# Patient Record
Sex: MALE | Race: WHITE | NOT HISPANIC OR LATINO | Employment: FULL TIME | URBAN - METROPOLITAN AREA
[De-identification: names, ages, dates, MRNs, and addresses within clinical notes are randomized per-mention and may not be internally consistent; named-entity substitution may affect disease eponyms.]

---

## 2017-01-04 ENCOUNTER — ANESTHESIA (OUTPATIENT)
Dept: SURGERY | Facility: HOSPITAL | Age: 37
DRG: 330 | End: 2017-01-04
Payer: COMMERCIAL

## 2017-01-04 PROCEDURE — 25000003 PHARM REV CODE 250: Performed by: STUDENT IN AN ORGANIZED HEALTH CARE EDUCATION/TRAINING PROGRAM

## 2017-01-04 PROCEDURE — 63600175 PHARM REV CODE 636 W HCPCS: Performed by: STUDENT IN AN ORGANIZED HEALTH CARE EDUCATION/TRAINING PROGRAM

## 2017-01-04 PROCEDURE — D9220A PRA ANESTHESIA: Mod: ,,, | Performed by: ANESTHESIOLOGY

## 2017-01-04 PROCEDURE — 25000003 PHARM REV CODE 250: Performed by: NURSE PRACTITIONER

## 2017-01-04 PROCEDURE — 63600175 PHARM REV CODE 636 W HCPCS: Performed by: NURSE PRACTITIONER

## 2017-01-04 RX ORDER — ONDANSETRON 2 MG/ML
INJECTION INTRAMUSCULAR; INTRAVENOUS
Status: DISCONTINUED | OUTPATIENT
Start: 2017-01-04 | End: 2017-01-04

## 2017-01-04 RX ORDER — GLYCOPYRROLATE 0.2 MG/ML
INJECTION INTRAMUSCULAR; INTRAVENOUS
Status: DISCONTINUED | OUTPATIENT
Start: 2017-01-04 | End: 2017-01-04

## 2017-01-04 RX ORDER — FENTANYL CITRATE 50 UG/ML
INJECTION, SOLUTION INTRAMUSCULAR; INTRAVENOUS
Status: DISCONTINUED | OUTPATIENT
Start: 2017-01-04 | End: 2017-01-04

## 2017-01-04 RX ORDER — LIDOCAINE HCL/PF 100 MG/5ML
SYRINGE (ML) INTRAVENOUS
Status: DISCONTINUED | OUTPATIENT
Start: 2017-01-04 | End: 2017-01-04

## 2017-01-04 RX ORDER — MIDAZOLAM HYDROCHLORIDE 1 MG/ML
INJECTION, SOLUTION INTRAMUSCULAR; INTRAVENOUS
Status: DISCONTINUED | OUTPATIENT
Start: 2017-01-04 | End: 2017-01-04

## 2017-01-04 RX ORDER — NEOSTIGMINE METHYLSULFATE 1 MG/ML
INJECTION, SOLUTION INTRAVENOUS
Status: DISCONTINUED | OUTPATIENT
Start: 2017-01-04 | End: 2017-01-04

## 2017-01-04 RX ORDER — KETAMINE HYDROCHLORIDE 100 MG/ML
INJECTION, SOLUTION INTRAMUSCULAR; INTRAVENOUS
Status: DISCONTINUED | OUTPATIENT
Start: 2017-01-04 | End: 2017-01-04

## 2017-01-04 RX ORDER — PROPOFOL 10 MG/ML
VIAL (ML) INTRAVENOUS
Status: DISCONTINUED | OUTPATIENT
Start: 2017-01-04 | End: 2017-01-04

## 2017-01-04 RX ORDER — PHENYLEPHRINE HYDROCHLORIDE 10 MG/ML
INJECTION INTRAVENOUS
Status: DISCONTINUED | OUTPATIENT
Start: 2017-01-04 | End: 2017-01-04

## 2017-01-04 RX ORDER — ACETAMINOPHEN 10 MG/ML
INJECTION, SOLUTION INTRAVENOUS
Status: DISCONTINUED | OUTPATIENT
Start: 2017-01-04 | End: 2017-01-04

## 2017-01-04 RX ORDER — ROCURONIUM BROMIDE 10 MG/ML
INJECTION, SOLUTION INTRAVENOUS
Status: DISCONTINUED | OUTPATIENT
Start: 2017-01-04 | End: 2017-01-04

## 2017-01-04 RX ADMIN — DEXMEDETOMIDINE HYDROCHLORIDE 0.5 MCG/KG/HR: 100 INJECTION, SOLUTION, CONCENTRATE INTRAVENOUS at 10:01

## 2017-01-04 RX ADMIN — PHENYLEPHRINE HYDROCHLORIDE 100 MCG: 10 INJECTION INTRAVENOUS at 12:01

## 2017-01-04 RX ADMIN — KETAMINE HYDROCHLORIDE 40 MG: 100 INJECTION, SOLUTION, CONCENTRATE INTRAMUSCULAR; INTRAVENOUS at 10:01

## 2017-01-04 RX ADMIN — MIDAZOLAM HYDROCHLORIDE 2 MG: 1 INJECTION, SOLUTION INTRAMUSCULAR; INTRAVENOUS at 09:01

## 2017-01-04 RX ADMIN — PHENYLEPHRINE HYDROCHLORIDE 100 MCG: 10 INJECTION INTRAVENOUS at 10:01

## 2017-01-04 RX ADMIN — PHENYLEPHRINE HYDROCHLORIDE 50 MCG: 10 INJECTION INTRAVENOUS at 12:01

## 2017-01-04 RX ADMIN — PHENYLEPHRINE HYDROCHLORIDE 50 MCG: 10 INJECTION INTRAVENOUS at 11:01

## 2017-01-04 RX ADMIN — NEOSTIGMINE METHYLSULFATE 4 MG: 1 INJECTION INTRAVENOUS at 12:01

## 2017-01-04 RX ADMIN — ONDANSETRON 4 MG: 2 INJECTION INTRAMUSCULAR; INTRAVENOUS at 12:01

## 2017-01-04 RX ADMIN — METRONIDAZOLE 500 MG: 500 SOLUTION INTRAVENOUS at 10:01

## 2017-01-04 RX ADMIN — IBUPROFEN 800 MG: 800 INJECTION INTRAVENOUS at 10:01

## 2017-01-04 RX ADMIN — ROCURONIUM BROMIDE 15 MG: 10 INJECTION, SOLUTION INTRAVENOUS at 10:01

## 2017-01-04 RX ADMIN — CEFTRIAXONE 2000 MG: 2 INJECTION, SOLUTION INTRAVENOUS at 10:01

## 2017-01-04 RX ADMIN — SODIUM CHLORIDE, SODIUM ACETATE ANHYDROUS, SODIUM GLUCONATE, POTASSIUM CHLORIDE, AND MAGNESIUM CHLORIDE: 526; 222; 502; 37; 30 INJECTION, SOLUTION INTRAVENOUS at 11:01

## 2017-01-04 RX ADMIN — SODIUM CHLORIDE: 0.9 INJECTION, SOLUTION INTRAVENOUS at 09:01

## 2017-01-04 RX ADMIN — FENTANYL CITRATE 50 MCG: 50 INJECTION, SOLUTION INTRAMUSCULAR; INTRAVENOUS at 12:01

## 2017-01-04 RX ADMIN — PROPOFOL 180 MG: 10 INJECTION, EMULSION INTRAVENOUS at 10:01

## 2017-01-04 RX ADMIN — PHENYLEPHRINE HYDROCHLORIDE 100 MCG: 10 INJECTION INTRAVENOUS at 11:01

## 2017-01-04 RX ADMIN — SODIUM CHLORIDE, SODIUM ACETATE ANHYDROUS, SODIUM GLUCONATE, POTASSIUM CHLORIDE, AND MAGNESIUM CHLORIDE: 526; 222; 502; 37; 30 INJECTION, SOLUTION INTRAVENOUS at 09:01

## 2017-01-04 RX ADMIN — ROCURONIUM BROMIDE 10 MG: 10 INJECTION, SOLUTION INTRAVENOUS at 11:01

## 2017-01-04 RX ADMIN — ACETAMINOPHEN 1000 MG: 10 INJECTION, SOLUTION INTRAVENOUS at 10:01

## 2017-01-04 RX ADMIN — FENTANYL CITRATE 50 MCG: 50 INJECTION, SOLUTION INTRAMUSCULAR; INTRAVENOUS at 10:01

## 2017-01-04 RX ADMIN — KETAMINE HYDROCHLORIDE 10 MG: 100 INJECTION, SOLUTION, CONCENTRATE INTRAMUSCULAR; INTRAVENOUS at 11:01

## 2017-01-04 RX ADMIN — FENTANYL CITRATE 50 MCG: 50 INJECTION, SOLUTION INTRAMUSCULAR; INTRAVENOUS at 11:01

## 2017-01-04 RX ADMIN — GLYCOPYRROLATE 0.6 MG: 0.2 INJECTION, SOLUTION INTRAMUSCULAR; INTRAVENOUS at 12:01

## 2017-01-04 RX ADMIN — HYDROCORTISONE SODIUM SUCCINATE 100 MG: 100 INJECTION, POWDER, FOR SOLUTION INTRAMUSCULAR; INTRAVENOUS at 10:01

## 2017-01-04 RX ADMIN — ROCURONIUM BROMIDE 35 MG: 10 INJECTION, SOLUTION INTRAVENOUS at 10:01

## 2017-01-04 RX ADMIN — LIDOCAINE HYDROCHLORIDE 100 MG: 20 INJECTION, SOLUTION INTRAVENOUS at 10:01

## 2017-01-10 ENCOUNTER — TELEPHONE (OUTPATIENT)
Dept: SURGERY | Facility: CLINIC | Age: 37
End: 2017-01-10

## 2017-01-10 NOTE — TELEPHONE ENCOUNTER
----- Message from Mack Solis sent at 1/10/2017  4:21 PM CST -----  Contact: Pt wife:[phone]  Pt wife called and states he would like to speak with  's nurse in regards to the pt. Pt wife states the pt is experiencing a lot of pain after the surgery and some nausea. Pt wife also states she has some question in regards to the pt traveling.

## 2017-01-11 ENCOUNTER — PATIENT OUTREACH (OUTPATIENT)
Dept: ADMINISTRATIVE | Facility: CLINIC | Age: 37
End: 2017-01-11
Payer: COMMERCIAL

## 2017-01-11 NOTE — PATIENT INSTRUCTIONS
Discharge Instructions: Caring for Your Incision  You are going home with stitches (sutures), surgical staples, special strips of surgical tape called Steri-Strips, or surgical skin glue. One of these items was used to close your incision, help stop bleeding, and speed healing. Follow the tips on this sheet to help your incision heal.  Home care  · Always wash your hands before touching your incision.  · Keep your incision clean and dry.  · Avoid doing things that could cause dirt or sweat to get on your incision.  · Dont pick at scabs. They help protect the wound.  · Keep your incision out of water.  · Take a sponge bath to avoid getting your incision wet, unless your healthcare provider tells you otherwise.  · Ask your provider when can you take a shower or bathe.  · Ask your provider about the best way to keep your incision dry when bathing or showering.  · Pat sutures dry if they get wet. Dont rub.  · Leave the dressing (bandage) in place until you are told to remove it or change it. Change it only as directed, using clean hands.  · After the first 12 hours, change your dressing every 24 hours, or as directed by your healthcare provider.  · Change your dressing if it gets wet or soiled.  Care for specific closures  Follow these guidelines unless your child's healthcare provider tells you otherwise:  · Sutures or staples. Once you no longer need to keep these dry, clean the incision or wound daily. First remove the bandage using clean hands. Then wash the area gently with soap and warm water. Use a wet cotton swab to loosen and remove any blood or crust that forms. After cleaning, put a thin layer of antibiotic ointment on. Then put on a new bandage.  · Skin glue. Dont put liquid, ointment, or cream on your incision or wound while the glue is in place. Avoid activities that cause heavy sweating. Protect the incision or wound from sunlight. Do not scratch, rub, or pick at the glue. Do not put tape directly  over the glue. The glue should peel off within 5 to 10 days.  · Surgical tape. Keep your incision or wound dry. If it gets wet, blot the area dry with a clean towel. Surgical tape usually falls off within 7 to 10 days. If it has not fallen off after 10 days, contact your healthcare provider before taking it off yourself. If you are told to remove the tape, put mineral oil or petroleum jelly on a cotton ball. Gently rub the tape until it is removed.  Follow-up care  Follow up with your healthcare provider to ask how long sutures or staples should be left in place. Be sure to return for suture or staple removal as directed. If dissolving stitches were used in your mouth, these will not need to be removed. They should fall out or dissolve on their own.  If tape closures were used, remove them yourself when your provider recommends if they have not fallen off on their own. If skin glue was used, the glue will wear off by itself.      When to seek medical care  Call your healthcare provider right away if you have any of the following:  · More pain, redness, swelling, bleeding, or foul-smelling discharge around the incision area  · Fever of 100.4°F (38°C) or higher, or as directed by your healthcare provider  · Shaking chills  · Vomiting or nausea that doesnt go away  · Numbness, coldness, or tingling around the incision area, or changes in skin color  · Opening of the sutures or wound  · Stitches or staples come apart or fall out or surgical tape falls off before 7 days, or as directed by your provider   © 7644-6397 [employer]. [address] [place] [address], [place] [address]. All rights reserved. This information is not intended as a substitute for professional medical care. Always follow your healthcare professional's instructions.

## 2017-01-23 ENCOUNTER — OFFICE VISIT (OUTPATIENT)
Dept: SURGERY | Facility: CLINIC | Age: 37
End: 2017-01-23
Payer: COMMERCIAL

## 2017-01-23 VITALS
SYSTOLIC BLOOD PRESSURE: 105 MMHG | HEART RATE: 111 BPM | HEIGHT: 72 IN | WEIGHT: 154.31 LBS | BODY MASS INDEX: 20.9 KG/M2 | DIASTOLIC BLOOD PRESSURE: 78 MMHG

## 2017-01-23 DIAGNOSIS — K50.012 CROHN'S DISEASE OF SMALL INTESTINE WITH INTESTINAL OBSTRUCTION: Primary | ICD-10-CM

## 2017-01-23 PROCEDURE — 99024 POSTOP FOLLOW-UP VISIT: CPT | Mod: S$GLB,,, | Performed by: COLON & RECTAL SURGERY

## 2017-01-23 PROCEDURE — 99999 PR PBB SHADOW E&M-EST. PATIENT-LVL III: CPT | Mod: PBBFAC,,, | Performed by: COLON & RECTAL SURGERY

## 2017-01-23 NOTE — PROGRESS NOTES
History & Physical    SUBJECTIVE:     History of Present Illness:  Mr. Toro is a 36 y.o. male with a history of Crohn's disease who is here for a postop follow up. He is status post laparoscopic ileocecectomy on 1/4/17. Today he is doing very well. His postoperative course has been uncomplicated. His pain has continued to improve, but he is currently still requiring about 4 pain pills per day. He says that his bowel movements have remained soft and regular on a low residue diet. He denied fever, chills, nausea, vomiting, constipation, shortness of breath, chest pain, and incisional redness, swelling, or drainage.      Chief Complaint   Patient presents with    Crohn's Disease       Review of patient's allergies indicates:   Allergen Reactions    Remicade [infliximab]        Current Outpatient Prescriptions   Medication Sig Dispense Refill    oxycodone-acetaminophen (PERCOCET) 5-325 mg per tablet Take 1-2 tablets by mouth every 4 (four) hours as needed. 91 tablet 0    adalimumab (HUMIRA PEN) 40 mg/0.8 mL PnKt Inject 0.8 mLs (40 mg total) into the skin every 14 (fourteen) days. Every 2 weeks 6 each 4    fluocinonide 0.05% (LIDEX) 0.05 % cream Apply sparingly bid to affected areas 30 g 0    silver sulfADIAZINE 1% (SILVADENE) 1 % cream Apply topically 2 (two) times daily. Apply sparingly bid to affected areas 25 g 1     No current facility-administered medications for this visit.        Past Medical History   Diagnosis Date    Allergy      seasonal    Chronic diarrhea     Crohn disease     DDD (degenerative disc disease), cervical     Joint pain     Keloid cicatrix     Ulcer     Ulcerative colitis      Past Surgical History   Procedure Laterality Date    Abcess drainage      Colonoscopy N/A 12/5/2016     Procedure: COLONOSCOPY;  Surgeon: Steven Kerr MD;  Location: [address]);  Service: Endoscopy;  Laterality: N/A;     Family History   Problem Relation Age of Onset    Arthritis Father      Cancer Maternal Grandmother     Melanoma Neg Hx     Psoriasis Neg Hx     Lupus Neg Hx     Eczema Neg Hx     Acne Neg Hx     Colon cancer Neg Hx     Rectal cancer Neg Hx     Stomach cancer Neg Hx     Crohn's disease Neg Hx     Esophageal cancer Neg Hx     Irritable bowel syndrome Neg Hx     Ulcerative colitis Neg Hx     Celiac disease Neg Hx      Social History   Substance Use Topics    Smoking status: Never Smoker    Smokeless tobacco: Never Used    Alcohol use No        Review of Systems:  Negative, except as outlined in the HPI.     OBJECTIVE:     Vital Signs (Most Recent)  Pulse: (!) 111 (01/23/17 1317)  BP: 105/78 (01/23/17 1317)  6' (1.829 m)  6' (1.829 m)     Physical Exam:  General: well developed, well nourished, no distress  Head: normocephalic, atraumatic  Eyes:  conjunctivae/corneas clear.  Neck: supple, symmetrical, trachea midline  Lungs:  normal respiratory effort  Heart: regular rate and rhythm  Abdomen: soft, non-tender non-distented; incisions are well-healed with no sign of acute infection.   Neurologic: Alert and oriented. Thought content appropriate      ASSESSMENT/PLAN:     Mr. Toro is a 37 yo M with Crohn's disease; now s/p laparoscopic ileocecectomy on 1/4, doing well.     - Resume Humira   - Needs to follow up with GI  - May go to a regular diet  - Resume normal daily activities  - May return to light duty work at 6 weeks, unrestricted at 8 weeks  - Return to clinic PRN     I have interviewed and examined the patient, reviewed the notes and assessments, and/or personally supervised the procedure(s) performed by Dr. Sauer, and I concur with her/his documentation of Escobar Toro.  See below addendum for my evaluation and additional findings.    37 yo M with ileocecal Crohn's & fibrostenotic terminal ileal stricture, s/p laparoscopic ileocecectomy 1/4/17.    Doing well post-op, still with some incisional pain.  Eating with no N/V, BM's soft, no F/C.    Exam as  noted above.    Pathology:  Ileocecal colon, segmental resection:  Ileum and cecum show a grossly identified area of stricture mucosa showing scattered lymphoid aggregates.  No evidence of dysplasia or malignancy identified.  Ileocecal valve shows submucosal mature adipose tissue forming a polypoid lesion consistent with fatty replacement at the ileocecal valve. No evidence of dysplasia or malignancy is identified.  Proximal ileal margin is viable and free of dysplasia or malignancy.  Distal colonic margin is viable and free of dysplasia or malignancy.  Total length of small bowel resected - 10 cm    IMPRESSION:  Crohn's disease, s/p lap ileocecectomy    RECOMMENDATIONS:  Diet as tolerated - increase fiber intake  OK to RTW light duty 6 wks post-op, full duty 8 weeks  F/U with GI to resume anti-TNF therapy  RTO prn

## 2017-01-26 ENCOUNTER — TELEPHONE (OUTPATIENT)
Dept: GASTROENTEROLOGY | Facility: CLINIC | Age: 37
End: 2017-01-26

## 2017-01-26 NOTE — TELEPHONE ENCOUNTER
----- Message from Steven Kerr MD sent at 1/25/2017  3:41 PM CST -----  Follow up in GI clinic in a month.

## 2017-02-23 ENCOUNTER — TELEPHONE (OUTPATIENT)
Dept: GASTROENTEROLOGY | Facility: CLINIC | Age: 37
End: 2017-02-23

## 2017-02-23 ENCOUNTER — OFFICE VISIT (OUTPATIENT)
Dept: GASTROENTEROLOGY | Facility: CLINIC | Age: 37
End: 2017-02-23
Payer: COMMERCIAL

## 2017-02-23 VITALS
BODY MASS INDEX: 22.19 KG/M2 | HEIGHT: 72 IN | SYSTOLIC BLOOD PRESSURE: 124 MMHG | DIASTOLIC BLOOD PRESSURE: 86 MMHG | WEIGHT: 163.81 LBS | HEART RATE: 113 BPM

## 2017-02-23 DIAGNOSIS — K50.10 CROHN'S COLITIS, WITHOUT COMPLICATIONS: ICD-10-CM

## 2017-02-23 DIAGNOSIS — K50.00 CROHN'S DISEASE OF SMALL INTESTINE WITHOUT COMPLICATION: Primary | ICD-10-CM

## 2017-02-23 PROCEDURE — 99214 OFFICE O/P EST MOD 30 MIN: CPT | Mod: S$GLB,,, | Performed by: INTERNAL MEDICINE

## 2017-02-23 PROCEDURE — 1160F RVW MEDS BY RX/DR IN RCRD: CPT | Mod: S$GLB,,, | Performed by: INTERNAL MEDICINE

## 2017-02-23 PROCEDURE — 99999 PR PBB SHADOW E&M-EST. PATIENT-LVL III: CPT | Mod: PBBFAC,,, | Performed by: INTERNAL MEDICINE

## 2017-02-23 RX ORDER — POLYETHYLENE GLYCOL 3350, SODIUM SULFATE ANHYDROUS, SODIUM BICARBONATE, SODIUM CHLORIDE, POTASSIUM CHLORIDE 236; 22.74; 6.74; 5.86; 2.97 G/4L; G/4L; G/4L; G/4L; G/4L
POWDER, FOR SOLUTION ORAL
Refills: 0 | COMMUNITY
Start: 2016-12-03 | End: 2017-06-19

## 2017-02-23 NOTE — PROGRESS NOTES
REASON FOR VISIT:  Crohn disease of small bowel.    HISTORY OF PRESENT ILLNESS:  Mr. Toro was last seen in October in clinic   after recent hospitalization with fibrostenotic TI with possible mild   inflammation, which responded to prednisone.  He was previously on Humira;   however, given the continued stricture and his clinical symptoms, he was   recommended to undergo ileocolectomy and on 01/04/2017 underwent ileocecal   resection of 10 cm of strictured small bowel.  He gradually recovered and is   currently feeling significantly better, although he feels a little pain in his   joints of his hands, shoulder, back and hips.  Bowels are moving without much   issue, no blood.  His wounds are healing well.  Today, we talked about resuming   the Humira given the high chance of postoperative recurrence.  We will initiate   prior authorization with his insurance company if needed.  Given that he has   been off his Humira for at least two months now, we will initiate with the   loading dose and continue the maintenance.    PAST MEDICAL, SURGICAL, SOCIAL AND FAMILY HISTORY:  Reviewed.    MEDICATIONS AND ALLERGIES:  Reviewed.    REVIEW OF SYSTEMS:  CONSTITUTIONAL:  No fever, no chills.  Body weight is stable.  EYES:  No visual changes.  No red eyes.  ENT:  No odynophagia or hoarseness.  CARDIOVASCULAR:  No angina or palpitations.  RESPIRATORY:  No shortness of breath or wheezing.  GASTROINTESTINAL:  No blood in the stool.  MUSCULOSKELETAL:  Complains of diffuse arthralgias.    PHYSICAL EXAMINATION:  VITAL SIGNS:  See EPIC.  GENERAL:  Awake, alert and oriented x3, in no acute distress.  ABDOMEN:  Flat, soft, mild tenderness around the incisions, but otherwise bowel   sounds are active.  CARDIOVASCULAR:  S1, S2 normal.  RESPIRATORY:  Bilateral air entry equal.    IMPRESSION:  Crohn disease with history of fibrostenotic TI, status post   ileocecal resection, currently in clinical remission.    RECOMMENDATIONS:  1.  We  will reinitiate Humira with loading dose:  2.  Follow up in GI Clinic in six months or sooner with any issues.      ACT/HN  dd: 02/23/2017 13:29:58 (CST)  td: 02/24/2017 02:40:52 (CST)  Doc ID   #[id number]  Job ID #[id number]    CC:

## 2017-02-23 NOTE — MR AVS SNAPSHOT
[facility] - Gastroenter[facility]  [address]  [facility] [address]  Phone: [phone]  Fax: [phone]                  Escobar Toro   2017 1:00 PM   Office Visit    Description:  Male : 1980   Provider:  Steven Kerr MD   Department:  [facility]           Reason for Visit     Follow-up                To Do List           Goals (5 Years of Data)     None      [facility]     [facility] Nurse Care Line -  Assistance  Registered nurses in the [facility] provide clinical advisement, health education, appointment booking, and other advisory services.  Call for this free service at [phone].             Medications           Message regarding Medications     Verify the changes and/or additions to your medication regime listed below are the same as discussed with your clinician today.  If any of these changes or additions are incorrect, please notify your healthcare provider.             Verify that the below list of medications is an accurate representation of the medications you are currently taking.  If none reported, the list may be blank. If incorrect, please contact your healthcare provider. Carry this list with you in case of emergency.           Current Medications     adalimumab (HUMIRA PEN) 40 mg/0.8 mL PnKt Inject 0.8 mLs (40 mg total) into the skin every 14 (fourteen) days. Every 2 weeks    fluocinonide 0.05% (LIDEX) 0.05 % cream Apply sparingly bid to affected areas    oxycodone-acetaminophen (PERCOCET) 5-325 mg per tablet Take 1-2 tablets by mouth every 4 (four) hours as needed.    polyethylene glycol (GOLYTELY,NULYTELY) 236-22.74-6.74 -5.86 gram suspension TAKE 4,000 MLS (4 L TOTAL) BY MOUTH ONCE.    silver sulfADIAZINE 1% (SILVADENE) 1 % cream Apply topically 2 (two) times daily. Apply sparingly bid to affected areas           Clinical Reference Information           Your Vitals Were     BP Pulse Height Weight BMI     124/86 113 6' (1.829 m) 74.3 kg (163 lb 12.8 oz) 22.22 kg/m2      Blood Pressure          Most Recent Value    BP  124/86      Allergies as of 2/23/2017     Remicade [Infliximab]      Immunizations Administered on Date of Encounter - 2/23/2017     None      Language Assistance Services     ATTENTION: Language assistance services are available, free of charge. Please call [phone].      ATENCIÓN: Si habla español, tiene a wahl disposición servicios gratuitos de asistencia lingüística. Llame al [phone].     CHÚ Ý: N?u b?n nói Ti?ng Vi?t, có các d?ch v? h? tr? ngôn ng? mi?n phí dành cho b?n. G?i s? [phone].         [facility] complies with applicable Federal civil rights laws and does not discriminate on the basis of race, color, national origin, age, disability, or sex.

## 2017-02-23 NOTE — TELEPHONE ENCOUNTER
Ma completed the PA for patient medication and it was faxed to [facility] on 2/23 fax number [phone]

## 2017-04-03 ENCOUNTER — TELEPHONE (OUTPATIENT)
Dept: INTERNAL MEDICINE | Facility: CLINIC | Age: 37
End: 2017-04-03

## 2017-04-03 NOTE — TELEPHONE ENCOUNTER
----- Message from Anne-Marie Way sent at 4/3/2017 11:33 AM CDT -----  Contact: [employer]  Message     Appointment Request From: Escobar Toro        With Provider: Liana Selby MD [-Primary Care Physician-]        Would Accept With:Request appointment time not available        Preferred Date Range: Any date 4/1/2017 or later        Preferred Times: Any        Reason for visit: Request an Appt        Comments:    I went to an urgent care walk in clinic off of [address] for a cough that has lasted for about two weeks now. The doctor said I have asthma, I have never had asthma in my life. He prescribed me asthma inhalers. Unfortunately, I question his judgement and would like a more sound opinion from a reliable doctor and staff.

## 2017-04-03 NOTE — TELEPHONE ENCOUNTER
Wheezing can occur with any kind of bronchitis, and may completely resolve when it's over.   May schedule urgent care if he has lingering fever, chest tightness or shortness of breath, otherwise - call back if still coughing after a month.  If he requests treatment of some kind, I'll need to know what he already received.

## 2017-04-04 NOTE — TELEPHONE ENCOUNTER
Spoke with patient he states he ll try the inhaler that the urgent care doctor prescribe and will follow up with us if need be..  Thanks  Laureen

## 2017-05-24 ENCOUNTER — OFFICE VISIT (OUTPATIENT)
Dept: INTERNAL MEDICINE | Facility: CLINIC | Age: 37
End: 2017-05-24
Payer: COMMERCIAL

## 2017-05-24 ENCOUNTER — HOSPITAL ENCOUNTER (EMERGENCY)
Facility: HOSPITAL | Age: 37
Discharge: HOME | End: 2017-05-24
Attending: EMERGENCY MEDICINE
Payer: COMMERCIAL

## 2017-05-24 ENCOUNTER — HOSPITAL ENCOUNTER (OUTPATIENT)
Dept: RADIOLOGY | Facility: HOSPITAL | Age: 37
Discharge: HOME | End: 2017-05-24
Attending: INTERNAL MEDICINE
Payer: COMMERCIAL

## 2017-05-24 VITALS
TEMPERATURE: 100 F | OXYGEN SATURATION: 98 % | HEIGHT: 72 IN | BODY MASS INDEX: 21.13 KG/M2 | DIASTOLIC BLOOD PRESSURE: 87 MMHG | HEART RATE: 122 BPM | SYSTOLIC BLOOD PRESSURE: 133 MMHG | WEIGHT: 156 LBS | RESPIRATION RATE: 18 BRPM

## 2017-05-24 VITALS
BODY MASS INDEX: 21.41 KG/M2 | TEMPERATURE: 99 F | DIASTOLIC BLOOD PRESSURE: 90 MMHG | WEIGHT: 158.06 LBS | HEIGHT: 72 IN | OXYGEN SATURATION: 98 % | SYSTOLIC BLOOD PRESSURE: 128 MMHG | HEART RATE: 134 BPM

## 2017-05-24 DIAGNOSIS — J18.9 PNEUMONIA OF RIGHT UPPER LOBE DUE TO INFECTIOUS ORGANISM: ICD-10-CM

## 2017-05-24 DIAGNOSIS — J02.0 STREP PHARYNGITIS: ICD-10-CM

## 2017-05-24 DIAGNOSIS — J02.0 STREP THROAT: Primary | ICD-10-CM

## 2017-05-24 DIAGNOSIS — J18.9 PNEUMONIA OF RIGHT UPPER LOBE DUE TO INFECTIOUS ORGANISM: Primary | ICD-10-CM

## 2017-05-24 DIAGNOSIS — R68.89 FLU-LIKE SYMPTOMS: ICD-10-CM

## 2017-05-24 LAB
DEPRECATED S PYO AG THROAT QL EIA: POSITIVE
FLUAV AG SPEC QL IA: NEGATIVE
FLUBV AG SPEC QL IA: NEGATIVE
SPECIMEN SOURCE: NORMAL

## 2017-05-24 PROCEDURE — 99999 PR PBB SHADOW E&M-EST. PATIENT-LVL V: CPT | Mod: PBBFAC,,, | Performed by: PHYSICIAN ASSISTANT

## 2017-05-24 PROCEDURE — 87400 INFLUENZA A/B EACH AG IA: CPT | Mod: 59

## 2017-05-24 PROCEDURE — 63600175 PHARM REV CODE 636 W HCPCS: Performed by: STUDENT IN AN ORGANIZED HEALTH CARE EDUCATION/TRAINING PROGRAM

## 2017-05-24 PROCEDURE — 96372 THER/PROPH/DIAG INJ SC/IM: CPT

## 2017-05-24 PROCEDURE — 99283 EMERGENCY DEPT VISIT LOW MDM: CPT | Mod: ,,, | Performed by: EMERGENCY MEDICINE

## 2017-05-24 PROCEDURE — 87880 STREP A ASSAY W/OPTIC: CPT

## 2017-05-24 PROCEDURE — 71020 XR CHEST PA AND LATERAL: CPT | Mod: 26,,, | Performed by: RADIOLOGY

## 2017-05-24 PROCEDURE — 71020 XR CHEST PA AND LATERAL: CPT | Mod: TC

## 2017-05-24 PROCEDURE — 99214 OFFICE O/P EST MOD 30 MIN: CPT | Mod: S$GLB,,, | Performed by: PHYSICIAN ASSISTANT

## 2017-05-24 PROCEDURE — 99283 EMERGENCY DEPT VISIT LOW MDM: CPT | Mod: 25

## 2017-05-24 RX ORDER — AZITHROMYCIN 500 MG/1
500 TABLET, FILM COATED ORAL DAILY
Qty: 5 TABLET | Refills: 0 | Status: SHIPPED | OUTPATIENT
Start: 2017-05-24 | End: 2017-06-19

## 2017-05-24 RX ORDER — DEXAMETHASONE SODIUM PHOSPHATE 4 MG/ML
10 INJECTION, SOLUTION INTRA-ARTICULAR; INTRALESIONAL; INTRAMUSCULAR; INTRAVENOUS; SOFT TISSUE
Status: COMPLETED | OUTPATIENT
Start: 2017-05-24 | End: 2017-05-24

## 2017-05-24 RX ADMIN — DEXAMETHASONE SODIUM PHOSPHATE 10 MG: 4 INJECTION, SOLUTION INTRAMUSCULAR; INTRAVENOUS at 11:05

## 2017-05-24 NOTE — ED NOTES
Pt identifiers Escobar [facility] were checked and correct  LOC: The patient is awake, alert, aware of environment with an appropriate affect. Oriented x3, speaking appropriately  APPEARANCE: Pt resting comfortably, in no acute distress, pt is clean and well groomed, clothing properly fastened  SKIN: Skin warm, dry and intact, normal skin turgor, moist mucus membranes  RESPIRATORY: Airway is open and patent, respirations are spontaneous, even and unlabored, normal effort and rate  CARDIAC: Normal rate and rhythm, no peripheral edema noted, capillary refill < 3 seconds, bilateral radial pulses 2+  ABDOMEN: Soft, non tender, non distended. Bowel sounds present x 4 quadrants.    NEUROLOGIC: PERRLA, facial expression is symmetrical, patient moving all extremities spontaneously, normal sensation in all extremities when touched with a finger.  Follows all commands appropriately  MUSCULOSKELETAL: No obvious deformities. Pt c/o body aches.

## 2017-05-24 NOTE — ED NOTES
Pt reports fever, sore throat, body aches started Saturday. Went to PCP today. PCP ordered CXR - concerning for RUL PNA.  No cough. Pt has crohn's dx and has not been taking humira because insurance didn't cover it. Not on any steroids right now as well.

## 2017-05-24 NOTE — PROGRESS NOTES
Subjective:       Patient ID: Escobar Toro is a 37 y.o. male.        Chief Complaint: Fever; Headache (pain=5); Generalized Body Aches (pain=8); and Cough (yellow thick mucus)    Escobar Toro is an established patient of Liana Selby MD here today for urgent care visit.    Sore throat, wheezing, cough productive of yellow mucus, nasal congestion, headaches, body aches, fever (subjective), chills/sweats.  Sx started 5 days ago but sx worsened 2 days ago.  No chest pain or shortness of breath.  Did not get a flu shot.      With Crohn's but not currently on any medications due to insurance issues.  Sees Dr. Kerr.  No abdominal pain, diarrhea, nausea, vomiting, blood in the stool.             Review of Systems   Constitutional: Positive for chills, diaphoresis and fever. Negative for appetite change and fatigue.   HENT: Positive for congestion. Negative for sore throat.    Eyes: Negative for visual disturbance.   Respiratory: Positive for cough and wheezing. Negative for chest tightness and shortness of breath.    Cardiovascular: Negative for chest pain, palpitations and leg swelling.   Gastrointestinal: Negative for abdominal pain, blood in stool, constipation, diarrhea, nausea and vomiting.   Genitourinary: Negative for dysuria, frequency, hematuria and urgency.   Musculoskeletal: Positive for myalgias. Negative for arthralgias and back pain.   Skin: Negative for rash.   Neurological: Positive for headaches. Negative for dizziness, syncope and weakness.   Psychiatric/Behavioral: Negative for dysphoric mood and sleep disturbance. The patient is not nervous/anxious.        Objective:      Physical Exam   Constitutional: He appears well-developed and well-nourished. He appears ill (lying on exam table). No distress.   HENT:   Head: Normocephalic and atraumatic.   Right Ear: Tympanic membrane, external ear and ear canal normal.   Left Ear: Tympanic membrane, external ear and ear canal normal.    Nose: Nose normal.   Mouth/Throat: Oropharyngeal exudate, posterior oropharyngeal edema and posterior oropharyngeal erythema present. No tonsillar abscesses.   Eyes: Conjunctivae are normal. Pupils are equal, round, and reactive to light.   Cardiovascular: Regular rhythm and normal heart sounds.  Tachycardia present.  Exam reveals no gallop and no friction rub.    No murmur heard.  Pulmonary/Chest: Effort normal. No respiratory distress. He has rales in the right upper field.   Abdominal: Soft. There is no tenderness. There is no rebound and no guarding.   Musculoskeletal: He exhibits no edema.   Lymphadenopathy:     He has cervical adenopathy.        Right cervical: Superficial cervical adenopathy present.        Left cervical: Superficial cervical adenopathy present.   Neurological: He is alert.   Skin: Skin is warm and dry. He is not diaphoretic.   Psychiatric: He has a normal mood and affect.   Nursing note and vitals reviewed.      Assessment:       1. Pneumonia of right upper lobe due to infectious organism    2. Strep pharyngitis        Plan:       Escobar was seen today for fever, headache, generalized body aches and cough.    Diagnoses and all orders for this visit:    Pneumonia of right upper lobe due to infectious organism  -     Influenza antigen Nasopharyngeal Swab  -     X-Ray Chest PA And Lateral; Future  -     CBC auto differential; Future  -     Comprehensive metabolic panel; Future  -     Refer to Emergency Dept.    Strep pharyngitis  -     Throat Screen, Rapid  -     CBC auto differential; Future  -     Comprehensive metabolic panel; Future    CXR showing RUL pneumonia.  WBC count elevated at 14.8.  Given tachycardia, RUL pneumonia, +strep, elevated WBC count, and immunocompromised status with Crohn's disease, needs to go to ED with consideration for IV fluids, IV antibiotics.      Pt has been given instructions populated from [employer] database and has verbalized understanding of the after visit  "summary and information contained wherein.    Follow up with a primary care provider. May go to ER for acute shortness of breath, lightheadedness, fever, or any other emergent complaints or changes in condition.    "This note will be shared with the patient"    No future appointments.            "

## 2017-05-24 NOTE — ED PROVIDER NOTES
Encounter Date: 5/24/2017       History     Chief Complaint   Patient presents with    Pneumonia     Review of patient's allergies indicates:   Allergen Reactions    Remicade [infliximab]      HPI   Pt reports fever, sore throat, body aches started Saturday.  Went to PCP today.  PCP ordered CXR - concerning for RUL PNA.  No cough.    Pt has crohn's dz and has not been taking humira because insure didn't cover it.    Not on any steroids right now either.    Past Medical History:   Diagnosis Date    Allergy     seasonal    Chronic diarrhea     Crohn disease     DDD (degenerative disc disease), cervical     Joint pain     Keloid cicatrix     Ulcer     Ulcerative colitis      Past Surgical History:   Procedure Laterality Date    ABCESS DRAINAGE      COLONOSCOPY N/A 12/5/2016    Procedure: COLONOSCOPY;  Surgeon: Steven Kerr MD;  Location: [facility] ([address] [place] [address]);  Service: Endoscopy;  Laterality: N/A;     Family History   Problem Relation Age of Onset    Arthritis Father     Cancer Maternal Grandmother     Melanoma Neg Hx     Psoriasis Neg Hx     Lupus Neg Hx     Eczema Neg Hx     Acne Neg Hx     Colon cancer Neg Hx     Rectal cancer Neg Hx     Stomach cancer Neg Hx     Crohn's disease Neg Hx     Esophageal cancer Neg Hx     Irritable bowel syndrome Neg Hx     Ulcerative colitis Neg Hx     Celiac disease Neg Hx      Social History   Substance Use Topics    Smoking status: Never Smoker    Smokeless tobacco: Never Used    Alcohol use No     Review of Systems   Constitutional: Positive for chills and fever.   HENT: Positive for sore throat.    All other systems reviewed and are negative.      Physical Exam     Initial Vitals [05/24/17 0950]   BP Pulse Resp Temp SpO2   133/87 (!) 122 18 99.9 °F (37.7 °C) 98 %     Physical Exam  Gen/Constitutional: Interactive. No acute distress  Head: Normocephalic, Atraumatic  Neck: supple, no masses or LAD  Eyes: PERRLA, conjunctiva clear  Ears, Nose and  Throat: No rhinorrhea or stridor., bilateral tonsillar edema, 3+ with yellowish exudates.  tonsills symetric, uvula midline.  Cardiac: Tachy, regular, No murmur  Pulmonary: CTA Bilat, no wheezes, rhonchi, rales.  GI: Abdomen soft, non-tender, non-distended; no rebound or guarding  : No CVA tenderness.  Musculoskeletal: Extremities warm, well perfused, no erythema, no edema  Skin: No rashes  Neuro: Alert and Oriented x 3; No focal motor or sensory deficits.    Psych: Normal affect    ED Course   Procedures  Labs Reviewed - No data to display   Labs from clinic reviewed  Strep positive  Influenza negative.    CXR from clinic reviewed and noteable for small RUL opacification - concerning for PNA.                       Attending Attestation:   Physician Attestation Statement for Resident:  As the supervising MD   Physician Attestation Statement: I have personally seen and examined this patient.   I agree with the above history. -:   As the supervising MD I agree with the above PE.    As the supervising MD I agree with the above treatment, course, plan, and disposition.   -: Patient presents with fever, chills, body aches, sore throat.  Referred to the ER from clinic because of tachycardia and concern for pneumonia.  Clinically the patient does not report symptoms of cough but there is an infiltrate in the right upper lobe that is small on his chest x-ray from today.  His strep swab is positive.  I also considered peritonsillar abscess but felt this was unlikely given exam.  I also considered retropharyngeal and parapharyngeal abscess but felt these were unlikely based on history and exam.  The patient's blood pressure is within normal limits.  He is tachycardic which I felt was due to fever and strep pharyngitis and small pneumonia.  He is not taking immune modulating agents at this time for his Crohn's disease.  He is young and otherwise healthy.  I felt he would be stable for outpatient management with treatment  with IM Decadron for pharyngitis and oral azithromycin for coverage of both strep pharyngitis and pneumonia.  He was instructed to follow-up with his primary care doctor or return to the ER if any worsening symptoms.  The patient was given strict return precautions and follow up instructions and expressed understanding of these.  The patient felt comfortable with the plan for discharge and I did as well.  Stable for DC.      I have reviewed and agree with the residents interpretation of the following: lab data and x-rays.  I have reviewed the following: old records at this facility.                    ED Course     Clinical Impression:   The primary encounter diagnosis was Strep throat. A diagnosis of Pneumonia of right upper lobe due to infectious organism was also pertinent to this visit.          Rubén Acosta MD  05/24/17 1152

## 2017-06-19 ENCOUNTER — TELEPHONE (OUTPATIENT)
Dept: GASTROENTEROLOGY | Facility: CLINIC | Age: 37
End: 2017-06-19

## 2017-06-19 ENCOUNTER — HOSPITAL ENCOUNTER (OUTPATIENT)
Dept: RADIOLOGY | Facility: HOSPITAL | Age: 37
Discharge: HOME | End: 2017-06-19
Attending: FAMILY MEDICINE
Payer: COMMERCIAL

## 2017-06-19 ENCOUNTER — OFFICE VISIT (OUTPATIENT)
Dept: INTERNAL MEDICINE | Facility: CLINIC | Age: 37
End: 2017-06-19
Payer: COMMERCIAL

## 2017-06-19 ENCOUNTER — NURSE TRIAGE (OUTPATIENT)
Dept: ADMINISTRATIVE | Facility: CLINIC | Age: 37
End: 2017-06-19

## 2017-06-19 ENCOUNTER — TELEPHONE (OUTPATIENT)
Dept: INTERNAL MEDICINE | Facility: CLINIC | Age: 37
End: 2017-06-19

## 2017-06-19 VITALS
WEIGHT: 163.5 LBS | BODY MASS INDEX: 22.14 KG/M2 | OXYGEN SATURATION: 97 % | HEART RATE: 112 BPM | TEMPERATURE: 100 F | DIASTOLIC BLOOD PRESSURE: 78 MMHG | SYSTOLIC BLOOD PRESSURE: 130 MMHG | HEIGHT: 72 IN

## 2017-06-19 DIAGNOSIS — K50.10 CROHN'S COLITIS, WITHOUT COMPLICATIONS: ICD-10-CM

## 2017-06-19 DIAGNOSIS — R06.2 WHEEZES: ICD-10-CM
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
## 2017-06-19 PROCEDURE — 71020 XR CHEST PA AND LATERAL: CPT | Mod: TC

## 2017-06-19 PROCEDURE — 71020 XR CHEST PA AND LATERAL: CPT | Mod: 26,,, | Performed by: RADIOLOGY

## 2017-06-19 PROCEDURE — 99213 OFFICE O/P EST LOW 20 MIN: CPT | Mod: S$GLB,,, | Performed by: FAMILY MEDICINE

## 2017-06-19 PROCEDURE — 99999 PR PBB SHADOW E&M-EST. PATIENT-LVL IV: CPT | Mod: PBBFAC,,, | Performed by: FAMILY MEDICINE

## 2017-06-19 RX ORDER — FLUOCINONIDE 0.5 MG/G
CREAM TOPICAL
Qty: 30 G | Refills: 0 | Status: CANCELLED | OUTPATIENT
Start: 2017-06-19

## 2017-06-19 NOTE — PROGRESS NOTES
"S/  [facility] visit, PCP is Dr. Barrera; GI doc for Chron's is Dr. Kerr  He noticed an insect or spider bite on his leg in the past week, he has been cleaning it with alcohol and triple antibiotic ointment,a dn the red area around it has been going down, and it has been improving. He told his family he would come in to see the doctor if it was not gone by today, but he is not worried about it because it seems to be going away on its own. However, he notes that anything that activates his immune system sometimes triggers a Chron's flair    Hx Chron's with a major operation to remove part of his colon in Jan.  He feels that he might be getting a Chron's flair soon because he feels a low grade fever and some anal discomfort which he often does before such flairs. He was on humira from GI but the Rx ran out.    Last month he went to UC, was told he might have some asthma, then came here and was told he had pneumonia and was given zpak. On 5/24/17, CXR: "Chest x-ray showing right upper lobe pneumonia"    O/  /78 (BP Location: Left arm, Patient Position: Sitting, BP Method: Manual)   Pulse (!) 112   Temp 99.9 °F (37.7 °C)   Ht 6' (1.829 m)   Wt 74.2 kg (163 lb 7.5 oz)   SpO2 97%   BMI 22.17 kg/m²     His right lower leg with about an inch leobardo of redness, not war4m or swelling around a central bite joyce appears to be a local reaction to toxin form the bite, not a cellulitis.     Cor s1s2  Lungs: RUL wheezes consistently, and occ scattered wheezes elsewhere (R&L, lower and upper) that quickly clear.   Abd shelton Cody was seen today for insect bite and irritable bowel syndrome.    Diagnoses and all orders for this visit:    Insect bite, initial encounter    Crohn's colitis, without complications  -     Ambulatory consult to Gastroenterology  - will copy his GI doc on this note    Wheezes  -     X-Ray Chest PA And Lateral; Future                "

## 2017-06-19 NOTE — TELEPHONE ENCOUNTER
Patient has been advised that his insect bite is fine and that Provider stated that it is healing up fine and continue to us soap and water to clean area .    Patient verbalized great understanding .      Thanks Dr. Marti

## 2017-06-19 NOTE — TELEPHONE ENCOUNTER
----- Message from Steven Kerr MD sent at 6/19/2017 12:45 PM CDT -----  Can we schedule a visit with Tonie in a week to evaluate Crohn's.

## 2017-06-19 NOTE — TELEPHONE ENCOUNTER
----- Message from Arlyn Chapman sent at 6/19/2017  2:39 PM CDT -----  Contact: self [phone]  Patient is calling requesting to speak with someone regarding today's appointment and medication that needs to be sent to his pharmacy. Please call patient back and advise.    Thank you

## 2017-06-19 NOTE — TELEPHONE ENCOUNTER
Reason for Disposition   Spider bite(s)   Red or very tender (to touch) area, getting larger over 48 hours after the bite    Protocols used: ST INSECT BITE-A-OH, ST SPIDER BITE-A-OH

## 2017-06-30 ENCOUNTER — OFFICE VISIT (OUTPATIENT)
Dept: GASTROENTEROLOGY | Facility: CLINIC | Age: 37
End: 2017-06-30
Payer: COMMERCIAL

## 2017-06-30 ENCOUNTER — TELEPHONE (OUTPATIENT)
Dept: GASTROENTEROLOGY | Facility: CLINIC | Age: 37
End: 2017-06-30

## 2017-06-30 VITALS
SYSTOLIC BLOOD PRESSURE: 125 MMHG | DIASTOLIC BLOOD PRESSURE: 78 MMHG | HEIGHT: 70 IN | BODY MASS INDEX: 23.1 KG/M2 | HEART RATE: 85 BPM | WEIGHT: 161.38 LBS

## 2017-06-30 DIAGNOSIS — K50.80 CROHN'S DISEASE OF BOTH SMALL AND LARGE INTESTINE WITHOUT COMPLICATION: Primary | ICD-10-CM

## 2017-06-30 PROCEDURE — 99999 PR PBB SHADOW E&M-EST. PATIENT-LVL III: CPT | Mod: PBBFAC,,, | Performed by: NURSE PRACTITIONER

## 2017-06-30 PROCEDURE — 99214 OFFICE O/P EST MOD 30 MIN: CPT | Mod: S$GLB,,, | Performed by: NURSE PRACTITIONER

## 2017-06-30 NOTE — PROGRESS NOTES
"    [facility] Note    Reason for Visit:  The encounter diagnosis was Crohn's disease of both small and large intestine without complication.    PCP:   Liana Selby       Referring MD:  No referring provider defined for this encounter.    HPI:  This is a 37 y.o. male here for f/u eval of Crohn's.  Mr. Toro has a hx of Barry's dx at age 18, but has has s/s since early teens. He is by  in GI.  His last visit was with Dr. Kerr in 2/2017. He has a hx of stricture of the TI with ileocolectomy in 1/2017 . He takes 40 mg Humira  injections every two weeks for maintenance since 2009, but has been off since Dec 2012 d/t insurance coverage issues.  He has previously failed imuran, methotrexate, and remicade therapy. He currently reports he would like to restart Humira.  He reports having a hx of perianal abscesses w/ drains and surgery and feels he may have had one last week. He states he had a low grade fever last week, was passing mucus in BMs and felt a "squishy  Liquidy" area around the perineum. He states it was also painful to sit.  he feels it went away on its own d/t the pain got better and the fluid feeling subsided. He states he is feeling better now. He is currently having 1 formed BM/every other day. He will skip 3-4 days, then have a large volume looser BM- takes stool softeners, metamucil PRN. Saw a few small "clots of dark blood" two days ago. No nocturnal stools.    Abdominal pain-none. Some nausea and low grade fever 3 days ago.  Reflux - no  Dysphagia/odynophagia - no   GI bleeding - denies hematochezia, hematemesis,  BRBPR, black/tarry stools, and coffee ground emesis  NSAID usage -denies.    ROS:  Constitutional: reported low grade fevers-a few days ago. no chills, No unintentional weight loss, + fatigue,   ENT: + allergies  CV: No chest pain, no palpitations, no perif. edema, no sob on exertion  Pulm: No cough, No shortness of breath, no wheezes, no " "sputum  Ophtho: No vision changes  GI: see HPI; also + nausea a few days ago, no vomiting, no change in appetite  Derm: No rash  Heme: No lymphadenopathy, No bruising  MSK: + arthrialgias, no muscle pain, no muscle weakness  : No dysuria, No hematuria  Endo: No hot or cold intolerance  Neuro: No syncope, No seizure,       Medical History:  has a past medical history of Allergy; Chronic diarrhea; Crohn disease; DDD (degenerative disc disease), cervical; Joint pain; Keloid cicatrix; Ulcer; and Ulcerative colitis.    Surgical History:  has a past surgical history that includes Abscess drainage and Colonoscopy (N/A, 12/5/2016).    Family History: family history includes Arthritis in his father; Cancer in his maternal grandmother..     Social History:  reports that he has never smoked. He has never used smokeless tobacco. He reports that he does not drink alcohol or use drugs.    Allergies   Allergen Reactions    Remicade [Infliximab]        Current Outpatient Prescriptions   Medication Sig    adalimumab (HUMIRA PEN [facility]'S-[facility] START) PnKt injection 160 mg (4 pens) week 0, 80 mg (2 pens) week 2    adalimumab (HUMIRA PEN) PnKt injection Inject 0.8 mLs (40 mg total) into the skin every 14 (fourteen) days.    fluocinonide 0.05% (LIDEX) 0.05 % cream Apply sparingly bid to affected areas    oxycodone-acetaminophen (PERCOCET) 5-325 mg per tablet Take 1-2 tablets by mouth every 4 (four) hours as needed.    silver sulfADIAZINE 1% (SILVADENE) 1 % cream Apply topically 2 (two) times daily. Apply sparingly bid to affected areas     No current facility-administered medications for this visit.        Objective Findings:    Vital Signs:  /78   Pulse 85   Ht 5' 10" (1.778 m)   Wt 73.2 kg (161 lb 6 oz)   BMI 23.16 kg/m²   Body mass index is 23.16 kg/m².    Physical Exam:  General Appearance: appearing ill, but stable.   Heat:   Normocephalic, without obvious abnormality  Eyes:    No scleral icterus, EOMI  ENT: Neck " "supple, Lips, mucosa, and tongue normal; teeth and gums normal  Lungs: CTA bilaterally in anterior and posterior fields, no wheezes, no crackles.  Heart:  Regular rate and rhythm, S1, S2 normal, no murmurs heard  Abdomen: Soft, non tender, non distended with hypoactive bowel sounds in all four quadrants. No hepatosplenomegaly, ascites, or mass  Extremities: 2+ radial pulses, no clubbing, cyanosis or edema  Skin: No rash to exposed areas  Neurologic: A&Ox4      Labs:  Lab Results   Component Value Date    WBC 5.94 2017    HGB 12.1 (L) 2017    HCT 37.7 (L) 2017     2017    CHOL 186 2015    TRIG 103 2014    HDL 32 (L) 2014    ALT 13 2017    AST 17 2017     2017    K 4.0 2017     2017    CREATININE 1.0 2017    BUN 20 2017    CO2 23 2017    INR 1.0 2016       Imagin2016 CT abd/pelvis-chronic TI changes (narrow caliber). Kidney cysts     2014 CT abd/pelvis-" Mild pericecal inflammatory change, noting circumferential wall thickening of the terminal ileum, likely resulting in stricture and upstream dilation of several proximal loops of small bowel concerning for developing inflammatory   stricture/obstruction.  There is a paucity of air within the remaining large bowel. There is reactive mild lymphadenopathy.  No organized fluid collection is appreciated, or definite free air. Overall, this likely represents sequela of recurrent/chronic   ileal inflammatory change, suggesting underlying inflammatory bowel disease."    2016 abd xray- "No free air in the abdomen.  Slight bowel dilatation noted in mid abdomen with few air-fluid level is identified.  Small amount of gas seen in the colon.  Partial bowel obstruction cannot be ruled out.  Clinical correlation is necessary."  Endoscopy:    2014 EGD Dr. Mari-normal esophagus. Gastritis. Normal duodenum. bx-chronic gastritis. No h. " Pylori.    1/29/2014  Colonoscopy Dr. Mari-scarring from previous perianal disease. Benign appearing intrinsic moderate stenosis biopsied. Normal colon. bx-no pathological change  Assessment:  1. Crohn's disease of both small and large intestine without complication           Recommendations:  1. Crohn's- restart Humira as RX (with loading dose). Labs to include quant gold.   I discussed this case with Dr. Minor and he agrees this is a reasonable plan of care.     f/u in 2 months with .      Orders Placed This Encounter    Quantiferon Gold TB    C-reactive protein    CBC auto differential    Comprehensive metabolic panel    adalimumab (HUMIRA PEN [facility] START) PnKt injection    adalimumab (HUMIRA PEN) PnKt injection       Thank you so much for allowing me to participate in the care of Escobar Chilel, KISHA, FNP-C

## 2017-06-30 NOTE — TELEPHONE ENCOUNTER
Humira prescribed by Tonie Chilel NP. Patient declined to sign up for Humira  Ambassador program. Patient stated, he's been taking Humira for many years and he does not need any assistance with medication.    Provider notified.

## 2017-07-03 ENCOUNTER — LAB VISIT (OUTPATIENT)
Dept: LAB | Facility: HOSPITAL | Age: 37
End: 2017-07-03
Payer: COMMERCIAL

## 2017-07-03 DIAGNOSIS — K50.80 CROHN'S DISEASE OF BOTH SMALL AND LARGE INTESTINE WITHOUT COMPLICATION: ICD-10-CM

## 2017-07-05 ENCOUNTER — LAB VISIT (OUTPATIENT)
Dept: LAB | Facility: HOSPITAL | Age: 37
End: 2017-07-05
Payer: COMMERCIAL

## 2017-07-05 DIAGNOSIS — Z79.899 ENCOUNTER FOR LONG-TERM (CURRENT) USE OF HIGH-RISK MEDICATION: Primary | ICD-10-CM

## 2017-07-05 DIAGNOSIS — Z79.899 ENCOUNTER FOR LONG-TERM (CURRENT) USE OF HIGH-RISK MEDICATION: ICD-10-CM

## 2017-07-05 PROCEDURE — 86480 TB TEST CELL IMMUN MEASURE: CPT

## 2017-07-05 PROCEDURE — 36415 COLL VENOUS BLD VENIPUNCTURE: CPT

## 2017-07-07 ENCOUNTER — TELEPHONE (OUTPATIENT)
Dept: PHARMACY | Facility: CLINIC | Age: 37
End: 2017-07-07

## 2017-07-07 DIAGNOSIS — K50.80 CROHN'S DISEASE OF BOTH SMALL AND LARGE INTESTINE WITHOUT COMPLICATION: ICD-10-CM

## 2017-07-07 LAB
MITOGEN NIL: 9.07 IU/ML
NIL: 0.03 IU/ML
TB ANTIGEN NIL: 0.04 IU/ML
TB ANTIGEN: 0.07 IU/ML
TB GOLD: NEGATIVE

## 2017-07-07 NOTE — TELEPHONE ENCOUNTER
DOCUMENTATION ONLY:  Humira Approved  Approval Dates: 7/3/17-7/3/19  PA#: [id number]  (Starter & Maintenance)  $25.00 copay

## 2017-07-07 NOTE — PROGRESS NOTES
RX Humira starter kit and injection kit d/c so that they can be reordered and sent to preferred specialty clinic as advised per [facility].     DEWEY Carson

## 2017-09-08 ENCOUNTER — OFFICE VISIT (OUTPATIENT)
Dept: GASTROENTEROLOGY | Facility: CLINIC | Age: 37
End: 2017-09-08
Payer: COMMERCIAL

## 2017-09-08 VITALS
SYSTOLIC BLOOD PRESSURE: 120 MMHG | BODY MASS INDEX: 23.2 KG/M2 | HEIGHT: 72 IN | HEART RATE: 103 BPM | WEIGHT: 171.31 LBS | DIASTOLIC BLOOD PRESSURE: 78 MMHG

## 2017-09-08 DIAGNOSIS — K50.919 CROHN'S DISEASE WITH COMPLICATION, UNSPECIFIED GASTROINTESTINAL TRACT LOCATION: Primary | ICD-10-CM

## 2017-09-08 PROCEDURE — 99999 PR PBB SHADOW E&M-EST. PATIENT-LVL III: CPT | Mod: PBBFAC,,, | Performed by: INTERNAL MEDICINE

## 2017-09-08 PROCEDURE — 3008F BODY MASS INDEX DOCD: CPT | Mod: S$GLB,,, | Performed by: INTERNAL MEDICINE

## 2017-09-08 PROCEDURE — 99213 OFFICE O/P EST LOW 20 MIN: CPT | Mod: S$GLB,,, | Performed by: INTERNAL MEDICINE

## 2017-09-08 NOTE — PROGRESS NOTES
REASON FOR VISIT:  Crohn's colitis.    Ms. Toro is a 37-year-old status post ileocecal resection for fibrostenotic   disease secondary to Crohn's colitis who was supposed to resume Humira   postoperatively, but due to delay in getting medication from the pharmacy, he   only started sometime in July and is currently post his loading dose and one   maintenance dose.  Overall, he is feeling little better, but he is complaining   of some fatigue and malaise.  He has some mild abdominal pain with loose bowel   movements anywhere from one to two sometimes four to five times a day.  Overall,   he is feeling much better.  He did have an episode of a perianal abscess   drainage, which resolved on its own without any medical treatment.  Today, we   discussed about of doing labs in November and revisiting November to determine   his clinical course.  I would also recommend proceeding with a colonoscopy   sometime early next year to reassess the anastomosis, otherwise no new   complaints today.    PAST MEDICAL, SURGICAL, SOCIAL AND FAMILY HISTORY:  Reviewed.    MEDICATIONS AND ALLERGIES:  Reviewed.    REVIEW OF SYSTEMS:  CONSTITUTIONAL:  No fever, no chills.  No weight loss.  Appetite is stable.  EYES:  No visual changes.  No red eyes.  ENT:  No odynophagia or hoarseness of voice.  CARDIOVASCULAR:  No angina or palpitation.  RESPIRATORY:  No shortness of breath or wheezing.  GENITOURINARY:  No dysuria or frequency.  MUSCULOSKELETAL:  No myalgia, no arthralgia.  SKIN:  No pruritus or eczema.  NEUROLOGIC:  No headache or seizures.  PSYCHIATRIC:  No anxiety or depression.  GASTROINTESTINAL:  See HPI.    PHYSICAL EXAMINATION:  VITAL SIGNS:  See EPIC, alert, oriented x3, no acute distress.  NECK:  Supple.  No carotid bruit.  ABDOMEN:  Flat, soft, nondistended.  Mild tenderness to deep palpation in the   right lower quadrant, but no guarding or rigidity.  No abdominal bruits heard.  EYES:  Conjunctivae anicteric.  ENT:   Oropharynx, mucosa moist.  CARDIOVASCULAR:  S1, S2 normal.  RESPIRATORY:  Bilateral air entry equal.    IMPRESSION:  Status post ileocecal resection in January of 2016 for Crohn's   colitis, currently back on Humira post-loading dose and maintenance.    RECOMMENDATION:  Continue Humira maintenance and follow up in November with CBC,   CMP and CRP.  We will reassess clinically and plan on endoscopic evaluation   sometime in January.      ACT/IN  dd: 09/08/2017 16:10:34 (CDT)  td: 09/09/2017 04:09:39 (CDT)  Doc ID   #[id number]  Job ID #[id number]    CC:

## 2017-09-08 NOTE — LETTER
September 8, 2017      Johnnie Marti MD  [address]  [place] [address]           [facility]  [address]  [facility] [address]  Phone: [phone]  Fax: [phone]          Patient: Escobar Toro   MR Number: [id number]   YOB: 1980   Date of Visit: 9/8/2017       Dear Dr. Johnnie Marti:    Thank you for referring Escobar Toro to me for evaluation. Attached you will find relevant portions of my assessment and plan of care.    If you have questions, please do not hesitate to call me. I look forward to following Escobar Toro along with you.    Sincerely,    Steven Kerr MD    Enclosure  CC:  No Recipients    If you would like to receive this communication electronically, please contact [email or web address] or [phone] to request more information on [employer] access.    For providers and/or their staff who would like to refer a patient to [facility], please contact us through our one-stop-shop provider referral line, [facility], at [phone].    If you feel you have received this communication in error or would no longer like to receive these types of communications, please e-mail [email or web address]

## 2018-01-12 ENCOUNTER — TELEPHONE (OUTPATIENT)
Dept: GASTROENTEROLOGY | Facility: CLINIC | Age: 38
End: 2018-01-12

## 2018-01-12 NOTE — TELEPHONE ENCOUNTER
Informed by SEBASTIÁN Chilel that she spoke with Dr Kerr regarding refill and he suggested that pt is to follow up in clinic and get current labs before Humira is refilled.    Message relayed to Sarai ford/ Michi MUNOZ.  She will note and notify pt

## 2018-01-31 ENCOUNTER — OFFICE VISIT (OUTPATIENT)
Dept: GASTROENTEROLOGY | Facility: CLINIC | Age: 38
End: 2018-01-31
Payer: COMMERCIAL

## 2018-01-31 VITALS
HEIGHT: 72 IN | SYSTOLIC BLOOD PRESSURE: 135 MMHG | HEART RATE: 83 BPM | BODY MASS INDEX: 25.18 KG/M2 | WEIGHT: 185.88 LBS | DIASTOLIC BLOOD PRESSURE: 81 MMHG

## 2018-01-31 DIAGNOSIS — K50.119 CROHN'S DISEASE OF COLON WITH COMPLICATION: Primary | ICD-10-CM

## 2018-01-31 DIAGNOSIS — K50.80 CROHN'S DISEASE OF BOTH SMALL AND LARGE INTESTINE WITHOUT COMPLICATION: ICD-10-CM

## 2018-01-31 PROCEDURE — 3008F BODY MASS INDEX DOCD: CPT | Mod: S$GLB,,, | Performed by: INTERNAL MEDICINE

## 2018-01-31 PROCEDURE — 99999 PR PBB SHADOW E&M-EST. PATIENT-LVL III: CPT | Mod: PBBFAC,,, | Performed by: INTERNAL MEDICINE

## 2018-01-31 PROCEDURE — 99213 OFFICE O/P EST LOW 20 MIN: CPT | Mod: S$GLB,,, | Performed by: INTERNAL MEDICINE

## 2018-01-31 NOTE — PROGRESS NOTES
GASTROENTEROLOGY CLINIC NOTE    Reason for visit: The encounter diagnosis was Crohn's disease of colon with complication.  Referring provider/PCP: Liana Selby MD    CC: follow up CD    HPI:  Escobar Toro is a 37 y.o. male with Crohn's disease of small bowel and colon, complicated by perianal abscesses (has resolved), s/p ileocolectomy in 1/2017, here for routine follow up.  Mr. Toro has a hx of Barry's dx at age 18, but has has s/s since early teens (as early as 12 year old). He hada hx of stricture of the TI with ileocolectomy in 1/2017 . He takes 40 mg Humira  injections every two weeks for maintenance since 2009, but has been off since Dec 2012 d/t insurance coverage issues.  He has previously failed imuran, methotrexate, and remicade therapy.     Today, he denies having any new complaints and reports feeling well.   Formed bowel movements, no diarrhea, no bloody BM. Reports mostly once daily bowel movement.   No rectal pain or abdominal pain. Stable weight.   No rash, joint pain. No fever.   No rectal fistula, abscess, drainage.   Last C-scope in 2016.   Most recent labs in 7/2017 - mild anemia. Normal CRP.     Has not seen dermatology over a year. No flu vaccine this year but reports not interested.    Review of Systems   Constitutional: Negative for chills, fever and weight loss.   HENT: Negative for sore throat.    Eyes: Negative for blurred vision and double vision.   Respiratory: Negative for cough, hemoptysis and stridor.    Cardiovascular: Negative for chest pain and palpitations.   Gastrointestinal: Negative for abdominal pain, blood in stool, constipation, diarrhea, heartburn, melena, nausea and vomiting.   Genitourinary: Negative for dysuria and flank pain.   Musculoskeletal: Negative for joint pain.   Skin: Negative for rash.   Neurological: Negative for dizziness and seizures.   Endo/Heme/Allergies: Negative for polydipsia.   Psychiatric/Behavioral: Negative for depression  and suicidal ideas.       Past Medical History: has a past medical history of Allergy; Chronic diarrhea; Crohn disease; DDD (degenerative disc disease), cervical; Joint pain; Keloid cicatrix; Ulcer; and Ulcerative colitis.    Past Surgical History: has a past surgical history that includes Abscess drainage and Colonoscopy (N/A, 12/5/2016).    Family History:family history includes Arthritis in his father; Cancer in his maternal grandmother.    Allergies:   Review of patient's allergies indicates:   Allergen Reactions    Remicade [infliximab]        Social History: reports that he has never smoked. He has never used smokeless tobacco. He reports that he does not drink alcohol or use drugs.     Home medications:   Current Outpatient Prescriptions on File Prior to Visit   Medication Sig Dispense Refill    adalimumab (HUMIRA PEN CROHN'S-UC-HS START) PnKt injection 160 mg (4 pens) week 0, 80 mg (2 pens) week 2 6 each 0    fluocinonide 0.05% (LIDEX) 0.05 % cream Apply sparingly bid to affected areas 30 g 0    oxycodone-acetaminophen (PERCOCET) 5-325 mg per tablet Take 1-2 tablets by mouth every 4 (four) hours as needed. 91 tablet 0    silver sulfADIAZINE 1% (SILVADENE) 1 % cream Apply topically 2 (two) times daily. Apply sparingly bid to affected areas 25 g 1    [DISCONTINUED] adalimumab (HUMIRA PEN) PnKt injection Inject 0.8 mLs (40 mg total) into the skin every 14 (fourteen) days. 2 each 5     No current facility-administered medications on file prior to visit.        Vital signs:  /81   Pulse 83   Ht 6' (1.829 m)   Wt 84.3 kg (185 lb 13.6 oz)   BMI 25.21 kg/m²     Physical Exam   Constitutional: He is oriented to person, place, and time. He appears well-developed.   HENT:   Head: Normocephalic and atraumatic.   Eyes: No scleral icterus.   Neck: Neck supple.   Cardiovascular: Normal rate.  Exam reveals no gallop and no friction rub.    Pulmonary/Chest: Effort normal and breath sounds normal.   Abdominal:  Soft. Bowel sounds are normal. He exhibits no distension and no mass. There is no tenderness. There is no rebound and no guarding. No hernia.   Abdominal scar   Musculoskeletal: He exhibits no edema or tenderness.   Neurological: He is alert and oriented to person, place, and time.   Skin: Skin is warm.   Psychiatric: He has a normal mood and affect.   Vitals reviewed.      Routine labs:  Lab Results   Component Value Date    WBC 5.94 07/03/2017    HGB 12.1 (L) 07/03/2017    HCT 37.7 (L) 07/03/2017    MCV 91 07/03/2017     07/03/2017     Lab Results   Component Value Date    INR 1.0 09/07/2016     No results found for: IRON, FERRITIN, TIBC, FESATURATED  Lab Results   Component Value Date     07/03/2017    K 4.0 07/03/2017     07/03/2017    CO2 23 07/03/2017    BUN 20 07/03/2017    CREATININE 1.0 07/03/2017     Lab Results   Component Value Date    ALBUMIN 3.7 07/03/2017    ALT 13 07/03/2017    AST 17 07/03/2017    ALKPHOS 86 07/03/2017    BILITOT 1.2 (H) 07/03/2017     No results found for: GLUCOSE    Imaging:  As noted in HPI.    Assessment:  Escobar Toro is a 37 y.o. male with Crohn's disease of small bowel and colon, complicated by perianal abscesses (has resolved), s/p ileocolectomy in 1/2017. Been on Humira since surgery. His disease in remission. Discussed the preventive care. He needed influenza vaccine (declined) and dermatology visit (he will schedule). We will also obtain routine labs and colonoscopy for CRC screening as well as disease activity monitoring.    1. Crohn's disease of colon with complication        Plan:  Orders Placed This Encounter    CBC auto differential    Comprehensive metabolic panel    C-REACTIVE PROTEIN    Case request GI: COLONOSCOPY  Continue Humira (refill ordered)     Clary Richards MD  Gastroenterology & Hepatology Fellow  Pager: [phone]

## 2018-02-01 ENCOUNTER — TELEPHONE (OUTPATIENT)
Dept: GASTROENTEROLOGY | Facility: CLINIC | Age: 38
End: 2018-02-01

## 2018-02-19 RX ORDER — ADALIMUMAB 40MG/0.8ML
KIT SUBCUTANEOUS
OUTPATIENT
Start: 2018-02-19

## 2018-04-12 NOTE — TELEPHONE ENCOUNTER
----- Message from Jessi Evangelista sent at 1/12/2018  9:03 AM CST -----  Contact: Deisy from [facility]  Deisy is calling to speak with nurse in regards to medication authorizations [phone], follow prompt for pharmacist.    Thank you   1900- Bedside report done, patient in bed resting. Denies pain @ this time. Will continue to monitor.   2100- Up to BR with assistance, ambulating well with steady gait. Perineal care rendered and back to bed comfortably. No dizziness noted.

## 2018-05-28 ENCOUNTER — OFFICE VISIT (OUTPATIENT)
Dept: INTERNAL MEDICINE | Facility: CLINIC | Age: 38
End: 2018-05-28
Payer: COMMERCIAL

## 2018-05-28 VITALS
HEIGHT: 72 IN | DIASTOLIC BLOOD PRESSURE: 85 MMHG | WEIGHT: 176.56 LBS | BODY MASS INDEX: 23.92 KG/M2 | SYSTOLIC BLOOD PRESSURE: 121 MMHG | HEART RATE: 105 BPM | TEMPERATURE: 100 F

## 2018-05-28 DIAGNOSIS — J32.0 MAXILLARY SINUSITIS, UNSPECIFIED CHRONICITY: Primary | ICD-10-CM

## 2018-05-28 DIAGNOSIS — K50.10 CROHN'S DISEASE OF COLON WITHOUT COMPLICATION: ICD-10-CM

## 2018-05-28 PROCEDURE — 3008F BODY MASS INDEX DOCD: CPT | Mod: CPTII,S$GLB,, | Performed by: PHYSICIAN ASSISTANT

## 2018-05-28 PROCEDURE — 99999 PR PBB SHADOW E&M-EST. PATIENT-LVL IV: CPT | Mod: PBBFAC,,, | Performed by: PHYSICIAN ASSISTANT

## 2018-05-28 PROCEDURE — 99213 OFFICE O/P EST LOW 20 MIN: CPT | Mod: S$GLB,,, | Performed by: PHYSICIAN ASSISTANT

## 2018-05-28 RX ORDER — AMOXICILLIN AND CLAVULANATE POTASSIUM 875; 125 MG/1; MG/1
1 TABLET, FILM COATED ORAL EVERY 12 HOURS
Qty: 14 TABLET | Refills: 0 | Status: SHIPPED | OUTPATIENT
Start: 2018-05-28 | End: 2018-09-17

## 2018-05-28 NOTE — PROGRESS NOTES
Subjective:       Patient ID: Escobar Toro is a 38 y.o. male.        Chief Complaint: Sinusitis    Escobar Toro is an established patient of Liana Selby MD here today for urgent care visit.    Sx started 5/18 with mild sore throat, nasal drainage.  Sx mild at first but sx progressed.  Worsened sore throat, cough productive of yellow mucus, nasal congestion, chest congestion.  He has been taking robitussin and benadryl.  Now feeling fatigued as well and noticed some more focal tenderness in the right maxillary sinus and right ear.  Also with tooth pain on right side.  No shortness of breath or chest pain.  No N/V.  He has had some mild diarrhea, loose stools.  No abdominal pain or bleeding.  He has felt feverish but has not checked temperature.  Mild headache and body aches.      Crohn's: on humira.             Review of Systems   Constitutional: Negative for appetite change, chills, fatigue and fever.   HENT: Positive for congestion, postnasal drip, sinus pain, sinus pressure and sore throat.    Eyes: Negative for visual disturbance.   Respiratory: Positive for cough. Negative for chest tightness and shortness of breath.    Cardiovascular: Negative for chest pain, palpitations and leg swelling.   Gastrointestinal: Negative for abdominal pain, blood in stool, constipation, diarrhea, nausea and vomiting.   Genitourinary: Negative for dysuria, frequency, hematuria and urgency.   Musculoskeletal: Positive for myalgias. Negative for arthralgias and back pain.   Skin: Negative for rash.   Neurological: Positive for headaches. Negative for dizziness, syncope and weakness.   Psychiatric/Behavioral: Negative for dysphoric mood and sleep disturbance. The patient is not nervous/anxious.        Objective:      Physical Exam   Constitutional: He appears well-developed and well-nourished. No distress.   HENT:   Head: Normocephalic and atraumatic.   Right Ear: External ear and ear canal normal. A middle  ear effusion is present.   Left Ear: External ear and ear canal normal. A middle ear effusion is present.   Nose: Mucosal edema and rhinorrhea present. Right sinus exhibits maxillary sinus tenderness. Right sinus exhibits no frontal sinus tenderness. Left sinus exhibits no maxillary sinus tenderness and no frontal sinus tenderness.   Mouth/Throat: Posterior oropharyngeal erythema present. No oropharyngeal exudate, posterior oropharyngeal edema or tonsillar abscesses.   Eyes: Conjunctivae are normal. Pupils are equal, round, and reactive to light.   Cardiovascular: Normal rate, regular rhythm and normal heart sounds.  Exam reveals no gallop and no friction rub.    No murmur heard.  Pulmonary/Chest: Effort normal and breath sounds normal. No respiratory distress.   Abdominal: Soft. There is no tenderness. There is no rebound and no guarding.   Musculoskeletal: He exhibits no edema.   Neurological: He is alert.   Skin: Skin is warm and dry. He is not diaphoretic.   Psychiatric: He has a normal mood and affect.   Nursing note and vitals reviewed.      Assessment:       1. Maxillary sinusitis, unspecified chronicity    2. Crohn's disease of colon without complication        Plan:       Escobar was seen today for sinusitis.    Diagnoses and all orders for this visit:    Maxillary sinusitis, unspecified chronicity  -     amoxicillin-clavulanate 875-125mg (AUGMENTIN) 875-125 mg per tablet; Take 1 tablet by mouth every 12 (twelve) hours.    Crohn's disease of colon without complication: continue humira and regular gastro follow up    Drink plenty of fluids, get lots of rest, and follow-up poor results.      Pt has been given instructions populated from [employer] database and has verbalized understanding of the after visit summary and information contained wherein.    Follow up with a primary care provider. May go to ER for acute shortness of breath, lightheadedness, fever, or any other emergent complaints or changes in  "condition.    "This note will be shared with the patient"    No future appointments.            "

## 2018-05-28 NOTE — PATIENT INSTRUCTIONS

## 2018-08-27 ENCOUNTER — TELEPHONE (OUTPATIENT)
Dept: ENDOSCOPY | Facility: HOSPITAL | Age: 38
End: 2018-08-27

## 2018-09-17 ENCOUNTER — TELEPHONE (OUTPATIENT)
Dept: INTERNAL MEDICINE | Facility: CLINIC | Age: 38
End: 2018-09-17

## 2018-09-17 ENCOUNTER — OFFICE VISIT (OUTPATIENT)
Dept: INTERNAL MEDICINE | Facility: CLINIC | Age: 38
End: 2018-09-17
Payer: COMMERCIAL

## 2018-09-17 VITALS
TEMPERATURE: 99 F | WEIGHT: 175.25 LBS | DIASTOLIC BLOOD PRESSURE: 82 MMHG | OXYGEN SATURATION: 98 % | HEIGHT: 72 IN | HEART RATE: 112 BPM | BODY MASS INDEX: 23.74 KG/M2 | SYSTOLIC BLOOD PRESSURE: 116 MMHG

## 2018-09-17 DIAGNOSIS — J02.0 STREP PHARYNGITIS: Primary | ICD-10-CM

## 2018-09-17 LAB — DEPRECATED S PYO AG THROAT QL EIA: POSITIVE

## 2018-09-17 PROCEDURE — 87880 STREP A ASSAY W/OPTIC: CPT

## 2018-09-17 PROCEDURE — 99999 PR PBB SHADOW E&M-EST. PATIENT-LVL IV: CPT | Mod: PBBFAC,,, | Performed by: PHYSICIAN ASSISTANT

## 2018-09-17 PROCEDURE — 3008F BODY MASS INDEX DOCD: CPT | Mod: CPTII,S$GLB,, | Performed by: PHYSICIAN ASSISTANT

## 2018-09-17 PROCEDURE — 99213 OFFICE O/P EST LOW 20 MIN: CPT | Mod: S$GLB,,, | Performed by: PHYSICIAN ASSISTANT

## 2018-09-17 RX ORDER — AMOXICILLIN 875 MG/1
875 TABLET, FILM COATED ORAL 2 TIMES DAILY
Qty: 20 TABLET | Refills: 0 | Status: SHIPPED | OUTPATIENT
Start: 2018-09-17 | End: 2018-09-27

## 2018-09-19 NOTE — PROGRESS NOTES
Subjective:       Patient ID: Escobar Toro is a 38 y.o. male.        Chief Complaint: Sore Throat (4 days)    Escobar Toro is an established patient of Liana Selby MD here today for urgent care visit.    Sore throat x 4 days, fever up to 101.4.  Tender, swollen lymph nodes in neck.  Able to swallow.  No cough.           Review of Systems   Constitutional: Positive for fever. Negative for appetite change, chills and fatigue.   HENT: Positive for sore throat. Negative for congestion.    Eyes: Negative for visual disturbance.   Respiratory: Negative for cough, chest tightness and shortness of breath.    Cardiovascular: Negative for chest pain, palpitations and leg swelling.   Gastrointestinal: Negative for abdominal pain, blood in stool, constipation, diarrhea, nausea and vomiting.   Genitourinary: Negative for dysuria, frequency, hematuria and urgency.   Musculoskeletal: Negative for arthralgias and back pain.   Skin: Negative for rash.   Neurological: Negative for dizziness, syncope, weakness and headaches.   Psychiatric/Behavioral: Negative for dysphoric mood and sleep disturbance. The patient is not nervous/anxious.        Objective:      Physical Exam   Constitutional: He appears well-developed and well-nourished. No distress.   HENT:   Head: Normocephalic and atraumatic.   Right Ear: Tympanic membrane, external ear and ear canal normal.   Left Ear: Tympanic membrane, external ear and ear canal normal.   Nose: Nose normal.   Mouth/Throat: Oropharyngeal exudate, posterior oropharyngeal edema and posterior oropharyngeal erythema present. No tonsillar abscesses.   Eyes: Conjunctivae are normal. Pupils are equal, round, and reactive to light.   Cardiovascular: Normal rate, regular rhythm and normal heart sounds. Exam reveals no gallop and no friction rub.   No murmur heard.  Pulmonary/Chest: Effort normal and breath sounds normal. No respiratory distress.   Abdominal: Soft. There is no  "tenderness. There is no rebound and no guarding.   Musculoskeletal: He exhibits no edema.   Neurological: He is alert.   Skin: Skin is warm and dry. He is not diaphoretic.   Psychiatric: He has a normal mood and affect.   Nursing note and vitals reviewed.      Assessment:       1. Sore throat        Plan:       Escobar was seen today for sore throat.    Diagnoses and all orders for this visit:    Sore throat  -     Throat Screen, Rapid  -     amoxicillin (AMOXIL) 875 MG tablet; Take 1 tablet (875 mg total) by mouth 2 (two) times daily. for 10 days    Rapid strep test positive, tx with amoxil as above.    Pt has been given instructions populated from [employer] database and has verbalized understanding of the after visit summary and information contained wherein.    Follow up with a primary care provider. May go to ER for acute shortness of breath, lightheadedness, fever, or any other emergent complaints or changes in condition.    "This note will be shared with the patient"    No future appointments.            "

## 2018-09-19 NOTE — PATIENT INSTRUCTIONS

## 2018-10-08 ENCOUNTER — OFFICE VISIT (OUTPATIENT)
Dept: DERMATOLOGY | Facility: CLINIC | Age: 38
End: 2018-10-08
Payer: COMMERCIAL

## 2018-10-08 VITALS — BODY MASS INDEX: 23.73 KG/M2 | WEIGHT: 175 LBS

## 2018-10-08 DIAGNOSIS — Z87.2 HISTORY OF PYODERMA: ICD-10-CM

## 2018-10-08 DIAGNOSIS — B35.3 TINEA PEDIS OF RIGHT FOOT: Primary | ICD-10-CM

## 2018-10-08 PROCEDURE — 87220 TISSUE EXAM FOR FUNGI: CPT | Mod: S$GLB,,, | Performed by: DERMATOLOGY

## 2018-10-08 PROCEDURE — 99999 PR PBB SHADOW E&M-EST. PATIENT-LVL III: CPT | Mod: PBBFAC,,, | Performed by: DERMATOLOGY

## 2018-10-08 PROCEDURE — 99214 OFFICE O/P EST MOD 30 MIN: CPT | Mod: S$GLB,,, | Performed by: DERMATOLOGY

## 2018-10-08 PROCEDURE — 3008F BODY MASS INDEX DOCD: CPT | Mod: CPTII,S$GLB,, | Performed by: DERMATOLOGY

## 2018-10-08 RX ORDER — FLUCONAZOLE 200 MG/1
200 TABLET ORAL DAILY
Qty: 4 TABLET | Refills: 0 | Status: SHIPPED | OUTPATIENT
Start: 2018-10-08 | End: 2018-10-08 | Stop reason: SDUPTHER

## 2018-10-08 RX ORDER — FLUCONAZOLE 200 MG/1
200 TABLET ORAL DAILY
Qty: 4 TABLET | Refills: 0 | Status: SHIPPED | OUTPATIENT
Start: 2018-10-08 | End: 2018-10-12

## 2018-10-08 NOTE — PROGRESS NOTES
Subjective:       Patient ID:  Escobar Toro is a 38 y.o. male who presents for   Chief Complaint   Patient presents with    Rash     right foot     History of Present Illness: The patient presents with chief complaint of rash.  Location: right foot  Duration: over a week  Signs/Symptoms: itch    Prior treatments: vinegar soaks           Review of Systems   Constitutional: Negative for fever.   Skin: Positive for itching and rash.   Hematologic/Lymphatic: Does not bruise/bleed easily.        Objective:    Physical Exam   Skin:   Areas Examined (abnormalities noted in diagram):   Head / Face Inspection Performed  Neck Inspection Performed  RUE Inspected  LUE Inspection Performed  RLE Inspected  Nails and Digits Inspection Performed             Diagram Legend     Erythematous scaling macule/papule c/w actinic keratosis       Vascular papule c/w angioma      Pigmented verrucoid papule/plaque c/w seborrheic keratosis      Yellow umbilicated papule c/w sebaceous hyperplasia      Irregularly shaped tan macule c/w lentigo     1-2 mm smooth white papules consistent with Milia      Movable subcutaneous cyst with punctum c/w epidermal inclusion cyst      Subcutaneous movable cyst c/w pilar cyst      Firm pink to brown papule c/w dermatofibroma      Pedunculated fleshy papule(s) c/w skin tag(s)      Evenly pigmented macule c/w junctional nevus     Mildly variegated pigmented, slightly irregular-bordered macule c/w mildly atypical nevus      Flesh colored to evenly pigmented papule c/w intradermal nevus       Pink pearly papule/plaque c/w basal cell carcinoma      Erythematous hyperkeratotic cursted plaque c/w SCC      Surgical scar with no sign of skin cancer recurrence      Open and closed comedones      Inflammatory papules and pustules      Verrucoid papule consistent consistent with wart     Erythematous eczematous patches and plaques     Dystrophic onycholytic nail with subungual debris c/w onychomycosis      Umbilicated papule    Erythematous-base heme-crusted tan verrucoid plaque consistent with inflamed seborrheic keratosis     Erythematous Silvery Scaling Plaque c/w Psoriasis     See annotation      Assessment / Plan:        Tinea pedis of right foot  -     fluconazole (DIFLUCAN) 200 MG Tab; Take 1 tablet (200 mg total) by mouth once daily. for 4 days  Dispense: 4 tablet; Refill: 0  Zeasorb powder am    History of pyoderma  Mrsa, no recurrece           Follow-up if symptoms worsen or fail to improve.

## 2020-03-20 ENCOUNTER — OFFICE VISIT (OUTPATIENT)
Dept: URGENT CARE | Facility: CLINIC | Age: 40
End: 2020-03-20
Payer: COMMERCIAL

## 2020-03-20 VITALS
DIASTOLIC BLOOD PRESSURE: 93 MMHG | OXYGEN SATURATION: 99 % | SYSTOLIC BLOOD PRESSURE: 144 MMHG | HEIGHT: 72 IN | RESPIRATION RATE: 20 BRPM | BODY MASS INDEX: 23.89 KG/M2 | TEMPERATURE: 98 F | WEIGHT: 176.38 LBS | HEART RATE: 84 BPM

## 2020-03-20 DIAGNOSIS — J06.9 VIRAL URI WITH COUGH: Primary | ICD-10-CM

## 2020-03-20 DIAGNOSIS — R06.2 WHEEZING: ICD-10-CM

## 2020-03-20 LAB
CTP QC/QA: YES
FLUAV AG NPH QL: NEGATIVE
FLUBV AG NPH QL: NEGATIVE

## 2020-03-20 PROCEDURE — 87804 INFLUENZA ASSAY W/OPTIC: CPT | Mod: QW,S$GLB,, | Performed by: PEDIATRICS

## 2020-03-20 PROCEDURE — 87804 POCT INFLUENZA A/B: ICD-10-PCS | Mod: 59,QW,S$GLB, | Performed by: PEDIATRICS

## 2020-03-20 PROCEDURE — 99214 OFFICE O/P EST MOD 30 MIN: CPT | Mod: 25,S$GLB,, | Performed by: PEDIATRICS

## 2020-03-20 PROCEDURE — 99214 PR OFFICE/OUTPT VISIT, EST, LEVL IV, 30-39 MIN: ICD-10-PCS | Mod: 25,S$GLB,, | Performed by: PEDIATRICS

## 2020-03-20 PROCEDURE — 71046 X-RAY EXAM CHEST 2 VIEWS: CPT | Mod: S$GLB,,, | Performed by: RADIOLOGY

## 2020-03-20 PROCEDURE — 71046 XR CHEST PA AND LATERAL: ICD-10-PCS | Mod: S$GLB,,, | Performed by: RADIOLOGY

## 2020-03-20 RX ORDER — AZITHROMYCIN 250 MG/1
TABLET, FILM COATED ORAL
Qty: 6 TABLET | Refills: 0 | Status: SHIPPED | OUTPATIENT
Start: 2020-03-20 | End: 2020-03-25

## 2020-03-20 NOTE — PATIENT INSTRUCTIONS
Your symptoms are consistent with COVID-19 but you do not meet the [facility] criteria for screening at this time. Please self quarantine for 14 days from onset of symptoms and 3 days past resolution of symptoms.    You are also wheezing so please use the albuterol as directed and take the zithromax as directed for next 5 days.  Please call or report to ER if you have increased work of breathing, color change around your mouth or finger tips or lethargy/confusion.  Please drink plenty of fluids and rest.      Viral Upper Respiratory Illness (Adult)  You have a viral upper respiratory illness (URI), which is another term for the common cold. This illness is contagious during the first few days. It is spread through the air by coughing and sneezing. It may also be spread by direct contact (touching the sick person and then touching your own eyes, nose, or mouth). Frequent handwashing will decrease risk of spread. Most viral illnesses go away within 7 to 10 days with rest and simple home remedies. Sometimes the illness may last for several weeks. Antibiotics will not kill a virus, and they are generally not prescribed for this condition.    Home care  · If symptoms are severe, rest at home for the first 2 to 3 days. When you resume activity, don't let yourself get too tired.  · Avoid being exposed to cigarette smoke (yours or others).  · You may use acetaminophen or ibuprofen to control pain and fever, unless another medicine was prescribed. (Note: If you have chronic liver or kidney disease, have ever had a stomach ulcer or gastrointestinal bleeding, or are taking blood-thinning medicines, talk with your healthcare provider before using these medicines.) Aspirin should never be given to anyone under 18 years of age who is ill with a viral infection or fever. It may cause severe liver or brain damage.  · Your appetite may be poor, so a light diet is fine. Avoid dehydration by drinking 6 to 8 glasses of fluids per day  (water, soft drinks, juices, tea, or soup). Extra fluids will help loosen secretions in the nose and lungs.  · Over-the-counter cold medicines will not shorten the length of time youre sick, but they may be helpful for the following symptoms: cough, sore throat, and nasal and sinus congestion. (Note: Do not use decongestants if you have high blood pressure.)  Follow-up care  Follow up with your healthcare provider, or as advised.  When to seek medical advice  Call your healthcare provider right away if any of these occur:  · Cough with lots of colored sputum (mucus)  · Severe headache; face, neck, or ear pain  · Difficulty swallowing due to throat pain  · Fever of 100.4°F (38°C)  Call 911, or get immediate medical care  Call emergency services right away if any of these occur:  · Chest pain, shortness of breath, wheezing, or difficulty breathing  · Coughing up blood  · Inability to swallow due to throat pain  Date Last Reviewed: 9/13/2015  © 6457-5099 [employer]. [address] [place] [address], [place] [address]. All rights reserved. This information is not intended as a substitute for professional medical care. Always follow your healthcare professional's instructions.

## 2020-03-20 NOTE — PROGRESS NOTES
Subjective:       Patient ID: Escobar Toro is a 40 y.o. male.    Vitals:  height is 6' (1.829 m) and weight is 80 kg (176 lb 5.9 oz). His temperature is 97.8 °F (36.6 °C). His blood pressure is 144/93 (abnormal) and his pulse is 84. His respiration is 20 and oxygen saturation is 99%.     Chief Complaint: Cough    Patient has Chrohns disease. Has a cough and chest tightness. He also feels fatigued. x4-5 days. [phone]. Co worker was dx with COVID. [phone]    Also feels like he is wheezing - has used albuterol in the past but not with this illness, has had pneumonia in the past.  Intermittently dizzy.  Has been drinking a lot but has some diarrhea at a baseline so occasionally has dark urine.  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    Cough   This is a new problem. The current episode started yesterday. The problem has been unchanged. The problem occurs every few minutes. The cough is non-productive. Associated symptoms include myalgias and wheezing. Pertinent negatives include no chest pain, chills, fever, headaches, rash, sore throat or shortness of breath. Nothing aggravates the symptoms. He has tried nothing for the symptoms. The treatment provided no relief.       Constitution: Positive for fatigue. Negative for chills and fever.   HENT: Negative for congestion and sore throat.    Neck: Negative for painful lymph nodes.   Cardiovascular: Negative for chest pain and leg swelling.   Eyes: Negative for double vision and blurred vision.   Respiratory: Positive for chest tightness, cough and wheezing. Negative for shortness of breath.    Gastrointestinal: Positive for diarrhea. Negative for nausea and vomiting.   Genitourinary: Negative for dysuria, frequency and urgency.   Musculoskeletal: Positive for  muscle ache. Negative for joint pain, joint swelling and muscle cramps.   Skin: Negative for color change, pale and rash.   Allergic/Immunologic: Negative for seasonal allergies.   Neurological: Negative for dizziness, history of vertigo, light-headedness, passing out and headaches.   Hematologic/Lymphatic: Negative for swollen lymph nodes, easy bruising/bleeding and history of blood clots. Does not bruise/bleed easily.   Psychiatric/Behavioral: Negative for nervous/anxious, sleep disturbance and depression. The patient is not nervous/anxious.        Objective:      Physical Exam   Constitutional: He appears distressed.   Very weak and fatigued appearing   Eyes: Right eye exhibits no discharge. Left eye exhibits no discharge.   Neck: Neck supple.   Cardiovascular: Normal rate, regular rhythm and normal heart sounds.   No murmur heard.  Pulmonary/Chest: Effort normal. He has wheezes.   Diffuse exp wheezing - no prolonged phase no decreased breath sounds no crackles   Skin: Capillary refill takes 2 to 3 seconds.         Assessment:       1. Viral URI with cough    2. Wheezing        Plan:       Symptoms c/w COVID-19 with additional symptoms of wheezing.  Self isolate for 14 days from onset of symptoms and 3 days past resolution of your symptoms.  Use albuterol every 4 hours as needed for wheezing and take zithromax as directed.  Viral URI with cough  -     POCT Influenza A/B    Wheezing  -     POCT Influenza A/B  -     XR CHEST PA AND LATERAL; Future; Expected date: 03/20/2020    Other orders  -     albuterol sulfate (PROAIR RESPICLICK) 90 mcg/actuation AePB; Inhale 180 mcg into the lungs every 4 (four) hours. Rescue for 7 days  Dispense: 1 each; Refill: 0  -     azithromycin (Z-LORENZO) 250 MG tablet; Take 2 tablets by mouth on day 1; Take 1 tablet by mouth on days 2-5  Dispense: 6 tablet; Refill: 0

## 2020-07-07 ENCOUNTER — OFFICE VISIT (OUTPATIENT)
Dept: SURGERY | Facility: CLINIC | Age: 40
End: 2020-07-07
Payer: COMMERCIAL

## 2020-07-07 ENCOUNTER — OFFICE VISIT (OUTPATIENT)
Dept: INTERNAL MEDICINE | Facility: CLINIC | Age: 40
End: 2020-07-07
Payer: COMMERCIAL

## 2020-07-07 VITALS
DIASTOLIC BLOOD PRESSURE: 95 MMHG | WEIGHT: 186.06 LBS | HEIGHT: 72 IN | SYSTOLIC BLOOD PRESSURE: 141 MMHG | BODY MASS INDEX: 25.2 KG/M2 | HEART RATE: 75 BPM

## 2020-07-07 VITALS
WEIGHT: 186.5 LBS | HEART RATE: 85 BPM | BODY MASS INDEX: 25.26 KG/M2 | SYSTOLIC BLOOD PRESSURE: 130 MMHG | HEIGHT: 72 IN | DIASTOLIC BLOOD PRESSURE: 100 MMHG

## 2020-07-07 DIAGNOSIS — R03.0 ELEVATED BLOOD PRESSURE READING IN OFFICE WITHOUT DIAGNOSIS OF HYPERTENSION: ICD-10-CM

## 2020-07-07 DIAGNOSIS — K50.80 CROHN'S DISEASE OF BOTH SMALL AND LARGE INTESTINE WITHOUT COMPLICATION: Primary | ICD-10-CM

## 2020-07-07 DIAGNOSIS — H60.92 OTITIS EXTERNA OF LEFT EAR, UNSPECIFIED CHRONICITY, UNSPECIFIED TYPE: ICD-10-CM

## 2020-07-07 DIAGNOSIS — K62.89 RECTAL PAIN: Primary | ICD-10-CM

## 2020-07-07 DIAGNOSIS — K50.919 CROHN'S DISEASE WITH COMPLICATION, UNSPECIFIED GASTROINTESTINAL TRACT LOCATION: ICD-10-CM

## 2020-07-07 PROCEDURE — 3008F PR BODY MASS INDEX (BMI) DOCUMENTED: ICD-10-PCS | Mod: CPTII,S$GLB,, | Performed by: PHYSICIAN ASSISTANT

## 2020-07-07 PROCEDURE — 99999 PR PBB SHADOW E&M-EST. PATIENT-LVL V: CPT | Mod: PBBFAC,,, | Performed by: PHYSICIAN ASSISTANT

## 2020-07-07 PROCEDURE — 3008F PR BODY MASS INDEX (BMI) DOCUMENTED: ICD-10-PCS | Mod: CPTII,S$GLB,, | Performed by: NURSE PRACTITIONER

## 2020-07-07 PROCEDURE — 99203 OFFICE O/P NEW LOW 30 MIN: CPT | Mod: S$GLB,,, | Performed by: NURSE PRACTITIONER

## 2020-07-07 PROCEDURE — 99214 PR OFFICE/OUTPT VISIT, EST, LEVL IV, 30-39 MIN: ICD-10-PCS | Mod: S$GLB,,, | Performed by: PHYSICIAN ASSISTANT

## 2020-07-07 PROCEDURE — 99999 PR PBB SHADOW E&M-EST. PATIENT-LVL V: ICD-10-PCS | Mod: PBBFAC,,, | Performed by: PHYSICIAN ASSISTANT

## 2020-07-07 PROCEDURE — 3008F BODY MASS INDEX DOCD: CPT | Mod: CPTII,S$GLB,, | Performed by: NURSE PRACTITIONER

## 2020-07-07 PROCEDURE — 99203 PR OFFICE/OUTPT VISIT, NEW, LEVL III, 30-44 MIN: ICD-10-PCS | Mod: S$GLB,,, | Performed by: NURSE PRACTITIONER

## 2020-07-07 PROCEDURE — 99214 OFFICE O/P EST MOD 30 MIN: CPT | Mod: S$GLB,,, | Performed by: PHYSICIAN ASSISTANT

## 2020-07-07 PROCEDURE — 99999 PR PBB SHADOW E&M-EST. PATIENT-LVL III: CPT | Mod: PBBFAC,,, | Performed by: NURSE PRACTITIONER

## 2020-07-07 PROCEDURE — 99999 PR PBB SHADOW E&M-EST. PATIENT-LVL III: ICD-10-PCS | Mod: PBBFAC,,, | Performed by: NURSE PRACTITIONER

## 2020-07-07 PROCEDURE — 3008F BODY MASS INDEX DOCD: CPT | Mod: CPTII,S$GLB,, | Performed by: PHYSICIAN ASSISTANT

## 2020-07-07 RX ORDER — NEOMYCIN SULFATE, POLYMYXIN B SULFATE, HYDROCORTISONE 3.5; 10000; 1 MG/ML; [USP'U]/ML; MG/ML
3 SOLUTION/ DROPS AURICULAR (OTIC) 3 TIMES DAILY
Qty: 10 ML | Refills: 0 | Status: SHIPPED | OUTPATIENT
Start: 2020-07-07 | End: 2021-03-23

## 2020-07-07 NOTE — PROGRESS NOTES
Subjective:       Patient ID: Escobar Toro is a 40 y.o. male.        Chief Complaint: Otalgia (L)    Escobar Toro is an established patient of Liana Selby MD here today for urgent care visit.    Left ear pain - started 1-2 weeks ago, began using hydrogen peroxide and also applied crushed garlic and had improvement in pain, currently 3/10, no discharge or change in hearing    Rectal pain - started 1 month ago, h/o rectal abscess requiring surgery, feels like the abscess drains at times as he will pass mucus, pain more right than left sided but internal not external    Crohn's - no tx for several years, needs to see GI, previously on humira    Situational stress - recent divorce and also with custody dispute over 7-year-old son, very stressful, wife drained bank account so had no money for a while and was unable to come in for appointments or f/u, has been off humira, needs to get back in with GI, no SI or HI, has a therapist          Review of Systems   Constitutional: Negative for appetite change, chills, fatigue and fever.   HENT: Positive for ear pain. Negative for congestion and sore throat.    Eyes: Negative for visual disturbance.   Respiratory: Negative for cough, chest tightness and shortness of breath.    Cardiovascular: Negative for chest pain, palpitations and leg swelling.   Gastrointestinal: Positive for rectal pain. Negative for abdominal pain, blood in stool, constipation, diarrhea, nausea and vomiting.   Genitourinary: Negative for dysuria, frequency, hematuria and urgency.   Musculoskeletal: Negative for arthralgias and back pain.   Skin: Negative for rash.   Neurological: Negative for dizziness, syncope, weakness and headaches.   Psychiatric/Behavioral: Negative for dysphoric mood and sleep disturbance. The patient is not nervous/anxious.        Objective:      Physical Exam  Vitals signs and nursing note reviewed.   Constitutional:       Appearance: He is well-developed.    HENT:      Head: Normocephalic.      Right Ear: External ear normal. No swelling or tenderness. Tympanic membrane is not erythematous.      Left Ear: External ear normal. Swelling present. No tenderness. Tympanic membrane is not erythematous.      Ears:      Comments: Mild swelling left canal  Eyes:      Pupils: Pupils are equal, round, and reactive to light.   Cardiovascular:      Rate and Rhythm: Normal rate and regular rhythm.      Heart sounds: Normal heart sounds. No murmur. No friction rub. No gallop.    Pulmonary:      Effort: Pulmonary effort is normal. No respiratory distress.      Breath sounds: Normal breath sounds.   Abdominal:      Palpations: Abdomen is soft.      Tenderness: There is no abdominal tenderness.   Skin:     General: Skin is warm and dry.   Neurological:      Mental Status: He is alert.         Assessment:       1. Rectal pain    2. Otitis externa of left ear, unspecified chronicity, unspecified type    3. Crohn's disease with complication, unspecified gastrointestinal tract location    4. Elevated blood pressure reading in office without diagnosis of hypertension        Plan:       Escobar was seen today for otalgia.    Diagnoses and all orders for this visit:    Rectal pain  -     Ambulatory referral/consult to Colorectal Surgery; Future    Otitis externa of left ear, unspecified chronicity, unspecified type  -     neomycin-polymyxin-hydrocortisone (CORTISPORIN) otic solution; Place 3 drops into the left ear 3 (three) times daily.    Crohn's disease with complication, unspecified gastrointestinal tract location  -     Ambulatory referral/consult to Gastroenterology; Future    Elevated blood pressure reading in office without diagnosis of hypertension - schedule PCP f/u to recheck BP and for annual exam    To see CRS today regarding rectal pain.    Pt has been given instructions populated from [employer] database and has verbalized understanding of the after visit summary and information  "contained wherein.    Follow up with a primary care provider. May go to ER for acute shortness of breath, lightheadedness, fever, or any other emergent complaints or changes in condition.    "This note will be shared with the patient"    Future Appointments   Date Time Provider Department Center   7/7/2020  1:20 PM Lina Tompkins NP [facility]   8/4/2020  2:30 PM Jordy Gleason MD [facility]                 "

## 2020-07-07 NOTE — PROGRESS NOTES
Patient ID:  Escobar Toro is a 40 y.o. male     Chief Complaint: No chief complaint on file.       HPI:  Escobar Toro presents to colon and rectal surgery with a complaint of rectal pain for the past month. Swelling, purulent drainage then swelling again. R >L.     Crohn's  He was diagnosed with Crohn's at age 11-12 and has been on a number of different medical regimens.  He has undergone a number of drainage procedure for anorectal abscesses/fistula.   He underwent  laparoscopic ileocecectomy 1/4/17 by Dr Diaz  He was followed by GI, Dr Kerr and vielka Hay. He hasn't been on any treatment since his surgery (2017). He reports post operatively he was kicked out of his house and living out of his car and had no access to meds.     He was doing well until about a month ago. + blood in stool. weight stable. No abdominal pain.       ROS:     Review of Systems   Constitutional: Negative for appetite change, chills, fatigue, fever and unexpected weight change.   Respiratory: Negative for shortness of breath.    Cardiovascular: Negative for chest pain.   Gastrointestinal: Positive for blood in stool and rectal pain. Negative for abdominal distention, abdominal pain, anal bleeding, constipation, diarrhea, nausea and vomiting.       Review of patient's allergies indicates:   Allergen Reactions    Remicade [infliximab]      Past Medical History:   Diagnosis Date    Allergy     seasonal    Chronic diarrhea     Crohn disease     DDD (degenerative disc disease), cervical     Joint pain     Keloid cicatrix     Ulcer     Ulcerative colitis      Past Surgical History:   Procedure Laterality Date    ABCESS DRAINAGE      COLONOSCOPY N/A 12/5/2016    Procedure: COLONOSCOPY;  Surgeon: Steven Kerr MD;  Location: [address];  Service: Endoscopy;  Laterality: N/A;     Family History   Problem Relation Age of Onset    Arthritis Father     Cancer Maternal Grandmother     Melanoma Neg Hx      Psoriasis Neg Hx     Lupus Neg Hx     Eczema Neg Hx     Acne Neg Hx     Colon cancer Neg Hx     Rectal cancer Neg Hx     Stomach cancer Neg Hx     Crohn's disease Neg Hx     Esophageal cancer Neg Hx     Irritable bowel syndrome Neg Hx     Ulcerative colitis Neg Hx     Celiac disease Neg Hx      Current Outpatient Medications on File Prior to Visit   Medication Sig    neomycin-polymyxin-hydrocortisone (CORTISPORIN) otic solution Place 3 drops into the left ear 3 (three) times daily.     No current facility-administered medications on file prior to visit.        PE:  Vitals:    07/07/20 1311   BP: (!) 141/95   Pulse: 75     Physical Exam   Constitutional: He is oriented to person, place, and time and well-developed, well-nourished, and in no distress. No distress.   HENT:   Head: Normocephalic and atraumatic.   Pulmonary/Chest: Effort normal. No respiratory distress.   Abdominal: Soft. He exhibits no distension. There is no abdominal tenderness.   Genitourinary:    Genitourinary Comments: Normal perianal skin. eversion of anus revealed no abnormality or fissure, CHRISTY revealed scarring and induration right lateral    Right lateral area of induration and purulent drainage.        Musculoskeletal: Normal range of motion.   Neurological: He is alert and oriented to person, place, and time. GCS score is 15.   Skin: Skin is warm and dry. No rash noted. No erythema.   Psychiatric: Affect normal.       Impression:  Encounter Diagnosis   Name Primary?    Crohn's disease of both small and large intestine without complication Yes       PLAN:  Diagnoses and all orders for this visit:    Crohn's disease of both small and large intestine without complication        Appears to have perianal fistula  Will refer back to Dr Diaz since he is familiar with him. Will roxann need EUA/repeat csocpe?  Referred back to Dr Kerr for medical management of crohns

## 2020-07-07 NOTE — LETTER
July 7, 2020      Sandrine Parikh PA-C  [address]  [facility] [address]           [facility]  [address]  [facility] [address]  Phone: [phone]          Patient: Escobar Toro   MR Number: [id number]   YOB: 1980   Date of Visit: 7/7/2020       Dear Sandrine Parikh:    Thank you for referring Escobar Toro to me for evaluation. Attached you will find relevant portions of my assessment and plan of care.    If you have questions, please do not hesitate to call me. I look forward to following Escobar Toro along with you.    Sincerely,    Lina Tompkins, DEWEY    Enclosure  CC:  No Recipients    If you would like to receive this communication electronically, please contact [email or web address] or [phone] to request more information on [employer] Link access.    For providers and/or their staff who would like to refer a patient to [facility], please contact us through our one-stop-shop provider referral line, [facility] , at [phone].    If you feel you have received this communication in error or would no longer like to receive these types of communications, please e-mail [email or web address]

## 2020-07-07 NOTE — PATIENT INSTRUCTIONS
Schedule an appointment with your PCP   External Ear Infection (Adult)    External otitis (also called swimmers ear) is an infection in the ear canal. It is often caused by bacteria or fungus. It can occur a few days after water gets trapped in the ear canal (from swimming or bathing). It can also occur after cleaning too deeply in the ear canal with a cotton swab or other object. Sometimes, hair care products get into the ear canal and cause this problem.  Symptoms can include pain, fever, itching, redness, drainage, or swelling of the ear canal. Temporary hearing loss may also occur.  Home care  · Do not try to clean the ear canal. This can push pus and bacteria deeper into the canal.  · Use prescribed ear drops as directed. These help reduce swelling and fight the infection. If an ear wick was placed in the ear canal, apply drops right onto the end of the wick. The wick will draw the medication into the ear canal even if it is swollen closed.  · A cotton ball may be loosely placed in the outer ear to absorb any drainage.  · You may use acetaminophen or ibuprofen to control pain, unless another medication was prescribed. Note: If you have chronic liver or kidney disease or ever had a stomach ulcer or GI bleeding, talk to your health care provider before taking any of these medications.  · Do not allow water to get into your ear when bathing. Also, avoid swimming until the infection has cleared.  Prevention  · Keep your ears dry. This helps lower the risk of infection. Dry your ears with a towel or hair dryer after getting wet. Also, use ear plugs when swimming.  · Do not stick any objects in the ear to remove wax.  · If you feel water trapped in your ear, use ear drops right away. You can get these drops over the counter at most drugstores. They work by removing water from the ear canal.  Follow-up care  Follow up with your health care provider in one week, or as advised.  When to seek medical advice  Call  your health care provider right away if any of these occur:  · Ear pain becomes worse or doesnt improve after 3 days of treatment  · Redness or swelling of the outer ear occurs or gets worse  · Headache  · Painful or stiff neck  · Drowsiness or confusion  · Fever of 100.4ºF (38ºC) or higher, or as directed by your health care provider  · Seizure  Date Last Reviewed: 3/22/2015  © 0136-3617 [employer]. [address] [place] [address], [place] [id number]. All rights reserved. This information is not intended as a substitute for professional medical care. Always follow your healthcare professional's instructions.

## 2020-07-13 ENCOUNTER — OFFICE VISIT (OUTPATIENT)
Dept: SURGERY | Facility: CLINIC | Age: 40
End: 2020-07-13
Payer: COMMERCIAL

## 2020-07-13 ENCOUNTER — LAB VISIT (OUTPATIENT)
Dept: LAB | Facility: HOSPITAL | Age: 40
End: 2020-07-13
Attending: COLON & RECTAL SURGERY
Payer: COMMERCIAL

## 2020-07-13 ENCOUNTER — TELEPHONE (OUTPATIENT)
Dept: SURGERY | Facility: CLINIC | Age: 40
End: 2020-07-13

## 2020-07-13 VITALS
HEIGHT: 72 IN | WEIGHT: 182.56 LBS | BODY MASS INDEX: 24.73 KG/M2 | HEART RATE: 76 BPM | SYSTOLIC BLOOD PRESSURE: 132 MMHG | DIASTOLIC BLOOD PRESSURE: 84 MMHG

## 2020-07-13 DIAGNOSIS — K50.813 CROHN'S DISEASE OF BOTH SMALL AND LARGE INTESTINE WITH FISTULA: ICD-10-CM

## 2020-07-13 DIAGNOSIS — K60.3 ANAL FISTULA: ICD-10-CM

## 2020-07-13 DIAGNOSIS — Z01.818 PRE-OP TESTING: ICD-10-CM

## 2020-07-13 DIAGNOSIS — K60.3 ANAL FISTULA: Primary | ICD-10-CM

## 2020-07-13 LAB
ALBUMIN SERPL BCP-MCNC: 4 G/DL (ref 3.5–5.2)
ALP SERPL-CCNC: 110 U/L (ref 55–135)
ALT SERPL W/O P-5'-P-CCNC: 23 U/L (ref 10–44)
ANION GAP SERPL CALC-SCNC: 8 MMOL/L (ref 8–16)
AST SERPL-CCNC: 18 U/L (ref 10–40)
BASOPHILS # BLD AUTO: 0.04 K/UL (ref 0–0.2)
BASOPHILS NFR BLD: 0.7 % (ref 0–1.9)
BILIRUB SERPL-MCNC: 1.6 MG/DL (ref 0.1–1)
BUN SERPL-MCNC: 17 MG/DL (ref 6–20)
CALCIUM SERPL-MCNC: 9.4 MG/DL (ref 8.7–10.5)
CHLORIDE SERPL-SCNC: 106 MMOL/L (ref 95–110)
CO2 SERPL-SCNC: 27 MMOL/L (ref 23–29)
CREAT SERPL-MCNC: 1.2 MG/DL (ref 0.5–1.4)
CRP SERPL-MCNC: 2.4 MG/L (ref 0–8.2)
DIFFERENTIAL METHOD: ABNORMAL
EOSINOPHIL # BLD AUTO: 0.3 K/UL (ref 0–0.5)
EOSINOPHIL NFR BLD: 4.3 % (ref 0–8)
ERYTHROCYTE [DISTWIDTH] IN BLOOD BY AUTOMATED COUNT: 12.4 % (ref 11.5–14.5)
EST. GFR  (AFRICAN AMERICAN): >60 ML/MIN/1.73 M^2
EST. GFR  (NON AFRICAN AMERICAN): >60 ML/MIN/1.73 M^2
GLUCOSE SERPL-MCNC: 84 MG/DL (ref 70–110)
HCT VFR BLD AUTO: 40.3 % (ref 40–54)
HGB BLD-MCNC: 13.2 G/DL (ref 14–18)
IMM GRANULOCYTES # BLD AUTO: 0.02 K/UL (ref 0–0.04)
IMM GRANULOCYTES NFR BLD AUTO: 0.3 % (ref 0–0.5)
LYMPHOCYTES # BLD AUTO: 1.2 K/UL (ref 1–4.8)
LYMPHOCYTES NFR BLD: 20.7 % (ref 18–48)
MCH RBC QN AUTO: 29.7 PG (ref 27–31)
MCHC RBC AUTO-ENTMCNC: 32.8 G/DL (ref 32–36)
MCV RBC AUTO: 91 FL (ref 82–98)
MONOCYTES # BLD AUTO: 0.6 K/UL (ref 0.3–1)
MONOCYTES NFR BLD: 10.4 % (ref 4–15)
NEUTROPHILS # BLD AUTO: 3.7 K/UL (ref 1.8–7.7)
NEUTROPHILS NFR BLD: 63.6 % (ref 38–73)
NRBC BLD-RTO: 0 /100 WBC
PLATELET # BLD AUTO: 241 K/UL (ref 150–350)
PMV BLD AUTO: 10.6 FL (ref 9.2–12.9)
POTASSIUM SERPL-SCNC: 4.1 MMOL/L (ref 3.5–5.1)
PROT SERPL-MCNC: 8.4 G/DL (ref 6–8.4)
RBC # BLD AUTO: 4.45 M/UL (ref 4.6–6.2)
SODIUM SERPL-SCNC: 141 MMOL/L (ref 136–145)
WBC # BLD AUTO: 5.76 K/UL (ref 3.9–12.7)

## 2020-07-13 PROCEDURE — 85025 COMPLETE CBC W/AUTO DIFF WBC: CPT

## 2020-07-13 PROCEDURE — 99213 PR OFFICE/OUTPT VISIT, EST, LEVL III, 20-29 MIN: ICD-10-PCS | Mod: S$GLB,,, | Performed by: COLON & RECTAL SURGERY

## 2020-07-13 PROCEDURE — 80053 COMPREHEN METABOLIC PANEL: CPT

## 2020-07-13 PROCEDURE — 3008F BODY MASS INDEX DOCD: CPT | Mod: CPTII,S$GLB,, | Performed by: COLON & RECTAL SURGERY

## 2020-07-13 PROCEDURE — 3008F PR BODY MASS INDEX (BMI) DOCUMENTED: ICD-10-PCS | Mod: CPTII,S$GLB,, | Performed by: COLON & RECTAL SURGERY

## 2020-07-13 PROCEDURE — 36415 COLL VENOUS BLD VENIPUNCTURE: CPT

## 2020-07-13 PROCEDURE — 99999 PR PBB SHADOW E&M-EST. PATIENT-LVL IV: ICD-10-PCS | Mod: PBBFAC,,, | Performed by: COLON & RECTAL SURGERY

## 2020-07-13 PROCEDURE — 99213 OFFICE O/P EST LOW 20 MIN: CPT | Mod: S$GLB,,, | Performed by: COLON & RECTAL SURGERY

## 2020-07-13 PROCEDURE — 86140 C-REACTIVE PROTEIN: CPT

## 2020-07-13 PROCEDURE — 99999 PR PBB SHADOW E&M-EST. PATIENT-LVL IV: CPT | Mod: PBBFAC,,, | Performed by: COLON & RECTAL SURGERY

## 2020-07-13 NOTE — LETTER
July 17, 2020      Steven Kerr MD  [address]  [facility] [address]           [facility]  [address]  [facility] [address]  Phone: [phone]          Patient: Escobar Toro   MR Number: [id number]   YOB: 1980   Date of Visit: 7/13/2020       Dear Dr Kerr:    Thank you for referring Escobar Toro to me for evaluation. Attached you will find relevant portions of my assessment and plan of care.    If you have questions, please do not hesitate to call me. I look forward to following Escobar Toro along with you.    Sincerely,    Pete Diaz MD    Enclosure  CC:  Liana Selby MD    If you would like to receive this communication electronically, please contact [email or web address] or [phone] to request more information on [employer] Link access.    For providers and/or their staff who would like to refer a patient to [facility], please contact us through our one-stop-shop provider referral line, [facility], at [phone].    If you feel you have received this communication in error or would no longer like to receive these types of communications, please e-mail [email or web address]

## 2020-07-14 ENCOUNTER — LAB VISIT (OUTPATIENT)
Dept: INTERNAL MEDICINE | Facility: CLINIC | Age: 40
End: 2020-07-14
Payer: COMMERCIAL

## 2020-07-14 ENCOUNTER — TELEPHONE (OUTPATIENT)
Dept: SURGERY | Facility: CLINIC | Age: 40
End: 2020-07-14

## 2020-07-14 DIAGNOSIS — Z01.818 PRE-OP TESTING: ICD-10-CM

## 2020-07-14 PROCEDURE — U0003 INFECTIOUS AGENT DETECTION BY NUCLEIC ACID (DNA OR RNA); SEVERE ACUTE RESPIRATORY SYNDROME CORONAVIRUS 2 (SARS-COV-2) (CORONAVIRUS DISEASE [COVID-19]), AMPLIFIED PROBE TECHNIQUE, MAKING USE OF HIGH THROUGHPUT TECHNOLOGIES AS DESCRIBED BY CMS-2020-01-R: HCPCS

## 2020-07-14 NOTE — TELEPHONE ENCOUNTER
----- Message from Mila Johnson sent at 7/14/2020 10:47 AM CDT -----  He has questions regarding covid19 test   Pt#[phone]

## 2020-07-14 NOTE — TELEPHONE ENCOUNTER
Spoke with patient.  Patient trying to get Covid testing done.  Scheduled him to come to Internal medicine across from the hospital.

## 2020-07-15 LAB — SARS-COV-2 RNA RESP QL NAA+PROBE: NOT DETECTED

## 2020-07-16 ENCOUNTER — ANESTHESIA EVENT (OUTPATIENT)
Dept: SURGERY | Facility: HOSPITAL | Age: 40
End: 2020-07-16
Payer: COMMERCIAL

## 2020-07-16 ENCOUNTER — OFFICE VISIT (OUTPATIENT)
Dept: GASTROENTEROLOGY | Facility: CLINIC | Age: 40
End: 2020-07-16
Payer: COMMERCIAL

## 2020-07-16 ENCOUNTER — TELEPHONE (OUTPATIENT)
Dept: ENDOSCOPY | Facility: HOSPITAL | Age: 40
End: 2020-07-16

## 2020-07-16 ENCOUNTER — TELEPHONE (OUTPATIENT)
Dept: SURGERY | Facility: CLINIC | Age: 40
End: 2020-07-16

## 2020-07-16 VITALS
WEIGHT: 184.31 LBS | HEART RATE: 92 BPM | SYSTOLIC BLOOD PRESSURE: 132 MMHG | BODY MASS INDEX: 24.96 KG/M2 | DIASTOLIC BLOOD PRESSURE: 87 MMHG | HEIGHT: 72 IN

## 2020-07-16 DIAGNOSIS — Z01.812 PRE-PROCEDURE LAB EXAM: ICD-10-CM

## 2020-07-16 DIAGNOSIS — K50.819 CROHN'S DISEASE OF SMALL AND LARGE INTESTINES WITH COMPLICATION: Primary | ICD-10-CM

## 2020-07-16 DIAGNOSIS — Z12.11 SPECIAL SCREENING FOR MALIGNANT NEOPLASMS, COLON: Primary | ICD-10-CM

## 2020-07-16 PROCEDURE — 99999 PR PBB SHADOW E&M-EST. PATIENT-LVL III: CPT | Mod: PBBFAC,,, | Performed by: INTERNAL MEDICINE

## 2020-07-16 PROCEDURE — 99214 PR OFFICE/OUTPT VISIT, EST, LEVL IV, 30-39 MIN: ICD-10-PCS | Mod: S$GLB,,, | Performed by: INTERNAL MEDICINE

## 2020-07-16 PROCEDURE — 3008F PR BODY MASS INDEX (BMI) DOCUMENTED: ICD-10-PCS | Mod: CPTII,S$GLB,, | Performed by: INTERNAL MEDICINE

## 2020-07-16 PROCEDURE — 3008F BODY MASS INDEX DOCD: CPT | Mod: CPTII,S$GLB,, | Performed by: INTERNAL MEDICINE

## 2020-07-16 PROCEDURE — 99999 PR PBB SHADOW E&M-EST. PATIENT-LVL III: ICD-10-PCS | Mod: PBBFAC,,, | Performed by: INTERNAL MEDICINE

## 2020-07-16 PROCEDURE — 99214 OFFICE O/P EST MOD 30 MIN: CPT | Mod: S$GLB,,, | Performed by: INTERNAL MEDICINE

## 2020-07-16 RX ORDER — POLYETHYLENE GLYCOL 3350, SODIUM SULFATE ANHYDROUS, SODIUM BICARBONATE, SODIUM CHLORIDE, POTASSIUM CHLORIDE 236; 22.74; 6.74; 5.86; 2.97 G/4L; G/4L; G/4L; G/4L; G/4L
4 POWDER, FOR SOLUTION ORAL ONCE
Qty: 4000 ML | Refills: 0 | Status: SHIPPED | OUTPATIENT
Start: 2020-07-16 | End: 2020-07-16

## 2020-07-16 NOTE — PROGRESS NOTES
CC: follow up CD     HPI:  Escobar Toro is a 37 y.o. male with Crohn's disease of small bowel and colon, complicated by perianal abscesses (has resolved), s/p ileocolectomy in 1/2017, here for routine follow up.  Mr. Toro has a hx of Barry's dx at age 18, but has has s/s since early teens (as early as 12 year old). He took 40 mg Humira  injections every two weeks for maintenance since 2009, but has been off since Dec 2012 d/t insurance coverage issues.   He had hx of stricture of the TI with ileocolectomy in 1/2017 .  Surgeon was able to get back on Humira but eventually had to discontinue due to financial and family reasons.  He has previously failed imuran, methotrexate, and remicade therapy.     He was doing well until about a month ago. + blood in stool. weight stable.  He has intermittent diffuse abdominal pains several times a year.  His bowels move regularly but also has loose bowel movements from time to time.  Occasional blood in the stool.  Genitourinary exam done at the colorectal surgery clinic revealed normal perianal skin with CHRISTY revealing scarring and induration in the right lateral with purulent drainage.     Since our last visit he has been , and is going through a custody vogt for his son.        Past Medical History: has a past medical history of Allergy; Chronic diarrhea; Crohn disease; DDD (degenerative disc disease), cervical; Joint pain; Keloid cicatrix; Ulcer; and Ulcerative colitis.     Past Surgical History: has a past surgical history that includes Abscess drainage and Colonoscopy (N/A, 12/5/2016).     Family History:family history includes Arthritis in his father; Cancer in his maternal grandmother.      Social History: reports that he has never smoked. He has never used smokeless tobacco. He reports that he does not drink alcohol or use drugs.      Medication and allergies reviewed     Review of Systems:  Constitutional: Negative for chills, fever and weight  loss.   HENT: Negative for sore throat.    Eyes: Blurred vision  in the left eye, no double vision.   Respiratory: Negative for cough, hemoptysis and stridor.    Cardiovascular: Negative for chest pain and palpitations.   Gastrointestinal:  See HPI.  Genitourinary: Negative for dysuria and flank pain.   Musculoskeletal: Negative for joint pain.   Skin:  He gets pruritic skin rash on his upper extremities and chest from time to time  Neurological: Negative for dizziness and seizures.   Endo/Heme/Allergies: Negative for polydipsia.   Psychiatric/Behavioral: Negative for depression and suicidal ideas.         Physical Exam: Vital see Epic, awake, alert, oriented x3 in no acute distress  Head: Normocephalic and atraumatic.   Eyes: No scleral icterus.   Neck: Neck supple.   Cardiovascular: Normal rate.  Exam reveals no gallop and no friction rub.    Pulmonary/Chest: Effort normal and breath sounds normal.   Abdominal: Soft. Bowel sounds are normal. He exhibits no distension and no mass. There is  mild abdominal tenderness to deep palpation supraumbilical and also to the left of the umbilicus. There is no rebound and no guarding. No hernia.   Abdominal scar   Musculoskeletal: He exhibits no edema or tenderness.   Neurological: He is alert and oriented to person, place, and time.   Skin: Skin is warm.   Psychiatric: He has a normal mood and affect.              Impression: Escobar Toro is a 40 y.o. male with Crohn's disease of small bowel and colon, complicated by history of perianal abscesses and s/p ileocolectomy in 1/2017. Resumed Humira since surgery, however had to discontinue few months thereafter.  Currently not on any treatment.  Having perianal fistulas drain.  He is scheduled for exam under anesthesia with possible seton placement tomorrow.  He will follow back in clinic after he undergoes a CT enterography and endoscopy and colonoscopy for further evaluation.    Recommendation:    1. Schedule CT  enterography  2. Schedule EGD and colonoscopy after exam under anesthesia by Dr. Diaz

## 2020-07-16 NOTE — PRE-PROCEDURE INSTRUCTIONS
PREOP INSTRUCTIONS:No solid food ,milk or milk products for 8 hours prior to procedure.Clear liquids are allowed up to 2 hours before procedure.Clear liquids are:water,apple juice,gatorade & powerade.Patient instructed to follow his surgeon's instructions if they differ from these.Shower instructions as well as directions to the Surgery Center were given.Patient encouraged to wear loose fitting,comfortable clothing.Medication instructions for pm prior to and am of procedure reviewed.Instructed patient to avoid taking vitamins,supplements,aspirin and ibuprofen the morning of surgery. Patient stated an understanding.Patient instructed to take temperature the night before surgery as well as the morning of surgery and to notify [facility] at [phone] if it is 100.4 or above.Patient also informed of the current visitor policy and advised patient that one visitor may accompany them into the hospital and wait (socially distanced) until a member of the medical team provides an update at the conclusion of the procedure.When they enter the hospital both patient and visitor will have their temperature checked.All visitors are asked to arrive with a mask and to keep their mask on throughout the visit.    Covid test completed 7/14/2020-Negative    Patient denies any side effects or issues with anesthesia or sedation.

## 2020-07-16 NOTE — ANESTHESIA PREPROCEDURE EVALUATION
[facility]  Anesthesia Pre-Operative Evaluation         Patient Name: Escobar Toro  YOB: 1980  MRN: [id number]    SUBJECTIVE:     Pre-operative evaluation for Procedure(s) (LRB):  PLACEMENT-SETON DRAIN (N/A)     07/16/2020    Escobar Toro is a 40 y.o. male w/ a significant PMHx of Crohn's disease of small bowel and colon, complicated by perianal abscesses (has resolved), s/p ileocolectomy in 1/2017 who presents for the above procedure. Now with perianal fistula.      LDA: None documented.    Prev airway: Present Prior to Hospital Arrival?: No; Placement Date: 01/04/17; Placement Time: 1016; Method of Intubation: Direct laryngoscopy; Inserted by: Anesthesia Resident; Airway Device: Endotracheal Tube; Mask Ventilation: Easy; Intubated: Postinduction; Blade: Valeriy #3; Airway Device Size: 8.0; Style: Cuffed; Placement Verified By: Auscultation, Capnometry, ETT Condensation; Grade: Grade I; Complicating Factors: None; Intubation Findings: Positive EtCO2, Bilateral breath sounds, Atraumatic/Condition of teeth unchanged;  Depth of Insertion: 23; Securment: Lips; Complications: None; Breath Sounds: Equal Bilateral; Insertion Attempts: 1; Removal Date: 01/04/17;  Removal Time: 1258    Drips: None documented.    Patient Active Problem List   Diagnosis    Crohn's disease of both small and large intestine without complication    DDD (degenerative disc disease), cervical    Acute Crohn's disease    Crohn's colitis    Non-intractable vomiting with nausea    Diarrhea of presumed infectious origin    Volume depletion, gastrointestinal loss    Lower abdominal pain    Rectal pain    Viral URI with cough    Wheezing       Review of patient's allergies indicates:   Allergen Reactions    Remicade [infliximab] Swelling     Throat swelled       Current Inpatient Medications:      No current facility-administered medications on file prior to encounter.      Current  Outpatient Medications on File Prior to Encounter   Medication Sig Dispense Refill    neomycin-polymyxin-hydrocortisone (CORTISPORIN) otic solution Place 3 drops into the left ear 3 (three) times daily. 10 mL 0       Past Surgical History:   Procedure Laterality Date    ABCESS DRAINAGE      COLONOSCOPY N/A 12/5/2016    Procedure: COLONOSCOPY;  Surgeon: Steven Kerr MD;  Location: [address];  Service: Endoscopy;  Laterality: N/A;       Social History     Socioeconomic History    Marital status:      Spouse name: Not on file    Number of children: 1    Years of education: Not on file    Highest education level: Not on file   Occupational History    Occupation: construction      Employer: [employer]   Social Needs    Financial resource strain: Not on file    Food insecurity     Worry: Not on file     Inability: Not on file    Transportation needs     Medical: Not on file     Non-medical: Not on file   Tobacco Use    Smoking status: Never Smoker    Smokeless tobacco: Never Used   Substance and Sexual Activity    Alcohol use: No    Drug use: No    Sexual activity: Yes   Lifestyle    Physical activity     Days per week: Not on file     Minutes per session: Not on file    Stress: Not on file   Relationships    Social connections     Talks on phone: Not on file     Gets together: Not on file     Attends Nondenominational service: Not on file     Active member of club or organization: Not on file     Attends meetings of clubs or organizations: Not on file     Relationship status: Not on file   Other Topics Concern    Not on file   Social History Narrative    Not on file       OBJECTIVE:     Vital Signs Range (Last 24H):  Pulse:  [92]   BP: (132)/(87)       CBC:   No results for input(s): WBC, RBC, HGB, HCT, PLT, MCV, MCH, MCHC in the last 72 hours.    CMP: No results for input(s): NA, K, CL, CO2, BUN, CREATININE, GLU, MG, PHOS, CALCIUM, ALBUMIN, PROT, ALKPHOS, ALT, AST, BILITOT in the  last 72 hours.    INR:  No results for input(s): PT, INR, PROTIME, APTT in the last 72 hours.    Diagnostic Studies: No relevant studies.    EKG: No recent studies available.    2D ECHO:  No results found for this or any previous visit.      ASSESSMENT/PLAN:         Anesthesia Evaluation    I have reviewed the Patient Summary Reports.      I have reviewed the Medications.     Review of Systems  Anesthesia Hx:  No problems with previous Anesthesia Denies Hx of Anesthetic complications  History of prior surgery of interest to airway management or planning:  Denies Personal Hx of Anesthesia complications.   Hematology/Oncology:  Hematology Normal   Oncology Normal     Cardiovascular:  Cardiovascular Normal     Pulmonary:  Pulmonary Normal    Hepatic/GI:   PUD: Crohn's Disease.    Musculoskeletal:   Denies Arthritis.   Denies Spine Disorders    Neurological:  Neurology Normal    Endocrine:  Endocrine Normal        Physical Exam  General:  Well nourished    Airway/Jaw/Neck:  Airway Findings: Mouth Opening: Normal Tongue: Normal  General Airway Assessment: Adult  Mallampati: I  TM Distance: Normal, at least 6 cm  Jaw/Neck Findings:  Neck ROM: Normal ROM      Dental:  Dental Findings: In tact   Chest/Lungs:  Chest/Lungs Findings: Clear to auscultation     Heart/Vascular:  Heart Findings: Rate: Normal  Heart murmur: negative       Mental Status:  Mental Status Findings:  Cooperative, Alert and Oriented         Anesthesia Plan  Type of Anesthesia, risks & benefits discussed:  Anesthesia Type:  general  Patient's Preference:   Intra-op Monitoring Plan: standard ASA monitors  Intra-op Monitoring Plan Comments:   Post Op Pain Control Plan: per primary service following discharge from PACU, IV/PO Opioids PRN and multimodal analgesia  Post Op Pain Control Plan Comments:   Induction:   IV  Beta Blocker:  Patient is not currently on a Beta-Blocker (No further documentation required).       Informed Consent: Patient understands  risks and agrees with Anesthesia plan.  Questions answered. Anesthesia consent signed with patient.  ASA Score: 2     Day of Surgery Review of History & Physical:    H&P update referred to the surgeon.         Ready For Surgery From Anesthesia Perspective.

## 2020-07-17 ENCOUNTER — HOSPITAL ENCOUNTER (OUTPATIENT)
Facility: HOSPITAL | Age: 40
Discharge: HOME | End: 2020-07-17
Attending: COLON & RECTAL SURGERY | Admitting: COLON & RECTAL SURGERY
Payer: COMMERCIAL

## 2020-07-17 ENCOUNTER — ANESTHESIA (OUTPATIENT)
Dept: SURGERY | Facility: HOSPITAL | Age: 40
End: 2020-07-17
Payer: COMMERCIAL

## 2020-07-17 VITALS
HEIGHT: 72 IN | SYSTOLIC BLOOD PRESSURE: 130 MMHG | WEIGHT: 185 LBS | DIASTOLIC BLOOD PRESSURE: 93 MMHG | HEART RATE: 69 BPM | OXYGEN SATURATION: 97 % | RESPIRATION RATE: 18 BRPM | BODY MASS INDEX: 25.06 KG/M2 | TEMPERATURE: 98 F

## 2020-07-17 DIAGNOSIS — K60.3 ANAL FISTULA: Primary | ICD-10-CM

## 2020-07-17 DIAGNOSIS — K50.80 CROHN'S DISEASE OF BOTH SMALL AND LARGE INTESTINE WITHOUT COMPLICATION: ICD-10-CM

## 2020-07-17 PROBLEM — K60.30 ANAL FISTULA: Status: ACTIVE | Noted: 2020-07-17

## 2020-07-17 PROCEDURE — 71000015 HC POSTOP RECOV 1ST HR: Performed by: COLON & RECTAL SURGERY

## 2020-07-17 PROCEDURE — C9290 INJ, BUPIVACAINE LIPOSOME: HCPCS | Performed by: COLON & RECTAL SURGERY

## 2020-07-17 PROCEDURE — D9220A PRA ANESTHESIA: ICD-10-PCS | Mod: ,,, | Performed by: ANESTHESIOLOGY

## 2020-07-17 PROCEDURE — 36000707: Performed by: COLON & RECTAL SURGERY

## 2020-07-17 PROCEDURE — 37000008 HC ANESTHESIA 1ST 15 MINUTES: Performed by: COLON & RECTAL SURGERY

## 2020-07-17 PROCEDURE — 63600175 PHARM REV CODE 636 W HCPCS: Performed by: COLON & RECTAL SURGERY

## 2020-07-17 PROCEDURE — D9220A PRA ANESTHESIA: Mod: ,,, | Performed by: ANESTHESIOLOGY

## 2020-07-17 PROCEDURE — 63600175 PHARM REV CODE 636 W HCPCS: Performed by: STUDENT IN AN ORGANIZED HEALTH CARE EDUCATION/TRAINING PROGRAM

## 2020-07-17 PROCEDURE — 37000009 HC ANESTHESIA EA ADD 15 MINS: Performed by: COLON & RECTAL SURGERY

## 2020-07-17 PROCEDURE — 46270 REMOVE ANAL FIST SUBQ: CPT | Mod: ,,, | Performed by: COLON & RECTAL SURGERY

## 2020-07-17 PROCEDURE — 46270 PR REMOVAL ANAL FISTULA,SUBCUTANEOUS: ICD-10-PCS | Mod: ,,, | Performed by: COLON & RECTAL SURGERY

## 2020-07-17 PROCEDURE — 25000003 PHARM REV CODE 250: Performed by: COLON & RECTAL SURGERY

## 2020-07-17 PROCEDURE — 36000706: Performed by: COLON & RECTAL SURGERY

## 2020-07-17 PROCEDURE — 25000003 PHARM REV CODE 250: Performed by: STUDENT IN AN ORGANIZED HEALTH CARE EDUCATION/TRAINING PROGRAM

## 2020-07-17 PROCEDURE — 71000044 HC DOSC ROUTINE RECOVERY FIRST HOUR: Performed by: COLON & RECTAL SURGERY

## 2020-07-17 RX ORDER — ONDANSETRON 2 MG/ML
4 INJECTION INTRAMUSCULAR; INTRAVENOUS
Status: DISCONTINUED | OUTPATIENT
Start: 2020-07-17 | End: 2020-07-17 | Stop reason: HOSPADM

## 2020-07-17 RX ORDER — MIDAZOLAM HYDROCHLORIDE 1 MG/ML
INJECTION, SOLUTION INTRAMUSCULAR; INTRAVENOUS
Status: DISCONTINUED | OUTPATIENT
Start: 2020-07-17 | End: 2020-07-17

## 2020-07-17 RX ORDER — ONDANSETRON 2 MG/ML
INJECTION INTRAMUSCULAR; INTRAVENOUS
Status: DISCONTINUED | OUTPATIENT
Start: 2020-07-17 | End: 2020-07-17

## 2020-07-17 RX ORDER — SODIUM CHLORIDE 9 MG/ML
INJECTION, SOLUTION INTRAVENOUS CONTINUOUS
Status: ACTIVE | OUTPATIENT
Start: 2020-07-17

## 2020-07-17 RX ORDER — MAGNESIUM HYDROXIDE 1200 MG/15ML
1 LIQUID ORAL ONCE
COMMUNITY
End: 2021-03-12

## 2020-07-17 RX ORDER — HYDROMORPHONE HYDROCHLORIDE 1 MG/ML
0.2 INJECTION, SOLUTION INTRAMUSCULAR; INTRAVENOUS; SUBCUTANEOUS EVERY 5 MIN PRN
Status: DISCONTINUED | OUTPATIENT
Start: 2020-07-17 | End: 2020-07-17 | Stop reason: HOSPADM

## 2020-07-17 RX ORDER — LIDOCAINE HYDROCHLORIDE 20 MG/ML
INJECTION INTRAVENOUS
Status: DISCONTINUED | OUTPATIENT
Start: 2020-07-17 | End: 2020-07-17

## 2020-07-17 RX ORDER — SUCCINYLCHOLINE CHLORIDE 20 MG/ML
INJECTION INTRAMUSCULAR; INTRAVENOUS
Status: DISCONTINUED | OUTPATIENT
Start: 2020-07-17 | End: 2020-07-17

## 2020-07-17 RX ORDER — FENTANYL CITRATE 50 UG/ML
INJECTION, SOLUTION INTRAMUSCULAR; INTRAVENOUS
Status: DISCONTINUED | OUTPATIENT
Start: 2020-07-17 | End: 2020-07-17

## 2020-07-17 RX ORDER — ROCURONIUM BROMIDE 10 MG/ML
INJECTION, SOLUTION INTRAVENOUS
Status: DISCONTINUED | OUTPATIENT
Start: 2020-07-17 | End: 2020-07-17

## 2020-07-17 RX ORDER — SODIUM CHLORIDE 9 MG/ML
INJECTION, SOLUTION INTRAVENOUS CONTINUOUS PRN
Status: DISCONTINUED | OUTPATIENT
Start: 2020-07-17 | End: 2020-07-17

## 2020-07-17 RX ORDER — BUPIVACAINE HYDROCHLORIDE 2.5 MG/ML
INJECTION, SOLUTION EPIDURAL; INFILTRATION; INTRACAUDAL
Status: DISCONTINUED | OUTPATIENT
Start: 2020-07-17 | End: 2020-07-17 | Stop reason: HOSPADM

## 2020-07-17 RX ORDER — ACETAMINOPHEN 500 MG
1000 TABLET ORAL ONCE
Status: COMPLETED | OUTPATIENT
Start: 2020-07-17 | End: 2020-07-17

## 2020-07-17 RX ORDER — MUPIROCIN 20 MG/G
OINTMENT TOPICAL
Status: DISPENSED | OUTPATIENT
Start: 2020-07-17

## 2020-07-17 RX ORDER — DEXAMETHASONE SODIUM PHOSPHATE 4 MG/ML
INJECTION, SOLUTION INTRA-ARTICULAR; INTRALESIONAL; INTRAMUSCULAR; INTRAVENOUS; SOFT TISSUE
Status: DISCONTINUED | OUTPATIENT
Start: 2020-07-17 | End: 2020-07-17

## 2020-07-17 RX ORDER — SODIUM CHLORIDE 0.9 % (FLUSH) 0.9 %
10 SYRINGE (ML) INJECTION
Status: DISCONTINUED | OUTPATIENT
Start: 2020-07-17 | End: 2020-07-17 | Stop reason: HOSPADM

## 2020-07-17 RX ORDER — OXYCODONE HYDROCHLORIDE 5 MG/1
5 TABLET ORAL EVERY 4 HOURS PRN
Qty: 30 TABLET | Refills: 0 | Status: SHIPPED | OUTPATIENT
Start: 2020-07-17 | End: 2020-07-17 | Stop reason: SDUPTHER

## 2020-07-17 RX ORDER — KETAMINE HCL IN 0.9 % NACL 50 MG/5 ML
SYRINGE (ML) INTRAVENOUS
Status: DISCONTINUED | OUTPATIENT
Start: 2020-07-17 | End: 2020-07-17

## 2020-07-17 RX ORDER — FENTANYL CITRATE 50 UG/ML
25 INJECTION, SOLUTION INTRAMUSCULAR; INTRAVENOUS EVERY 5 MIN PRN
Status: DISCONTINUED | OUTPATIENT
Start: 2020-07-17 | End: 2020-07-17 | Stop reason: HOSPADM

## 2020-07-17 RX ORDER — OXYCODONE HYDROCHLORIDE 5 MG/1
5 TABLET ORAL EVERY 4 HOURS PRN
Qty: 30 TABLET | Refills: 0 | Status: ON HOLD | OUTPATIENT
Start: 2020-07-17 | End: 2021-03-03 | Stop reason: HOSPADM

## 2020-07-17 RX ORDER — PROPOFOL 10 MG/ML
VIAL (ML) INTRAVENOUS
Status: DISCONTINUED | OUTPATIENT
Start: 2020-07-17 | End: 2020-07-17

## 2020-07-17 RX ADMIN — ACETAMINOPHEN 1000 MG: 500 TABLET ORAL at 10:07

## 2020-07-17 RX ADMIN — ROCURONIUM BROMIDE 5 MG: 10 INJECTION, SOLUTION INTRAVENOUS at 10:07

## 2020-07-17 RX ADMIN — FENTANYL CITRATE 50 MCG: 50 INJECTION, SOLUTION INTRAMUSCULAR; INTRAVENOUS at 11:07

## 2020-07-17 RX ADMIN — PROPOFOL 200 MG: 10 INJECTION, EMULSION INTRAVENOUS at 10:07

## 2020-07-17 RX ADMIN — Medication 10 MG: at 11:07

## 2020-07-17 RX ADMIN — MIDAZOLAM HYDROCHLORIDE 2 MG: 1 INJECTION, SOLUTION INTRAMUSCULAR; INTRAVENOUS at 10:07

## 2020-07-17 RX ADMIN — SODIUM CHLORIDE, SODIUM GLUCONATE, SODIUM ACETATE, POTASSIUM CHLORIDE, MAGNESIUM CHLORIDE, SODIUM PHOSPHATE, DIBASIC, AND POTASSIUM PHOSPHATE: .53; .5; .37; .037; .03; .012; .00082 INJECTION, SOLUTION INTRAVENOUS at 11:07

## 2020-07-17 RX ADMIN — ONDANSETRON 4 MG: 2 INJECTION, SOLUTION INTRAMUSCULAR; INTRAVENOUS at 11:07

## 2020-07-17 RX ADMIN — SODIUM CHLORIDE: 0.9 INJECTION, SOLUTION INTRAVENOUS at 10:07

## 2020-07-17 RX ADMIN — Medication 20 MG: at 11:07

## 2020-07-17 RX ADMIN — SUCCINYLCHOLINE CHLORIDE 140 MG: 20 INJECTION, SOLUTION INTRAMUSCULAR; INTRAVENOUS at 10:07

## 2020-07-17 RX ADMIN — FENTANYL CITRATE 50 MCG: 50 INJECTION, SOLUTION INTRAMUSCULAR; INTRAVENOUS at 10:07

## 2020-07-17 RX ADMIN — DEXAMETHASONE SODIUM PHOSPHATE 8 MG: 4 INJECTION, SOLUTION INTRAMUSCULAR; INTRAVENOUS at 11:07

## 2020-07-17 RX ADMIN — LIDOCAINE HYDROCHLORIDE 75 MG: 20 INJECTION, SOLUTION INTRAVENOUS at 10:07

## 2020-07-17 NOTE — OP NOTE
DATE OF PROCEDURE: 7/17/2020     PREOPERATIVE DIAGNOSES:   1. Crohn's disease  2. Anal fistula    POSTOPERATIVE DIAGNOSES:   1. Crohn's disease  2. Anal fistula    PROCEDURES PERFORMED:  Examination under anesthesia with subcutaneous fistulotomy     SURGEON:  Pete Diaz M.D.     ASSISTANT: Lea Raya M.D. [RES]     ANESTHESIA: General endotracheal     IV FLUIDS:  1000 mL of crystalloid.     ESTIMATED BLOOD LOSS:  5 mL.     DRAINS: None     SPECIMENS: None    COMPLICATIONS:  None.     OPERATIVE FINDINGS:   1.  No evidence of transsphincteric fistula or abscess.  2.  Subcutaneous fistula traversing a large perianal skin tag in the right anterolateral position - subcutaneous fistulotomy performed  3.  Significant involvement of the anorectum with Crohn's disease - significant ulceration posterior half of the distal anal canal along with significant mucosal inflammation anteriorly and proximally.     INDICATIONS:  The patient is a 40-year-old male with a longstanding history of Crohn's disease having undergone a prior ileo colic resection by me several years ago.  He presented with anal pain, bleeding, drainage, and on exam there was some concern that he had an anal fistula.  He is being brought to the Operating Room today for examination under anesthesia .     DESCRIPTION OF PROCEDURE:  The patient was identified, brought to the Operating Room and placed on the stretcher in a supine position after obtaining informed consent.  Venous sequential stockings were placed.  After induction of general endotracheal anesthesia, he was repositioned on the operating table in a prone position using chest rolls and adequate padding of all pressure points.  The table was jackknifed and the buttocks were taped apart to efface the anal verge.  The perianal region was prepped and draped in the usual sterile fashion.      On external inspection, there was a large skin tag in the right anterolateral position close to the anterior  midline.  The area of concern was also in the right anterolateral position, somewhat more lateral and posterior.  This area was inspected closely, and while there was a moderate amount of skin inflammation and irritation, there was no obvious evidence of an external fistula opening.  We explored the area thoroughly with a fistula probe, and I was unable to demonstrate an external opening.  We inspected the anal canal with lighted retractors.  There was significant ulceration present in the posterior devang circumference of the distal anal canal and significant mucosal inflammation both anteriorly in the anal canal, and posteriorly proximal to the area of ulceration.  There was no evidence of intramural abscess formation.  I did inspect the skin tag present in the right anterolateral position a bit more closely, and there was evidence of a superficial fistula tract coursing through the tag.  This was unroofed using electrocautery, performing a subcutaneous fistulotomy.  No other significant pathology was identified.  There was some diffuse using coming from the inflamed anal mucosa, so I placed a Gelfoam gauze in the anal canal to assist with hemostasis.  An anal block was performed using a combination of Exparel and 0.25% Marcaine for postoperative analgesia, and sterile dressings were applied.     The patient tolerated the procedure well with no complications.  He was extubated in the Operating Room and taken to Recovery in satisfactory condition.   All needle, instrument and sponge counts were correct at the end of the case.    I was present throughout the entire procedure.    Pete Diaz MD, FACS, FASCRS  Senior Staff Surgeon  Department of Colon & Rectal Surgery     This note was created using voice recognition software, and may contain some unrecognized transcriptional errors.

## 2020-07-17 NOTE — TRANSFER OF CARE
Anesthesia Transfer of Care Note    Patient: Escobar Toro    Procedure(s) Performed: Procedure(s) (LRB):  FISTULOTOMY, RECTUM (N/A)  EXAM UNDER ANESTHESIA (N/A)    Patient location: PACU    Anesthesia Type: general    Transport from OR: Transported from OR on 6-10 L/min O2 by face mask with adequate spontaneous ventilation    Post pain: adequate analgesia    Post assessment: no apparent anesthetic complications    Post vital signs: stable    Level of consciousness: responds to stimulation    Nausea/Vomiting: no nausea/vomiting    Complications: none    Transfer of care protocol was followed      Last vitals:   Visit Vitals  BP (!) 144/94   Pulse 82   Temp 36.6 °C (97.8 °F) (Oral)   Resp 16   Ht 6' (1.829 m)   Wt 83.9 kg (185 lb)   SpO2 100%   BMI 25.09 kg/m²

## 2020-07-17 NOTE — PROGRESS NOTES
Subjective:       Patient ID: Escobar Toro is a 40 y.o. male.    Chief Complaint: Anal Fistula and Crohn's Disease    HPI  41 yo M with Crohn's disease, s/p laparoscopic ileocecal resection in 2017 - did well post-op but then was lost to follow-up and was off Crohn's meds due to insurance issues.    He presented last week with c/o perianal pain & drainage, intermittent rectal bleeding - was seen by CRS NP and found to have evidence of an anal fistula - was referred for further management.  He reported cyclical perianal pian and swelling followed by spontaneous drainage in relief of symptoms, only to have it recur again several days later.  He has intermittent episodes of abdominal pain and loose stools, though overall his bowel movements are fairly regular.  He has an appoint with Dr. Kerr later this week to reestablish GI care and resume medical management of his Crohn's disease.    Review of patient's allergies indicates:   Allergen Reactions    Remicade [infliximab] Swelling     Throat swelled       Past Medical History:   Diagnosis Date    Allergy     seasonal    Chronic diarrhea     Crohn disease     DDD (degenerative disc disease), cervical     Joint pain     Keloid cicatrix     Ulcer     Ulcerative colitis        Past Surgical History:   Procedure Laterality Date    ABCESS DRAINAGE      APPENDECTOMY      COLONOSCOPY N/A 12/5/2016    Procedure: COLONOSCOPY;  Surgeon: Steven Kerr MD;  Location: [facility] ([address] [place] [address]);  Service: Endoscopy;  Laterality: N/A;    LAPAROSCOPIC RESECTION OF SMALL INTESTINE         No current facility-administered medications for this visit.      No current outpatient medications on file.     Facility-Administered Medications Ordered in Other Visits   Medication Dose Route Frequency Provider Last Rate Last Dose    0.9%  NaCl infusion   Intravenous Continuous Dara Han NP        0.9%  NaCl infusion   Intravenous Continuous PRN Darnell Ken MD         acetaminophen tablet 1,000 mg  1,000 mg Oral Once Darnell Ken MD        mupirocin 2 % ointment   Nasal On Call Procedure Dara Han NP           Family History   Problem Relation Age of Onset    Arthritis Father     Cancer Maternal Grandmother     Melanoma Neg Hx     Psoriasis Neg Hx     Lupus Neg Hx     Eczema Neg Hx     Acne Neg Hx     Colon cancer Neg Hx     Rectal cancer Neg Hx     Stomach cancer Neg Hx     Crohn's disease Neg Hx     Esophageal cancer Neg Hx     Irritable bowel syndrome Neg Hx     Ulcerative colitis Neg Hx     Celiac disease Neg Hx        Social History     Socioeconomic History    Marital status:      Spouse name: Not on file    Number of children: 1    Years of education: Not on file    Highest education level: Not on file   Occupational History    Occupation: construction      Employer: [employer]   Social Needs    Financial resource strain: Not on file    Food insecurity     Worry: Not on file     Inability: Not on file    Transportation needs     Medical: Not on file     Non-medical: Not on file   Tobacco Use    Smoking status: Never Smoker    Smokeless tobacco: Never Used   Substance and Sexual Activity    Alcohol use: No    Drug use: No    Sexual activity: Yes   Lifestyle    Physical activity     Days per week: Not on file     Minutes per session: Not on file    Stress: Not on file   Relationships    Social connections     Talks on phone: Not on file     Gets together: Not on file     Attends Advent service: Not on file     Active member of club or organization: Not on file     Attends meetings of clubs or organizations: Not on file     Relationship status: Not on file   Other Topics Concern    Not on file   Social History Narrative    Not on file       Review of Systems   Constitutional: Negative for chills and fever.   HENT: Negative for congestion and sinus pressure.    Eyes: Negative for visual disturbance.   Respiratory: Negative  for cough and shortness of breath.    Cardiovascular: Negative for chest pain and palpitations.   Gastrointestinal: Positive for anal bleeding, diarrhea and rectal pain. Negative for abdominal distention, abdominal pain, blood in stool, constipation, nausea and vomiting.   Endocrine: Negative for cold intolerance and heat intolerance.   Genitourinary: Negative for dysuria and frequency.   Musculoskeletal: Negative for arthralgias and back pain.   Skin: Negative for rash.   Allergic/Immunologic: Negative for immunocompromised state.   Neurological: Negative for dizziness, light-headedness and headaches.   Psychiatric/Behavioral: Negative for confusion. The patient is not nervous/anxious.        Objective:      Physical Exam  Constitutional:       Appearance: He is well-developed.   HENT:      Head: Normocephalic and atraumatic.   Eyes:      Conjunctiva/sclera: Conjunctivae normal.   Pulmonary:      Effort: Pulmonary effort is normal. No respiratory distress.   Abdominal:      General: There is no distension.      Palpations: Abdomen is soft. There is no mass.      Tenderness: There is no abdominal tenderness. There is no guarding or rebound.   Genitourinary:     Comments: Perineum - inflammation of right anterolateral anal margin skin, no mass, + chronic appearing, wide PML fissure, large anterior midline anal skin tag, external fistula opening RAL anal margin 2-3 cm from the anal verge, possible 2nd external fistula opening just to the left of anterior midline, close to the anal verge.  CHRISTY/Anoscopy - not performed.    Skin:     General: Skin is warm and dry.      Findings: No erythema.   Neurological:      Mental Status: He is alert and oriented to person, place, and time.           Lab Results   Component Value Date    WBC 5.76 07/13/2020    HGB 13.2 (L) 07/13/2020    HCT 40.3 07/13/2020    MCV 91 07/13/2020     07/13/2020     BMP  Lab Results   Component Value Date     07/13/2020    K 4.1 07/13/2020      07/13/2020    CO2 27 07/13/2020    BUN 17 07/13/2020    CREATININE 1.2 07/13/2020    CALCIUM 9.4 07/13/2020    ANIONGAP 8 07/13/2020    ESTGFRAFRICA >60.0 07/13/2020    EGFRNONAA >60.0 07/13/2020     CMP  Sodium   Date Value Ref Range Status   07/13/2020 141 136 - 145 mmol/L Final     Potassium   Date Value Ref Range Status   07/13/2020 4.1 3.5 - 5.1 mmol/L Final     Chloride   Date Value Ref Range Status   07/13/2020 106 95 - 110 mmol/L Final     CO2   Date Value Ref Range Status   07/13/2020 27 23 - 29 mmol/L Final     Glucose   Date Value Ref Range Status   07/13/2020 84 70 - 110 mg/dL Final     BUN, Bld   Date Value Ref Range Status   07/13/2020 17 6 - 20 mg/dL Final     Creatinine   Date Value Ref Range Status   07/13/2020 1.2 0.5 - 1.4 mg/dL Final     Calcium   Date Value Ref Range Status   07/13/2020 9.4 8.7 - 10.5 mg/dL Final     Total Protein   Date Value Ref Range Status   07/13/2020 8.4 6.0 - 8.4 g/dL Final     Albumin   Date Value Ref Range Status   07/13/2020 4.0 3.5 - 5.2 g/dL Final     Total Bilirubin   Date Value Ref Range Status   07/13/2020 1.6 (H) 0.1 - 1.0 mg/dL Final     Comment:     For infants and newborns, interpretation of results should be based  on gestational age, weight and in agreement with clinical  observations.  Premature Infant recommended reference ranges:  Up to 24 hours.............<8.0 mg/dL  Up to 48 hours............<12.0 mg/dL  3-5 days..................<15.0 mg/dL  6-29 days.................<15.0 mg/dL       Alkaline Phosphatase   Date Value Ref Range Status   07/13/2020 110 55 - 135 U/L Final     AST   Date Value Ref Range Status   07/13/2020 18 10 - 40 U/L Final     ALT   Date Value Ref Range Status   07/13/2020 23 10 - 44 U/L Final     Anion Gap   Date Value Ref Range Status   07/13/2020 8 8 - 16 mmol/L Final     eGFR if    Date Value Ref Range Status   07/13/2020 >60.0 >60 mL/min/1.73 m^2 Final     eGFR if non    Date Value Ref  Range Status   07/13/2020 >60.0 >60 mL/min/1.73 m^2 Final     Comment:     Calculation used to obtain the estimated glomerular filtration  rate (eGFR) is the CKD-EPI equation.        No results found for: CEA        Assessment:       1. Anal fistula    2. Crohn's disease of both small and large intestine with fistula        Plan:   Scheduled for EUA in the OR later this week, likely with seton drainage of the fistula.  Follow-up with GI to reestablish medical management of Crohn's disease - suspect he will need restaging with colonoscopy abdominal imaging.    I have discussed the procedure at length with Escobar Toro.  We discussed the rationale, risks, benefits, and alternatives in depth.  We discussed the expected outcomes and potential complications including but not limited to bleeding, infection, recurrence, prolonged pain, need for further procedures and altered continence.  He verbalized his understanding of the procedure and wishes to proceed.  Written consent was obtained.    Pete Diaz MD, FACS, FASCRS  Senior Staff Surgeon  Department of Colon & Rectal Surgery     This note was created using voice recognition software, and may contain some unrecognized transcriptional errors.

## 2020-07-17 NOTE — H&P (VIEW-ONLY)
Subjective:       Patient ID: Escobar Toro is a 40 y.o. male.    Chief Complaint: Anal Fistula and Crohn's Disease    HPI  41 yo M with Crohn's disease, s/p laparoscopic ileocecal resection in 2017 - did well post-op but then was lost to follow-up and was off Crohn's meds due to insurance issues.    He presented last week with c/o perianal pain & drainage, intermittent rectal bleeding - was seen by CRS NP and found to have evidence of an anal fistula - was referred for further management.  He reported cyclical perianal pian and swelling followed by spontaneous drainage in relief of symptoms, only to have it recur again several days later.  He has intermittent episodes of abdominal pain and loose stools, though overall his bowel movements are fairly regular.  He has an appoint with Dr. Kerr later this week to reestablish GI care and resume medical management of his Crohn's disease.    Review of patient's allergies indicates:   Allergen Reactions    Remicade [infliximab] Swelling     Throat swelled       Past Medical History:   Diagnosis Date    Allergy     seasonal    Chronic diarrhea     Crohn disease     DDD (degenerative disc disease), cervical     Joint pain     Keloid cicatrix     Ulcer     Ulcerative colitis        Past Surgical History:   Procedure Laterality Date    ABCESS DRAINAGE      APPENDECTOMY      COLONOSCOPY N/A 12/5/2016    Procedure: COLONOSCOPY;  Surgeon: Steven Kerr MD;  Location: [facility] ([address] [place] [address]);  Service: Endoscopy;  Laterality: N/A;    LAPAROSCOPIC RESECTION OF SMALL INTESTINE         No current facility-administered medications for this visit.      No current outpatient medications on file.     Facility-Administered Medications Ordered in Other Visits   Medication Dose Route Frequency Provider Last Rate Last Dose    0.9%  NaCl infusion   Intravenous Continuous Dara Han NP        0.9%  NaCl infusion   Intravenous Continuous PRN Darnell Ken MD         acetaminophen tablet 1,000 mg  1,000 mg Oral Once Darnell Ken MD        mupirocin 2 % ointment   Nasal On Call Procedure Dara Han NP           Family History   Problem Relation Age of Onset    Arthritis Father     Cancer Maternal Grandmother     Melanoma Neg Hx     Psoriasis Neg Hx     Lupus Neg Hx     Eczema Neg Hx     Acne Neg Hx     Colon cancer Neg Hx     Rectal cancer Neg Hx     Stomach cancer Neg Hx     Crohn's disease Neg Hx     Esophageal cancer Neg Hx     Irritable bowel syndrome Neg Hx     Ulcerative colitis Neg Hx     Celiac disease Neg Hx        Social History     Socioeconomic History    Marital status:      Spouse name: Not on file    Number of children: 1    Years of education: Not on file    Highest education level: Not on file   Occupational History    Occupation: construction      Employer: [employer]   Social Needs    Financial resource strain: Not on file    Food insecurity     Worry: Not on file     Inability: Not on file    Transportation needs     Medical: Not on file     Non-medical: Not on file   Tobacco Use    Smoking status: Never Smoker    Smokeless tobacco: Never Used   Substance and Sexual Activity    Alcohol use: No    Drug use: No    Sexual activity: Yes   Lifestyle    Physical activity     Days per week: Not on file     Minutes per session: Not on file    Stress: Not on file   Relationships    Social connections     Talks on phone: Not on file     Gets together: Not on file     Attends Jewish service: Not on file     Active member of club or organization: Not on file     Attends meetings of clubs or organizations: Not on file     Relationship status: Not on file   Other Topics Concern    Not on file   Social History Narrative    Not on file       Review of Systems   Constitutional: Negative for chills and fever.   HENT: Negative for congestion and sinus pressure.    Eyes: Negative for visual disturbance.   Respiratory: Negative  for cough and shortness of breath.    Cardiovascular: Negative for chest pain and palpitations.   Gastrointestinal: Positive for anal bleeding, diarrhea and rectal pain. Negative for abdominal distention, abdominal pain, blood in stool, constipation, nausea and vomiting.   Endocrine: Negative for cold intolerance and heat intolerance.   Genitourinary: Negative for dysuria and frequency.   Musculoskeletal: Negative for arthralgias and back pain.   Skin: Negative for rash.   Allergic/Immunologic: Negative for immunocompromised state.   Neurological: Negative for dizziness, light-headedness and headaches.   Psychiatric/Behavioral: Negative for confusion. The patient is not nervous/anxious.        Objective:      Physical Exam  Constitutional:       Appearance: He is well-developed.   HENT:      Head: Normocephalic and atraumatic.   Eyes:      Conjunctiva/sclera: Conjunctivae normal.   Pulmonary:      Effort: Pulmonary effort is normal. No respiratory distress.   Abdominal:      General: There is no distension.      Palpations: Abdomen is soft. There is no mass.      Tenderness: There is no abdominal tenderness. There is no guarding or rebound.   Genitourinary:     Comments: Perineum - inflammation of right anterolateral anal margin skin, no mass, + chronic appearing, wide PML fissure, large anterior midline anal skin tag, external fistula opening RAL anal margin 2-3 cm from the anal verge, possible 2nd external fistula opening just to the left of anterior midline, close to the anal verge.  CHRISTY/Anoscopy - not performed.    Skin:     General: Skin is warm and dry.      Findings: No erythema.   Neurological:      Mental Status: He is alert and oriented to person, place, and time.           Lab Results   Component Value Date    WBC 5.76 07/13/2020    HGB 13.2 (L) 07/13/2020    HCT 40.3 07/13/2020    MCV 91 07/13/2020     07/13/2020     BMP  Lab Results   Component Value Date     07/13/2020    K 4.1 07/13/2020      07/13/2020    CO2 27 07/13/2020    BUN 17 07/13/2020    CREATININE 1.2 07/13/2020    CALCIUM 9.4 07/13/2020    ANIONGAP 8 07/13/2020    ESTGFRAFRICA >60.0 07/13/2020    EGFRNONAA >60.0 07/13/2020     CMP  Sodium   Date Value Ref Range Status   07/13/2020 141 136 - 145 mmol/L Final     Potassium   Date Value Ref Range Status   07/13/2020 4.1 3.5 - 5.1 mmol/L Final     Chloride   Date Value Ref Range Status   07/13/2020 106 95 - 110 mmol/L Final     CO2   Date Value Ref Range Status   07/13/2020 27 23 - 29 mmol/L Final     Glucose   Date Value Ref Range Status   07/13/2020 84 70 - 110 mg/dL Final     BUN, Bld   Date Value Ref Range Status   07/13/2020 17 6 - 20 mg/dL Final     Creatinine   Date Value Ref Range Status   07/13/2020 1.2 0.5 - 1.4 mg/dL Final     Calcium   Date Value Ref Range Status   07/13/2020 9.4 8.7 - 10.5 mg/dL Final     Total Protein   Date Value Ref Range Status   07/13/2020 8.4 6.0 - 8.4 g/dL Final     Albumin   Date Value Ref Range Status   07/13/2020 4.0 3.5 - 5.2 g/dL Final     Total Bilirubin   Date Value Ref Range Status   07/13/2020 1.6 (H) 0.1 - 1.0 mg/dL Final     Comment:     For infants and newborns, interpretation of results should be based  on gestational age, weight and in agreement with clinical  observations.  Premature Infant recommended reference ranges:  Up to 24 hours.............<8.0 mg/dL  Up to 48 hours............<12.0 mg/dL  3-5 days..................<15.0 mg/dL  6-29 days.................<15.0 mg/dL       Alkaline Phosphatase   Date Value Ref Range Status   07/13/2020 110 55 - 135 U/L Final     AST   Date Value Ref Range Status   07/13/2020 18 10 - 40 U/L Final     ALT   Date Value Ref Range Status   07/13/2020 23 10 - 44 U/L Final     Anion Gap   Date Value Ref Range Status   07/13/2020 8 8 - 16 mmol/L Final     eGFR if    Date Value Ref Range Status   07/13/2020 >60.0 >60 mL/min/1.73 m^2 Final     eGFR if non    Date Value Ref  Range Status   07/13/2020 >60.0 >60 mL/min/1.73 m^2 Final     Comment:     Calculation used to obtain the estimated glomerular filtration  rate (eGFR) is the CKD-EPI equation.        No results found for: CEA        Assessment:       1. Anal fistula    2. Crohn's disease of both small and large intestine with fistula        Plan:   Scheduled for EUA in the OR later this week, likely with seton drainage of the fistula.  Follow-up with GI to reestablish medical management of Crohn's disease - suspect he will need restaging with colonoscopy abdominal imaging.    I have discussed the procedure at length with Escobar Toro.  We discussed the rationale, risks, benefits, and alternatives in depth.  We discussed the expected outcomes and potential complications including but not limited to bleeding, infection, recurrence, prolonged pain, need for further procedures and altered continence.  He verbalized his understanding of the procedure and wishes to proceed.  Written consent was obtained.    Pete Diaz MD, FACS, FASCRS  Senior Staff Surgeon  Department of Colon & Rectal Surgery     This note was created using voice recognition software, and may contain some unrecognized transcriptional errors.

## 2020-07-17 NOTE — DISCHARGE INSTRUCTIONS

## 2020-07-17 NOTE — BRIEF OP NOTE
[facility]  Brief Operative Note    Surgery Date: 7/17/2020     Surgeon(s) and Role:     * Pete Diaz MD - Primary     * Lea Raya MD - Resident - Assisting        Pre-op Diagnosis:  Anal fistula [K60.3]  Crohn's disease of both small and large intestine with fistula [K50.813]    Post-op Diagnosis:  Post-Op Diagnosis Codes:     * Anal fistula [K60.3]     * Crohn's disease of both small and large intestine with fistula [K50.813]    Procedure(s) (LRB):  FISTULOTOMY, RECTUM (N/A)  EXAM UNDER ANESTHESIA (N/A)    Anesthesia: General    Description of the findings of the procedure(s): Right lateral posterior fistula within perianal skin. Fistulotomy performed.     Estimated Blood Loss: 1cc         Specimens:   Specimen (12h ago, onward)    None            Discharge Note    OUTCOME: Patient tolerated treatment/procedure well without complication and is now ready for discharge.    DISPOSITION: Home or Self Care    FINAL DIAGNOSIS:  Anal fistula    FOLLOWUP: In clinic    DISCHARGE INSTRUCTIONS:    Discharge Procedure Orders   Diet Adult Regular     Notify your health care provider if you experience any of the following:  increased confusion or weakness     Notify your health care provider if you experience any of the following:  persistent dizziness, light-headedness, or visual disturbances     Notify your health care provider if you experience any of the following:  severe persistent headache     Notify your health care provider if you experience any of the following:  difficulty breathing or increased cough     Notify your health care provider if you experience any of the following:  severe uncontrolled pain     Notify your health care provider if you experience any of the following:  redness, tenderness, or signs of infection (pain, swelling, redness, odor or green/yellow discharge around incision site)     Notify your health care provider if you experience any of the following:  temperature  >100.4     Notify your health care provider if you experience any of the following:  persistent nausea and vomiting or diarrhea     Change dressing (specify)   Order Comments: Anal Surgery Post Op Instructions:    You had a fistulotomy performed today.     Wound care:  Rectal packing will fall out on its own with next bowel movement and does not need to be replaced.  Expect some drainage over the next few weeks as the wound heals.  Please wear a pad to help with drainage.     You have no activity restrictions.   No dietary restrictions.    Medications:  A narcotic pain medication has been prescribed.  Please start to wean the pain medication over the following few days.  You can take tylenol and/or ibuprofen instead of the pain medication.      Please take miralax 1 capful at night to prevent constipation associated with narcotic pain medications.      Follow up:  Return to clinic in 3 weeks for follow up with Dr. Diaz for wound check.    Lea Raya MD  General Surgery, PGY-1     Activity as tolerated     Lea Raya MD  General Surgery, PGY-1

## 2020-07-17 NOTE — ANESTHESIA PROCEDURE NOTES
Intubation  Performed by: Darnell Ken MD  Authorized by: Ly Garcia MD     Intubation:     Induction:  Intravenous    Intubated:  Postinduction    Mask Ventilation:  Easy with oral airway    Attempts:  2    Attempted By:  Resident anesthesiologist    Method of Intubation:  Direct    Blade:  Whitten 2    Laryngeal View Grade: Grade III - only epiglottis visible      Attempted By (2nd Attempt):  Staff anesthesiologist    Method of Intubation (2nd Attempt):  Direct    Blade (2nd Attempt):  Whitten 2    Laryngeal View Grade (2nd Attempt): Grade IIb - only the arytenoids and epiglottis seen      Difficult Airway Encountered?: No      Complications:  None    Airway Device:  Oral endotracheal tube    Airway Device Size:  7.5    Style/Cuff Inflation:  Cuffed (inflated to minimal occlusive pressure)    Tube secured:  22    Secured at:  The teeth    Placement Verified By:  Capnometry    Complicating Factors:  Anterior larynx, large/floppy epiglottis and retrognathia    Findings Post-Intubation:  BS equal bilateral

## 2020-07-17 NOTE — INTERVAL H&P NOTE
The patient has been examined and the H&P has been reviewed:    I concur with the findings and no changes have occurred since H&P was written.    Anesthesia/Surgery risks, benefits and alternative options discussed and understood by patient/family.    Lea Raya MD  General Surgery, PGY-1      Active Hospital Problems    Diagnosis  POA    Anal fistula [K60.3]  Yes      Resolved Hospital Problems   No resolved problems to display.

## 2020-07-17 NOTE — PLAN OF CARE
Discharge instructions given, Pt verbalizes understanding. Consents in chart. Vital Signs stable.  Respirations even and unlabored. No distress noted. Tolerating liquids.    No complaints of Nausea or Vomiting. No questions or concerns at this time. All questions answered

## 2020-07-23 NOTE — ANESTHESIA POSTPROCEDURE EVALUATION
Anesthesia Post Evaluation    Patient: Escobar Toro    Procedure(s) Performed: Procedure(s) (LRB):  FISTULOTOMY, RECTUM (N/A)  EXAM UNDER ANESTHESIA (N/A)    Final Anesthesia Type: general    Patient location during evaluation: PACU  Patient participation: Yes- Able to Participate  Level of consciousness: awake and alert and oriented  Post-procedure vital signs: reviewed and stable  Pain management: adequate  Airway patency: patent    PONV status at discharge: No PONV  Anesthetic complications: no      Cardiovascular status: blood pressure returned to baseline and hemodynamically stable  Respiratory status: unassisted and spontaneous ventilation  Hydration status: euvolemic  Follow-up not needed.          Vitals Value Taken Time   /93 07/17/20 1300   Temp 36.7 °C (98.1 °F) 07/17/20 1300   Pulse 69 07/17/20 1300   Resp 18 07/17/20 1300   SpO2 97 % 07/17/20 1300         No case tracking events are documented in the log.      Pain/Mark Score: No data recorded

## 2020-07-27 ENCOUNTER — LAB VISIT (OUTPATIENT)
Dept: SPORTS MEDICINE | Facility: CLINIC | Age: 40
End: 2020-07-27
Payer: COMMERCIAL

## 2020-07-27 ENCOUNTER — HOSPITAL ENCOUNTER (OUTPATIENT)
Dept: RADIOLOGY | Facility: HOSPITAL | Age: 40
Discharge: HOME | End: 2020-07-27
Attending: INTERNAL MEDICINE
Payer: COMMERCIAL

## 2020-07-27 DIAGNOSIS — K50.819 CROHN'S DISEASE OF SMALL AND LARGE INTESTINES WITH COMPLICATION: ICD-10-CM

## 2020-07-27 DIAGNOSIS — Z01.812 PRE-PROCEDURE LAB EXAM: ICD-10-CM

## 2020-07-27 PROCEDURE — A9698 NON-RAD CONTRAST MATERIALNOC: HCPCS | Performed by: INTERNAL MEDICINE

## 2020-07-27 PROCEDURE — 25500020 PHARM REV CODE 255: Performed by: INTERNAL MEDICINE

## 2020-07-27 PROCEDURE — 74177 CT ABD & PELVIS W/CONTRAST: CPT | Mod: TC

## 2020-07-27 PROCEDURE — 74177 CT ENTEROGRAPHY ABD_PELVIS WITH CONTRAST: ICD-10-PCS | Mod: 26,,, | Performed by: RADIOLOGY

## 2020-07-27 PROCEDURE — U0003 INFECTIOUS AGENT DETECTION BY NUCLEIC ACID (DNA OR RNA); SEVERE ACUTE RESPIRATORY SYNDROME CORONAVIRUS 2 (SARS-COV-2) (CORONAVIRUS DISEASE [COVID-19]), AMPLIFIED PROBE TECHNIQUE, MAKING USE OF HIGH THROUGHPUT TECHNOLOGIES AS DESCRIBED BY CMS-2020-01-R: HCPCS

## 2020-07-27 PROCEDURE — 74177 CT ABD & PELVIS W/CONTRAST: CPT | Mod: 26,,, | Performed by: RADIOLOGY

## 2020-07-27 RX ADMIN — BARIUM SULFATE 450 ML: 1 SUSPENSION ORAL at 04:07

## 2020-07-27 RX ADMIN — IOHEXOL 100 ML: 350 INJECTION, SOLUTION INTRAVENOUS at 04:07

## 2020-07-28 ENCOUNTER — TELEPHONE (OUTPATIENT)
Dept: GASTROENTEROLOGY | Facility: CLINIC | Age: 40
End: 2020-07-28

## 2020-07-28 DIAGNOSIS — K50.113 CROHN'S DISEASE OF LARGE INTESTINE WITH FISTULA: Primary | ICD-10-CM

## 2020-07-28 LAB — SARS-COV-2 RNA RESP QL NAA+PROBE: NOT DETECTED

## 2020-07-28 NOTE — TELEPHONE ENCOUNTER
----- Message from Steven Kerr MD sent at 7/28/2020 10:02 AM CDT -----  CT scan looks fine. Please schedule blood test for Humira antibodies.

## 2020-07-28 NOTE — TELEPHONE ENCOUNTER
Spoke with patient , results given and labs scheduled as written by Dr. Kerr  Pt verbalizes understadning and appreciates the call.  Thanks MA

## 2020-07-30 ENCOUNTER — ANESTHESIA EVENT (OUTPATIENT)
Dept: ENDOSCOPY | Facility: HOSPITAL | Age: 40
End: 2020-07-30
Payer: COMMERCIAL

## 2020-07-30 ENCOUNTER — ANESTHESIA (OUTPATIENT)
Dept: ENDOSCOPY | Facility: HOSPITAL | Age: 40
End: 2020-07-30
Payer: COMMERCIAL

## 2020-07-30 ENCOUNTER — HOSPITAL ENCOUNTER (OUTPATIENT)
Facility: HOSPITAL | Age: 40
Discharge: HOME | End: 2020-07-30
Attending: INTERNAL MEDICINE | Admitting: INTERNAL MEDICINE
Payer: COMMERCIAL

## 2020-07-30 VITALS
WEIGHT: 185 LBS | TEMPERATURE: 98 F | HEIGHT: 72 IN | BODY MASS INDEX: 25.06 KG/M2 | SYSTOLIC BLOOD PRESSURE: 127 MMHG | OXYGEN SATURATION: 99 % | DIASTOLIC BLOOD PRESSURE: 81 MMHG | HEART RATE: 78 BPM | RESPIRATION RATE: 16 BRPM

## 2020-07-30 DIAGNOSIS — K50.10 CROHN'S COLITIS: ICD-10-CM

## 2020-07-30 DIAGNOSIS — K50.80 CROHN'S DISEASE OF BOTH SMALL AND LARGE INTESTINE WITHOUT COMPLICATION: Primary | ICD-10-CM

## 2020-07-30 PROCEDURE — 43235 PR EGD, FLEX, DIAGNOSTIC: ICD-10-PCS | Mod: 51,,, | Performed by: INTERNAL MEDICINE

## 2020-07-30 PROCEDURE — 37000008 HC ANESTHESIA 1ST 15 MINUTES: Performed by: INTERNAL MEDICINE

## 2020-07-30 PROCEDURE — 45380 COLONOSCOPY AND BIOPSY: CPT | Mod: ,,, | Performed by: INTERNAL MEDICINE

## 2020-07-30 PROCEDURE — 63600175 PHARM REV CODE 636 W HCPCS: Performed by: NURSE ANESTHETIST, CERTIFIED REGISTERED

## 2020-07-30 PROCEDURE — 88305 TISSUE EXAM BY PATHOLOGIST: CPT | Mod: 26,,, | Performed by: PATHOLOGY

## 2020-07-30 PROCEDURE — E9220 PRA ENDO ANESTHESIA: HCPCS | Mod: ,,, | Performed by: NURSE ANESTHETIST, CERTIFIED REGISTERED

## 2020-07-30 PROCEDURE — 45380 COLONOSCOPY AND BIOPSY: CPT | Performed by: INTERNAL MEDICINE

## 2020-07-30 PROCEDURE — 43235 EGD DIAGNOSTIC BRUSH WASH: CPT | Mod: 51,,, | Performed by: INTERNAL MEDICINE

## 2020-07-30 PROCEDURE — 45380 PR COLONOSCOPY,BIOPSY: ICD-10-PCS | Mod: ,,, | Performed by: INTERNAL MEDICINE

## 2020-07-30 PROCEDURE — E9220 PRA ENDO ANESTHESIA: ICD-10-PCS | Mod: ,,, | Performed by: NURSE ANESTHETIST, CERTIFIED REGISTERED

## 2020-07-30 PROCEDURE — 88305 TISSUE EXAM BY PATHOLOGIST: CPT | Performed by: PATHOLOGY

## 2020-07-30 PROCEDURE — 43235 EGD DIAGNOSTIC BRUSH WASH: CPT | Performed by: INTERNAL MEDICINE

## 2020-07-30 PROCEDURE — 27201012 HC FORCEPS, HOT/COLD, DISP: Performed by: INTERNAL MEDICINE

## 2020-07-30 PROCEDURE — 37000009 HC ANESTHESIA EA ADD 15 MINS: Performed by: INTERNAL MEDICINE

## 2020-07-30 PROCEDURE — 88305 TISSUE EXAM BY PATHOLOGIST: ICD-10-PCS | Mod: 26,,, | Performed by: PATHOLOGY

## 2020-07-30 PROCEDURE — 25000003 PHARM REV CODE 250: Performed by: INTERNAL MEDICINE

## 2020-07-30 RX ORDER — SODIUM CHLORIDE 9 MG/ML
INJECTION, SOLUTION INTRAVENOUS CONTINUOUS
Status: DISCONTINUED | OUTPATIENT
Start: 2020-07-30 | End: 2020-07-30 | Stop reason: HOSPADM

## 2020-07-30 RX ORDER — PROPOFOL 10 MG/ML
VIAL (ML) INTRAVENOUS
Status: DISCONTINUED | OUTPATIENT
Start: 2020-07-30 | End: 2020-07-30

## 2020-07-30 RX ORDER — PROPOFOL 10 MG/ML
VIAL (ML) INTRAVENOUS CONTINUOUS PRN
Status: DISCONTINUED | OUTPATIENT
Start: 2020-07-30 | End: 2020-07-30

## 2020-07-30 RX ORDER — LIDOCAINE HCL/PF 100 MG/5ML
SYRINGE (ML) INTRAVENOUS
Status: DISCONTINUED | OUTPATIENT
Start: 2020-07-30 | End: 2020-07-30

## 2020-07-30 RX ADMIN — PROPOFOL 100 MG: 10 INJECTION, EMULSION INTRAVENOUS at 02:07

## 2020-07-30 RX ADMIN — PROPOFOL 200 MCG/KG/MIN: 10 INJECTION, EMULSION INTRAVENOUS at 02:07

## 2020-07-30 RX ADMIN — SODIUM CHLORIDE: 0.9 INJECTION, SOLUTION INTRAVENOUS at 01:07

## 2020-07-30 RX ADMIN — Medication 100 MG: at 02:07

## 2020-07-30 NOTE — TRANSFER OF CARE
Anesthesia Transfer of Care Note    Patient: Escobar Toro    Procedure(s) Performed: Procedure(s) (LRB):  EGD (ESOPHAGOGASTRODUODENOSCOPY) (N/A)  COLONOSCOPY (N/A)    Patient location: GI    Anesthesia Type: general    Transport from OR: Transported from OR on room air with adequate spontaneous ventilation    Post pain: adequate analgesia    Post assessment: no apparent anesthetic complications and tolerated procedure well    Post vital signs: stable    Level of consciousness: sedated    Nausea/Vomiting: no nausea/vomiting    Complications: none    Transfer of care protocol was followed      Last vitals:   Visit Vitals  BP (!) 143/92 (BP Location: Left arm, Patient Position: Lying)   Pulse 84   Temp 36.7 °C (98 °F) (Temporal)   Resp 14   Ht 6' (1.829 m)   Wt 83.9 kg (185 lb)   SpO2 98%   BMI 25.09 kg/m²

## 2020-07-30 NOTE — ANESTHESIA PREPROCEDURE EVALUATION
07/30/2020  Escobar Toro is a 40 y.o., male.    Past Medical History:   Diagnosis Date    Allergy     seasonal    Chronic diarrhea     Crohn disease     DDD (degenerative disc disease), cervical     Joint pain     Keloid cicatrix     Ulcer     Ulcerative colitis      Past Surgical History:   Procedure Laterality Date    ABCESS DRAINAGE      APPENDECTOMY      COLON SURGERY  2017    Laparoscopic ileocolic resection    COLONOSCOPY N/A 12/5/2016    Procedure: COLONOSCOPY;  Surgeon: Steven Kerr MD;  Location: [facility] (4TH FLR);  Service: Endoscopy;  Laterality: N/A;    EXAMINATION UNDER ANESTHESIA N/A 7/17/2020    Procedure: EXAM UNDER ANESTHESIA;  Surgeon: Pete Diaz MD;  Location: [facility] OR 2ND FLR;  Service: Colon and Rectal;  Laterality: N/A;    LAPAROSCOPIC RESECTION OF SMALL INTESTINE      RECTAL FISTULOTOMY N/A 7/17/2020    Procedure: FISTULOTOMY, RECTUM;  Surgeon: Pete Diaz MD;  Location: [facility] OR 2ND FLR;  Service: Colon and Rectal;  Laterality: N/A;     Patient Active Problem List   Diagnosis    Crohn's disease of both small and large intestine without complication    DDD (degenerative disc disease), cervical    Acute Crohn's disease    Crohn's colitis    Non-intractable vomiting with nausea    Diarrhea of presumed infectious origin    Volume depletion, gastrointestinal loss    Lower abdominal pain    Rectal pain    Viral URI with cough    Wheezing    Anal fistula         Anesthesia Evaluation    I have reviewed the Patient Summary Reports.   I have reviewed the NPO Status.   I have reviewed the Medications.     Review of Systems  Anesthesia Hx:   Denies Personal Hx of Anesthesia complications.   Hematology/Oncology:  Hematology Normal   Oncology Normal     EENT/Dental:EENT/Dental Normal   Cardiovascular:  Cardiovascular Normal     Pulmonary:  Pulmonary  Normal    Renal/:  Renal/ Normal     Hepatic/GI:   Bowel Prep. PUD, Ulcerative colitis   Musculoskeletal:  Musculoskeletal Normal Degenerative Disc disease   Neurological:  Neurology Normal    Endocrine:  Endocrine Normal    Dermatological:  Skin Normal    Psych:  Psychiatric Normal           Physical Exam  General:  Well nourished    Airway/Jaw/Neck:  Airway Findings: Mouth Opening: Normal General Airway Assessment: Adult  Mallampati: II  Improves to I with phonation.  TM Distance: Normal, at least 6 cm        Eyes/Ears/Nose:  EYES/EARS/NOSE FINDINGS: Normal   Dental:  Dental Findings: In tact   Chest/Lungs:  Chest/Lungs Clear    Heart/Vascular:  Heart Findings: Normal Heart murmur: negative Vascular Findings: Normal    Abdomen:  Abdomen Findings: Normal    Musculoskeletal:  Musculoskeletal Findings: Normal   Skin:  Skin Findings: Normal    Mental Status:  Mental Status Findings: Normal        Anesthesia Plan  Type of Anesthesia, risks & benefits discussed:  Anesthesia Type:  general  Patient's Preference:   Intra-op Monitoring Plan: standard ASA monitors  Intra-op Monitoring Plan Comments:   Post Op Pain Control Plan:   Post Op Pain Control Plan Comments:   Induction:   IV  Beta Blocker:  Patient is not currently on a Beta-Blocker (No further documentation required).       Informed Consent: Patient understands risks and agrees with Anesthesia plan.  Questions answered. Anesthesia consent signed with patient.  ASA Score: 1     Day of Surgery Review of History & Physical:    H&P update referred to the surgeon.         Ready For Surgery From Anesthesia Perspective.

## 2020-07-30 NOTE — ANESTHESIA POSTPROCEDURE EVALUATION
Anesthesia Post Evaluation    Patient: Escobar Toro    Procedure(s) Performed: Procedure(s) (LRB):  EGD (ESOPHAGOGASTRODUODENOSCOPY) (N/A)  COLONOSCOPY (N/A)    Final Anesthesia Type: general    Patient location during evaluation: PACU  Patient participation: Yes- Able to Participate  Level of consciousness: awake and alert and oriented  Post-procedure vital signs: reviewed and stable  Pain management: adequate  Airway patency: patent    PONV status at discharge: No PONV  Anesthetic complications: no      Cardiovascular status: hemodynamically stable  Respiratory status: unassisted  Hydration status: euvolemic  Follow-up not needed.          Vitals Value Taken Time   /81 07/30/20 1505   Temp 36.6 °C (97.9 °F) 07/30/20 1435   Pulse 78 07/30/20 1505   Resp 16 07/30/20 1505   SpO2 99 % 07/30/20 1505         Event Time   Out of Recovery 15:09:56         Pain/Mark Score: Mark Score: 9 (7/30/2020  2:35 PM)

## 2020-07-30 NOTE — DISCHARGE INSTRUCTIONS

## 2020-07-30 NOTE — [employer] PATIENT INSTRUCTIONS
Discharge Summary/Instructions after an Endoscopic Procedure  Patient Name: Escobar Toro  Patient MRN: [id number]  Patient YOB: 1980 Thursday, July 30, 2020  Steven Kerr MD  RESTRICTIONS:  During your procedure today, you received medications for sedation.  These   medications may affect your judgment, balance and coordination.  Therefore,   for 24 hours, you have the following restrictions:   - DO NOT drive a car, operate machinery, make legal/financial decisions,   sign important papers or drink alcohol.    ACTIVITY:  Today: no heavy lifting, straining or running due to procedural   sedation/anesthesia.  The following day: return to full activity including work.  DIET:  Eat and drink normally unless instructed otherwise.     TREATMENT FOR COMMON SIDE EFFECTS:  - Mild abdominal pain, nausea, belching, bloating or excessive gas:  rest,   eat lightly and use a heating pad.  - Sore Throat: treat with throat lozenges and/or gargle with warm salt   water.  - Because air was used during the procedure, expelling large amounts of air   from your rectum or belching is normal.  - If a bowel prep was taken, you may not have a bowel movement for 1-3 days.    This is normal.  SYMPTOMS TO WATCH FOR AND REPORT TO YOUR PHYSICIAN:  1. Abdominal pain or bloating, other than gas cramps.  2. Chest pain.  3. Back pain.  4. Signs of infection such as: chills or fever occurring within 24 hours   after the procedure.  5. Rectal bleeding, which would show as bright red, maroon, or black stools.   (A tablespoon of blood from the rectum is not serious, especially if   hemorrhoids are present.)  6. Vomiting.  7. Weakness or dizziness.  GO DIRECTLY TO THE NEAREST EMERGENCY ROOM IF YOU HAVE ANY OF THE FOLLOWING:      Difficulty breathing              Chills and/or fever over 101 F   Persistent vomiting and/or vomiting blood   Severe abdominal pain   Severe chest pain   Black, tarry stools   Bleeding- more than one  tablespoon   Any other symptom or condition that you feel may need urgent attention  Your doctor recommends these additional instructions:  If any biopsies were taken, your doctors clinic will contact you in 1 to 2   weeks with any results.  - Patient has a contact number available for emergencies.  The signs and   symptoms of potential delayed complications were discussed with the   patient.  Return to normal activities tomorrow.  Written discharge   instructions were provided to the patient.   - Discharge patient to home.   - Resume previous diet.   - Continue present medications.   For questions, problems or results please call your physician - Steven Kerr MD at Work:  [phone].  [facility], EMERGENCY ROOM PHONE NUMBER: [phone]  IF A COMPLICATION OR EMERGENCY SITUATION ARISES AND YOU ARE UNABLE TO REACH   YOUR PHYSICIAN - GO DIRECTLY TO THE EMERGENCY ROOM.  Steven Kerr MD  7/30/2020 2:11:16 PM  This report has been verified and signed electronically.  [employer]

## 2020-07-30 NOTE — INTERVAL H&P NOTE
The patient has been examined and the H&P has been reviewed:    There is no interval changes since last encounter.    EGD/Colonoscopy: Crohn's colitis  Sedation: GA  ASA: Per anesthesia  Mallampati: Per anesthesia    Endoscopy risks, benefits and alternative options discussed and understood by patient/family.          Active Hospital Problems    Diagnosis  POA    Crohn's colitis [K50.10]  Yes      Resolved Hospital Problems   No resolved problems to display.

## 2020-07-30 NOTE — [employer] PATIENT INSTRUCTIONS
Discharge Summary/Instructions after an Endoscopic Procedure  Patient Name: Escobar Toro  Patient MRN: [id number]  Patient YOB: 1980 Thursday, July 30, 2020  Steven Kerr MD  RESTRICTIONS:  During your procedure today, you received medications for sedation.  These   medications may affect your judgment, balance and coordination.  Therefore,   for 24 hours, you have the following restrictions:   - DO NOT drive a car, operate machinery, make legal/financial decisions,   sign important papers or drink alcohol.    ACTIVITY:  Today: no heavy lifting, straining or running due to procedural   sedation/anesthesia.  The following day: return to full activity including work.  DIET:  Eat and drink normally unless instructed otherwise.     TREATMENT FOR COMMON SIDE EFFECTS:  - Mild abdominal pain, nausea, belching, bloating or excessive gas:  rest,   eat lightly and use a heating pad.  - Sore Throat: treat with throat lozenges and/or gargle with warm salt   water.  - Because air was used during the procedure, expelling large amounts of air   from your rectum or belching is normal.  - If a bowel prep was taken, you may not have a bowel movement for 1-3 days.    This is normal.  SYMPTOMS TO WATCH FOR AND REPORT TO YOUR PHYSICIAN:  1. Abdominal pain or bloating, other than gas cramps.  2. Chest pain.  3. Back pain.  4. Signs of infection such as: chills or fever occurring within 24 hours   after the procedure.  5. Rectal bleeding, which would show as bright red, maroon, or black stools.   (A tablespoon of blood from the rectum is not serious, especially if   hemorrhoids are present.)  6. Vomiting.  7. Weakness or dizziness.  GO DIRECTLY TO THE NEAREST EMERGENCY ROOM IF YOU HAVE ANY OF THE FOLLOWING:      Difficulty breathing              Chills and/or fever over 101 F   Persistent vomiting and/or vomiting blood   Severe abdominal pain   Severe chest pain   Black, tarry stools   Bleeding- more than one  tablespoon   Any other symptom or condition that you feel may need urgent attention  Your doctor recommends these additional instructions:  If any biopsies were taken, your doctors clinic will contact you in 1 to 2   weeks with any results.  - Patient has a contact number available for emergencies.  The signs and   symptoms of potential delayed complications were discussed with the   patient.  Return to normal activities tomorrow.  Written discharge   instructions were provided to the patient.   - Discharge patient to home.   - Resume previous diet.   - Continue present medications.   - Await pathology results.   - Repeat colonoscopy in 2 years for surveillance.   For questions, problems or results please call your physician - Steven eKrr MD at Work:  [phone].  [facility], EMERGENCY ROOM PHONE NUMBER: [phone]  IF A COMPLICATION OR EMERGENCY SITUATION ARISES AND YOU ARE UNABLE TO REACH   YOUR PHYSICIAN - GO DIRECTLY TO THE EMERGENCY ROOM.  Steven Kerr MD  7/30/2020 2:35:37 PM  This report has been verified and signed electronically.  [employer]

## 2020-08-03 ENCOUNTER — LAB VISIT (OUTPATIENT)
Dept: LAB | Facility: HOSPITAL | Age: 40
End: 2020-08-03
Attending: INTERNAL MEDICINE
Payer: COMMERCIAL

## 2020-08-03 DIAGNOSIS — K50.113 CROHN'S DISEASE OF LARGE INTESTINE WITH FISTULA: ICD-10-CM

## 2020-08-03 LAB
FINAL PATHOLOGIC DIAGNOSIS: NORMAL
GROSS: NORMAL

## 2020-08-03 PROCEDURE — 36415 COLL VENOUS BLD VENIPUNCTURE: CPT

## 2020-08-03 PROCEDURE — 80145 DRUG ASSAY ADALIMUMAB: CPT

## 2020-08-10 ENCOUNTER — TELEPHONE (OUTPATIENT)
Dept: GASTROENTEROLOGY | Facility: CLINIC | Age: 40
End: 2020-08-10

## 2020-08-10 LAB — ADALIMUMAB CONCENTRATION & ANTI-ADALIMUMAB ANTIBODY: NORMAL

## 2020-08-10 NOTE — TELEPHONE ENCOUNTER
Spoke with patient , results given as written by Dr. Kerr  Pt verbalizes understadning and appreciates the call.  Thanks MA

## 2020-08-10 NOTE — TELEPHONE ENCOUNTER
----- Message from Steven Kerr MD sent at 8/10/2020 11:07 AM CDT -----  No antibodies found. Will consider treatment with Humira after  has cleared him.

## 2020-08-17 ENCOUNTER — OFFICE VISIT (OUTPATIENT)
Dept: SURGERY | Facility: CLINIC | Age: 40
End: 2020-08-17
Payer: COMMERCIAL

## 2020-08-17 VITALS
WEIGHT: 186.75 LBS | BODY MASS INDEX: 25.29 KG/M2 | HEIGHT: 72 IN | DIASTOLIC BLOOD PRESSURE: 91 MMHG | HEART RATE: 83 BPM | SYSTOLIC BLOOD PRESSURE: 137 MMHG

## 2020-08-17 DIAGNOSIS — K50.813 CROHN'S DISEASE OF BOTH SMALL AND LARGE INTESTINE WITH FISTULA: ICD-10-CM

## 2020-08-17 DIAGNOSIS — K60.3 ANAL FISTULA: Primary | ICD-10-CM

## 2020-08-17 PROCEDURE — 99024 PR POST-OP FOLLOW-UP VISIT: ICD-10-PCS | Mod: S$GLB,,, | Performed by: COLON & RECTAL SURGERY

## 2020-08-17 PROCEDURE — 99024 POSTOP FOLLOW-UP VISIT: CPT | Mod: S$GLB,,, | Performed by: COLON & RECTAL SURGERY

## 2020-08-17 PROCEDURE — 99999 PR PBB SHADOW E&M-EST. PATIENT-LVL III: ICD-10-PCS | Mod: PBBFAC,,, | Performed by: COLON & RECTAL SURGERY

## 2020-08-17 PROCEDURE — 99999 PR PBB SHADOW E&M-EST. PATIENT-LVL III: CPT | Mod: PBBFAC,,, | Performed by: COLON & RECTAL SURGERY

## 2020-08-17 NOTE — PROGRESS NOTES
"CRS Office Visit    SUBJECTIVE:     Chief Complaint: PO visit following fistulotomy    History of Present Illness:  41 yo M, with history of Crohn's disease and significant associated perianal pathology.  One month ago, the patient underwent a fistulotomy for a perianal fistula.  The patient reports that immediately after surgery he had some drainage from the wound that he characterized as "like pus", but that over the course of the last month, the drainage has become significantly less and thinner in nature.  He denies any feelings of fullness in his perineum that he has previously associated with abscess formation.  Denies abdominal pain.    He is not currently on any medication for Crohn's and reports that GI is awaiting clearance from the CRS team before initiating biologic therapy with Humira.      Review of patient's allergies indicates:   Allergen Reactions    Remicade [infliximab] Swelling     Throat swelled       Past Medical History:   Diagnosis Date    Allergy     seasonal    Chronic diarrhea     Crohn disease     DDD (degenerative disc disease), cervical     Joint pain     Keloid cicatrix     Ulcer     Ulcerative colitis      Past Surgical History:   Procedure Laterality Date    ABCESS DRAINAGE      APPENDECTOMY      COLON SURGERY  2017    Laparoscopic ileocolic resection    COLONOSCOPY N/A 12/5/2016    Procedure: COLONOSCOPY;  Surgeon: Steven Kerr MD;  [facility]  Service: Endoscopy;  Laterality: N/A;    COLONOSCOPY N/A 7/30/2020    Procedure: COLONOSCOPY;  Surgeon: Steven Kerr MD;  [facility]  Service: Endoscopy;  Laterality: N/A;  covid 7/27/20-[facility]-2nd floor-BB  instructions given to patient in person-BB.7/28 Called pt to come in the morning. Cannot come in early. EC    ESOPHAGOGASTRODUODENOSCOPY N/A 7/30/2020    Procedure: EGD (ESOPHAGOGASTRODUODENOSCOPY);  Surgeon: Steven Kerr MD;  [facility]  Service: " Endoscopy;  Laterality: N/A;  covid 7/27/20-[facility]-2nd floor-BB  instructions given to patient in person-BB    EXAMINATION UNDER ANESTHESIA N/A 7/17/2020    Procedure: EXAM UNDER ANESTHESIA;  Surgeon: Pete Diaz MD;  [facility];  Service: Colon and Rectal;  Laterality: N/A;    LAPAROSCOPIC RESECTION OF SMALL INTESTINE      RECTAL FISTULOTOMY N/A 7/17/2020    Procedure: FISTULOTOMY, RECTUM;  Surgeon: Pete Diaz MD;  [facility];  Service: Colon and Rectal;  Laterality: N/A;     Family History   Problem Relation Age of Onset    Arthritis Father     Cancer Maternal Grandmother     Melanoma Neg Hx     Psoriasis Neg Hx     Lupus Neg Hx     Eczema Neg Hx     Acne Neg Hx     Colon cancer Neg Hx     Rectal cancer Neg Hx     Stomach cancer Neg Hx     Crohn's disease Neg Hx     Esophageal cancer Neg Hx     Irritable bowel syndrome Neg Hx     Ulcerative colitis Neg Hx     Celiac disease Neg Hx      Social History     Tobacco Use    Smoking status: Never Smoker    Smokeless tobacco: Never Used   Substance Use Topics    Alcohol use: No    Drug use: No        Review of Systems:  Constitutional: no fever or chills  Eyes: no visual changes  ENT: no nasal congestion or sore throat  Respiratory: no cough or shortness of breath  Cardiovascular: no chest pain or palpitations  Gastrointestinal: no nausea or vomiting, tolerating diet  Genitourinary: no hematuria or dysuria  Integument/Breast: no rash or pruritis  Hematologic/Lymphatic: no easy bruising or lymphadenopathy  Musculoskeletal: no arthralgias or myalgias  Neurological: no seizures or tremors  Behavioral/Psych: no auditory or visual hallucinations    OBJECTIVE:     Vital Signs (Most Recent)  Pulse: 83 (08/17/20 1403)  BP: (!) 137/91 (08/17/20 1403)    Physical Exam:  General: White male in NAD sitting in chair in clinic  Neuro: aaox4 maex4 perrl  Respiratory: resps even unlabored  Cardiac: cap refill <2 sec  Abdomen:  Abdomen soft, nontender. BS normal. No masses, organomegaly or scars.  Extremities: Warm dry and intact  Anorectal: negative, no tenderness noted, previous fistulotomy site noted to be well-healed    ASSESSMENT/PLAN:     Patient with history of Crohn's Disease - s/p fistulotomy for perianal fistula  On exam, the patient's perianal region appears to have healed nicely  From our perspective, his perianal disease is under good control and he should be able to start back on biologic therapy when gastroenterology seen fit.  No need for regularly scheduled follow up with colorectal surgery at this time  Patient may follow up on an as needed basis      Steven Montes MD    I have interviewed and examined the patient, reviewed the notes and assessments, and/or personally supervised the procedure(s) performed by Dr. Montes, and I concur with her/his documentation of Escobar Toro.     Pete Diaz MD, FACS, FASCRS  Senior Staff Surgeon  Department of Colon & Rectal Surgery

## 2021-02-14 ENCOUNTER — PATIENT MESSAGE (OUTPATIENT)
Dept: SURGERY | Facility: CLINIC | Age: 41
End: 2021-02-14

## 2021-02-25 ENCOUNTER — PATIENT MESSAGE (OUTPATIENT)
Dept: SURGERY | Facility: CLINIC | Age: 41
End: 2021-02-25

## 2021-03-01 ENCOUNTER — OFFICE VISIT (OUTPATIENT)
Dept: SURGERY | Facility: CLINIC | Age: 41
End: 2021-03-01
Payer: COMMERCIAL

## 2021-03-01 ENCOUNTER — LAB VISIT (OUTPATIENT)
Dept: INTERNAL MEDICINE | Facility: CLINIC | Age: 41
End: 2021-03-01
Payer: COMMERCIAL

## 2021-03-01 ENCOUNTER — HOSPITAL ENCOUNTER (OUTPATIENT)
Dept: RADIOLOGY | Facility: HOSPITAL | Age: 41
Discharge: HOME | End: 2021-03-01
Attending: COLON & RECTAL SURGERY
Payer: COMMERCIAL

## 2021-03-01 VITALS
BODY MASS INDEX: 21.29 KG/M2 | HEART RATE: 133 BPM | DIASTOLIC BLOOD PRESSURE: 76 MMHG | WEIGHT: 157.19 LBS | HEIGHT: 72 IN | SYSTOLIC BLOOD PRESSURE: 126 MMHG

## 2021-03-01 DIAGNOSIS — K50.813 CROHN'S DISEASE OF BOTH SMALL AND LARGE INTESTINE WITH FISTULA: Primary | ICD-10-CM

## 2021-03-01 DIAGNOSIS — K61.2 ANORECTAL ABSCESS: ICD-10-CM

## 2021-03-01 DIAGNOSIS — K50.813 CROHN'S DISEASE OF BOTH SMALL AND LARGE INTESTINE WITH FISTULA: ICD-10-CM

## 2021-03-01 DIAGNOSIS — K50.80 CROHN'S DISEASE OF BOTH SMALL AND LARGE INTESTINE WITHOUT COMPLICATION: ICD-10-CM

## 2021-03-01 DIAGNOSIS — Z01.818 PRE-OP TESTING: ICD-10-CM

## 2021-03-01 DIAGNOSIS — K61.1 PERI-RECTAL ABSCESS: ICD-10-CM

## 2021-03-01 PROCEDURE — 72193 CT PELVIS W/DYE: CPT | Mod: TC

## 2021-03-01 PROCEDURE — 99999 PR PBB SHADOW E&M-EST. PATIENT-LVL III: ICD-10-PCS | Mod: PBBFAC,,, | Performed by: COLON & RECTAL SURGERY

## 2021-03-01 PROCEDURE — 1125F AMNT PAIN NOTED PAIN PRSNT: CPT | Mod: S$GLB,,, | Performed by: COLON & RECTAL SURGERY

## 2021-03-01 PROCEDURE — 3008F PR BODY MASS INDEX (BMI) DOCUMENTED: ICD-10-PCS | Mod: CPTII,S$GLB,, | Performed by: COLON & RECTAL SURGERY

## 2021-03-01 PROCEDURE — 72193 CT PELVIS WITH  CONTRAST: ICD-10-PCS | Mod: 26,,, | Performed by: RADIOLOGY

## 2021-03-01 PROCEDURE — 99999 PR PBB SHADOW E&M-EST. PATIENT-LVL III: CPT | Mod: PBBFAC,,, | Performed by: COLON & RECTAL SURGERY

## 2021-03-01 PROCEDURE — U0003 INFECTIOUS AGENT DETECTION BY NUCLEIC ACID (DNA OR RNA); SEVERE ACUTE RESPIRATORY SYNDROME CORONAVIRUS 2 (SARS-COV-2) (CORONAVIRUS DISEASE [COVID-19]), AMPLIFIED PROBE TECHNIQUE, MAKING USE OF HIGH THROUGHPUT TECHNOLOGIES AS DESCRIBED BY CMS-2020-01-R: HCPCS

## 2021-03-01 PROCEDURE — 99213 PR OFFICE/OUTPT VISIT, EST, LEVL III, 20-29 MIN: ICD-10-PCS | Mod: S$GLB,,, | Performed by: COLON & RECTAL SURGERY

## 2021-03-01 PROCEDURE — 99213 OFFICE O/P EST LOW 20 MIN: CPT | Mod: S$GLB,,, | Performed by: COLON & RECTAL SURGERY

## 2021-03-01 PROCEDURE — 1125F PR PAIN SEVERITY QUANTIFIED, PAIN PRESENT: ICD-10-PCS | Mod: S$GLB,,, | Performed by: COLON & RECTAL SURGERY

## 2021-03-01 PROCEDURE — 25500020 PHARM REV CODE 255: Performed by: COLON & RECTAL SURGERY

## 2021-03-01 PROCEDURE — U0005 INFEC AGEN DETEC AMPLI PROBE: HCPCS

## 2021-03-01 PROCEDURE — 72193 CT PELVIS W/DYE: CPT | Mod: 26,,, | Performed by: RADIOLOGY

## 2021-03-01 PROCEDURE — 3008F BODY MASS INDEX DOCD: CPT | Mod: CPTII,S$GLB,, | Performed by: COLON & RECTAL SURGERY

## 2021-03-01 RX ORDER — CIPROFLOXACIN 500 MG/1
500 TABLET ORAL EVERY 12 HOURS
Qty: 20 TABLET | Refills: 0 | Status: SHIPPED | OUTPATIENT
Start: 2021-03-01 | End: 2021-03-12 | Stop reason: SDUPTHER

## 2021-03-01 RX ORDER — METRONIDAZOLE 500 MG/1
500 TABLET ORAL EVERY 8 HOURS
Qty: 30 TABLET | Refills: 0 | Status: SHIPPED | OUTPATIENT
Start: 2021-03-01 | End: 2021-03-12 | Stop reason: SDUPTHER

## 2021-03-01 RX ADMIN — IOHEXOL 15 ML: 350 INJECTION, SOLUTION INTRAVENOUS at 02:03

## 2021-03-01 RX ADMIN — IOHEXOL 15 ML: 350 INJECTION, SOLUTION INTRAVENOUS at 03:03

## 2021-03-01 RX ADMIN — IOHEXOL 75 ML: 350 INJECTION, SOLUTION INTRAVENOUS at 04:03

## 2021-03-02 ENCOUNTER — ANESTHESIA EVENT (OUTPATIENT)
Dept: SURGERY | Facility: HOSPITAL | Age: 41
End: 2021-03-02
Payer: COMMERCIAL

## 2021-03-02 ENCOUNTER — TELEPHONE (OUTPATIENT)
Dept: SURGERY | Facility: CLINIC | Age: 41
End: 2021-03-02

## 2021-03-02 LAB — SARS-COV-2 RNA RESP QL NAA+PROBE: NOT DETECTED

## 2021-03-03 ENCOUNTER — HOSPITAL ENCOUNTER (OUTPATIENT)
Facility: HOSPITAL | Age: 41
Discharge: HOME | End: 2021-03-03
Attending: COLON & RECTAL SURGERY | Admitting: COLON & RECTAL SURGERY
Payer: COMMERCIAL

## 2021-03-03 ENCOUNTER — ANESTHESIA (OUTPATIENT)
Dept: SURGERY | Facility: HOSPITAL | Age: 41
End: 2021-03-03
Payer: COMMERCIAL

## 2021-03-03 VITALS
HEIGHT: 72 IN | OXYGEN SATURATION: 99 % | HEART RATE: 92 BPM | WEIGHT: 155 LBS | BODY MASS INDEX: 20.99 KG/M2 | RESPIRATION RATE: 15 BRPM | DIASTOLIC BLOOD PRESSURE: 64 MMHG | SYSTOLIC BLOOD PRESSURE: 125 MMHG | TEMPERATURE: 98 F

## 2021-03-03 DIAGNOSIS — K61.0 PERIANAL ABSCESS: Primary | ICD-10-CM

## 2021-03-03 DIAGNOSIS — K50.80 CROHN'S DISEASE OF BOTH SMALL AND LARGE INTESTINE WITHOUT COMPLICATION: ICD-10-CM

## 2021-03-03 PROCEDURE — 37000009 HC ANESTHESIA EA ADD 15 MINS: Performed by: COLON & RECTAL SURGERY

## 2021-03-03 PROCEDURE — 46050 PR I&D PERIANAL ABSCESS,SUPERFICIAL: ICD-10-PCS | Mod: ,,, | Performed by: COLON & RECTAL SURGERY

## 2021-03-03 PROCEDURE — D9220A PRA ANESTHESIA: ICD-10-PCS | Mod: ,,, | Performed by: ANESTHESIOLOGY

## 2021-03-03 PROCEDURE — 37000008 HC ANESTHESIA 1ST 15 MINUTES: Performed by: COLON & RECTAL SURGERY

## 2021-03-03 PROCEDURE — 36000705 HC OR TIME LEV I EA ADD 15 MIN: Performed by: COLON & RECTAL SURGERY

## 2021-03-03 PROCEDURE — D9220A PRA ANESTHESIA: Mod: ,,, | Performed by: ANESTHESIOLOGY

## 2021-03-03 PROCEDURE — 46050 I&D PERIANAL ABSCESS SUPFC: CPT | Mod: ,,, | Performed by: COLON & RECTAL SURGERY

## 2021-03-03 PROCEDURE — 63600175 PHARM REV CODE 636 W HCPCS: Performed by: STUDENT IN AN ORGANIZED HEALTH CARE EDUCATION/TRAINING PROGRAM

## 2021-03-03 PROCEDURE — 71000044 HC DOSC ROUTINE RECOVERY FIRST HOUR: Performed by: COLON & RECTAL SURGERY

## 2021-03-03 PROCEDURE — D9220A PRA ANESTHESIA: Mod: ,,, | Performed by: STUDENT IN AN ORGANIZED HEALTH CARE EDUCATION/TRAINING PROGRAM

## 2021-03-03 PROCEDURE — D9220A PRA ANESTHESIA: ICD-10-PCS | Mod: ,,, | Performed by: STUDENT IN AN ORGANIZED HEALTH CARE EDUCATION/TRAINING PROGRAM

## 2021-03-03 PROCEDURE — 25000003 PHARM REV CODE 250: Performed by: NURSE PRACTITIONER

## 2021-03-03 PROCEDURE — 46020 PLACEMENT OF SETON: CPT | Mod: 51,,, | Performed by: COLON & RECTAL SURGERY

## 2021-03-03 PROCEDURE — 25000003 PHARM REV CODE 250: Performed by: COLON & RECTAL SURGERY

## 2021-03-03 PROCEDURE — 25000003 PHARM REV CODE 250: Performed by: STUDENT IN AN ORGANIZED HEALTH CARE EDUCATION/TRAINING PROGRAM

## 2021-03-03 PROCEDURE — 46020 PR PLACEMENT,SETON: ICD-10-PCS | Mod: 51,,, | Performed by: COLON & RECTAL SURGERY

## 2021-03-03 PROCEDURE — 36000704 HC OR TIME LEV I 1ST 15 MIN: Performed by: COLON & RECTAL SURGERY

## 2021-03-03 PROCEDURE — 71000015 HC POSTOP RECOV 1ST HR: Performed by: COLON & RECTAL SURGERY

## 2021-03-03 RX ORDER — FENTANYL CITRATE 50 UG/ML
INJECTION, SOLUTION INTRAMUSCULAR; INTRAVENOUS
Status: DISCONTINUED | OUTPATIENT
Start: 2021-03-03 | End: 2021-03-03

## 2021-03-03 RX ORDER — SODIUM CHLORIDE 9 MG/ML
INJECTION, SOLUTION INTRAVENOUS CONTINUOUS
Status: ACTIVE | OUTPATIENT
Start: 2021-03-03

## 2021-03-03 RX ORDER — OXYCODONE HYDROCHLORIDE 5 MG/1
5 TABLET ORAL EVERY 4 HOURS PRN
Status: DISCONTINUED | OUTPATIENT
Start: 2021-03-03 | End: 2021-03-03 | Stop reason: HOSPADM

## 2021-03-03 RX ORDER — ACETAMINOPHEN 10 MG/ML
INJECTION, SOLUTION INTRAVENOUS
Status: DISCONTINUED | OUTPATIENT
Start: 2021-03-03 | End: 2021-03-03

## 2021-03-03 RX ORDER — ONDANSETRON 2 MG/ML
4 INJECTION INTRAMUSCULAR; INTRAVENOUS ONCE AS NEEDED
Status: DISCONTINUED | OUTPATIENT
Start: 2021-03-03 | End: 2021-03-03 | Stop reason: HOSPADM

## 2021-03-03 RX ORDER — MUPIROCIN 20 MG/G
OINTMENT TOPICAL
Status: DISPENSED | OUTPATIENT
Start: 2021-03-03

## 2021-03-03 RX ORDER — KETAMINE HCL IN 0.9 % NACL 50 MG/5 ML
SYRINGE (ML) INTRAVENOUS
Status: DISCONTINUED | OUTPATIENT
Start: 2021-03-03 | End: 2021-03-03

## 2021-03-03 RX ORDER — PROPOFOL 10 MG/ML
VIAL (ML) INTRAVENOUS
Status: DISCONTINUED | OUTPATIENT
Start: 2021-03-03 | End: 2021-03-03

## 2021-03-03 RX ORDER — ACETAMINOPHEN 500 MG
1000 TABLET ORAL EVERY 6 HOURS PRN
Qty: 60 TABLET | Refills: 0 | Status: SHIPPED | OUTPATIENT
Start: 2021-03-03 | End: 2021-03-29

## 2021-03-03 RX ORDER — SODIUM CHLORIDE 0.9 % (FLUSH) 0.9 %
3 SYRINGE (ML) INJECTION
Status: DISCONTINUED | OUTPATIENT
Start: 2021-03-03 | End: 2021-03-03 | Stop reason: HOSPADM

## 2021-03-03 RX ORDER — MIDAZOLAM HYDROCHLORIDE 1 MG/ML
INJECTION, SOLUTION INTRAMUSCULAR; INTRAVENOUS
Status: DISCONTINUED | OUTPATIENT
Start: 2021-03-03 | End: 2021-03-03

## 2021-03-03 RX ORDER — OXYCODONE HYDROCHLORIDE 5 MG/1
5 TABLET ORAL EVERY 4 HOURS PRN
Qty: 30 TABLET | Refills: 0 | Status: SHIPPED | OUTPATIENT
Start: 2021-03-03 | End: 2021-03-29

## 2021-03-03 RX ORDER — ROCURONIUM BROMIDE 10 MG/ML
INJECTION, SOLUTION INTRAVENOUS
Status: DISCONTINUED | OUTPATIENT
Start: 2021-03-03 | End: 2021-03-03

## 2021-03-03 RX ORDER — LIDOCAINE HYDROCHLORIDE 20 MG/ML
INJECTION INTRAVENOUS
Status: DISCONTINUED | OUTPATIENT
Start: 2021-03-03 | End: 2021-03-03

## 2021-03-03 RX ADMIN — FENTANYL CITRATE 50 MCG: 50 INJECTION, SOLUTION INTRAMUSCULAR; INTRAVENOUS at 08:03

## 2021-03-03 RX ADMIN — ROCURONIUM BROMIDE 50 MG: 10 INJECTION, SOLUTION INTRAVENOUS at 08:03

## 2021-03-03 RX ADMIN — MUPIROCIN: 20 OINTMENT TOPICAL at 07:03

## 2021-03-03 RX ADMIN — MIDAZOLAM HYDROCHLORIDE 2 MG: 1 INJECTION, SOLUTION INTRAMUSCULAR; INTRAVENOUS at 07:03

## 2021-03-03 RX ADMIN — Medication 10 MG: at 08:03

## 2021-03-03 RX ADMIN — SODIUM CHLORIDE: 0.9 INJECTION, SOLUTION INTRAVENOUS at 08:03

## 2021-03-03 RX ADMIN — PROPOFOL 200 MG: 10 INJECTION, EMULSION INTRAVENOUS at 08:03

## 2021-03-03 RX ADMIN — Medication 20 MG: at 08:03

## 2021-03-03 RX ADMIN — FENTANYL CITRATE 100 MCG: 50 INJECTION, SOLUTION INTRAMUSCULAR; INTRAVENOUS at 08:03

## 2021-03-03 RX ADMIN — ACETAMINOPHEN 1000 MG: 10 INJECTION, SOLUTION INTRAVENOUS at 08:03

## 2021-03-03 RX ADMIN — LIDOCAINE HYDROCHLORIDE 100 MG: 20 INJECTION, SOLUTION INTRAVENOUS at 08:03

## 2021-03-03 RX ADMIN — SUGAMMADEX 200 MG: 100 INJECTION, SOLUTION INTRAVENOUS at 09:03

## 2021-03-09 ENCOUNTER — PATIENT MESSAGE (OUTPATIENT)
Dept: SURGERY | Facility: CLINIC | Age: 41
End: 2021-03-09

## 2021-03-09 ENCOUNTER — TELEPHONE (OUTPATIENT)
Dept: SURGERY | Facility: CLINIC | Age: 41
End: 2021-03-09

## 2021-03-12 ENCOUNTER — TELEPHONE (OUTPATIENT)
Dept: SURGERY | Facility: CLINIC | Age: 41
End: 2021-03-12

## 2021-03-12 DIAGNOSIS — K61.1 PERI-RECTAL ABSCESS: ICD-10-CM

## 2021-03-12 DIAGNOSIS — K50.813 CROHN'S DISEASE OF BOTH SMALL AND LARGE INTESTINE WITH FISTULA: ICD-10-CM

## 2021-03-12 RX ORDER — CIPROFLOXACIN 500 MG/1
500 TABLET ORAL EVERY 12 HOURS
Qty: 20 TABLET | Refills: 0 | Status: SHIPPED | OUTPATIENT
Start: 2021-03-12 | End: 2021-03-23

## 2021-03-12 RX ORDER — METRONIDAZOLE 500 MG/1
500 TABLET ORAL EVERY 8 HOURS
Qty: 30 TABLET | Refills: 0 | Status: SHIPPED | OUTPATIENT
Start: 2021-03-12 | End: 2021-03-23

## 2021-03-15 ENCOUNTER — OFFICE VISIT (OUTPATIENT)
Dept: SURGERY | Facility: CLINIC | Age: 41
End: 2021-03-15
Payer: COMMERCIAL

## 2021-03-15 VITALS
HEART RATE: 114 BPM | DIASTOLIC BLOOD PRESSURE: 61 MMHG | BODY MASS INDEX: 19.84 KG/M2 | WEIGHT: 149.69 LBS | SYSTOLIC BLOOD PRESSURE: 93 MMHG | HEIGHT: 73 IN

## 2021-03-15 DIAGNOSIS — K50.813 CROHN'S DISEASE OF BOTH SMALL AND LARGE INTESTINE WITH FISTULA: Primary | ICD-10-CM

## 2021-03-15 DIAGNOSIS — K61.1 PERI-RECTAL ABSCESS: ICD-10-CM

## 2021-03-15 DIAGNOSIS — K60.3 ANAL FISTULA: ICD-10-CM

## 2021-03-15 PROCEDURE — 1125F AMNT PAIN NOTED PAIN PRSNT: CPT | Mod: S$GLB,,, | Performed by: COLON & RECTAL SURGERY

## 2021-03-15 PROCEDURE — 3008F PR BODY MASS INDEX (BMI) DOCUMENTED: ICD-10-PCS | Mod: CPTII,S$GLB,, | Performed by: COLON & RECTAL SURGERY

## 2021-03-15 PROCEDURE — 99024 PR POST-OP FOLLOW-UP VISIT: ICD-10-PCS | Mod: S$GLB,,, | Performed by: COLON & RECTAL SURGERY

## 2021-03-15 PROCEDURE — 1125F PR PAIN SEVERITY QUANTIFIED, PAIN PRESENT: ICD-10-PCS | Mod: S$GLB,,, | Performed by: COLON & RECTAL SURGERY

## 2021-03-15 PROCEDURE — 3008F BODY MASS INDEX DOCD: CPT | Mod: CPTII,S$GLB,, | Performed by: COLON & RECTAL SURGERY

## 2021-03-15 PROCEDURE — 99999 PR PBB SHADOW E&M-EST. PATIENT-LVL III: ICD-10-PCS | Mod: PBBFAC,,, | Performed by: COLON & RECTAL SURGERY

## 2021-03-15 PROCEDURE — 99024 POSTOP FOLLOW-UP VISIT: CPT | Mod: S$GLB,,, | Performed by: COLON & RECTAL SURGERY

## 2021-03-15 PROCEDURE — 99999 PR PBB SHADOW E&M-EST. PATIENT-LVL III: CPT | Mod: PBBFAC,,, | Performed by: COLON & RECTAL SURGERY

## 2021-03-22 PROCEDURE — 99358 PROLONG SERVICE W/O CONTACT: CPT | Mod: ,,, | Performed by: INTERNAL MEDICINE

## 2021-03-22 PROCEDURE — 99358 PR PROLONGED SERV,NO CONTACT,1ST HR: ICD-10-PCS | Mod: ,,, | Performed by: INTERNAL MEDICINE

## 2021-03-23 ENCOUNTER — OFFICE VISIT (OUTPATIENT)
Dept: GASTROENTEROLOGY | Facility: CLINIC | Age: 41
End: 2021-03-23
Payer: COMMERCIAL

## 2021-03-23 ENCOUNTER — PATIENT MESSAGE (OUTPATIENT)
Dept: ADMINISTRATIVE | Facility: OTHER | Age: 41
End: 2021-03-23

## 2021-03-23 DIAGNOSIS — K50.813 CROHN'S DISEASE OF BOTH SMALL AND LARGE INTESTINE WITH FISTULA: ICD-10-CM

## 2021-03-23 DIAGNOSIS — K61.0 PERIANAL ABSCESS: Primary | ICD-10-CM

## 2021-03-23 PROCEDURE — 99214 PR OFFICE/OUTPT VISIT, EST, LEVL IV, 30-39 MIN: ICD-10-PCS | Mod: 95,,, | Performed by: INTERNAL MEDICINE

## 2021-03-23 PROCEDURE — 1126F PR PAIN SEVERITY QUANTIFIED, NO PAIN PRESENT: ICD-10-PCS | Mod: ,,, | Performed by: INTERNAL MEDICINE

## 2021-03-23 PROCEDURE — 1126F AMNT PAIN NOTED NONE PRSNT: CPT | Mod: ,,, | Performed by: INTERNAL MEDICINE

## 2021-03-23 PROCEDURE — 99214 OFFICE O/P EST MOD 30 MIN: CPT | Mod: 95,,, | Performed by: INTERNAL MEDICINE

## 2021-03-24 ENCOUNTER — LAB VISIT (OUTPATIENT)
Dept: LAB | Facility: HOSPITAL | Age: 41
End: 2021-03-24
Attending: INTERNAL MEDICINE
Payer: COMMERCIAL

## 2021-03-24 DIAGNOSIS — K50.813 CROHN'S DISEASE OF BOTH SMALL AND LARGE INTESTINE WITH FISTULA: ICD-10-CM

## 2021-03-24 LAB
25(OH)D3+25(OH)D2 SERPL-MCNC: 48 NG/ML (ref 30–96)
CRP SERPL-MCNC: 0.6 MG/L (ref 0–8.2)
VARICELLA INTERPRETATION: POSITIVE
VARICELLA ZOSTER IGG: 1.75 ISR (ref 0–0.9)
VIT B12 SERPL-MCNC: 734 PG/ML (ref 210–950)

## 2021-03-24 PROCEDURE — 86790 VIRUS ANTIBODY NOS: CPT | Performed by: INTERNAL MEDICINE

## 2021-03-24 PROCEDURE — 86706 HEP B SURFACE ANTIBODY: CPT | Performed by: INTERNAL MEDICINE

## 2021-03-24 PROCEDURE — 86704 HEP B CORE ANTIBODY TOTAL: CPT | Performed by: INTERNAL MEDICINE

## 2021-03-24 PROCEDURE — 82607 VITAMIN B-12: CPT | Performed by: INTERNAL MEDICINE

## 2021-03-24 PROCEDURE — 86787 VARICELLA-ZOSTER ANTIBODY: CPT | Performed by: INTERNAL MEDICINE

## 2021-03-24 PROCEDURE — 86703 HIV-1/HIV-2 1 RESULT ANTBDY: CPT | Performed by: INTERNAL MEDICINE

## 2021-03-24 PROCEDURE — 82306 VITAMIN D 25 HYDROXY: CPT | Performed by: INTERNAL MEDICINE

## 2021-03-24 PROCEDURE — 87340 HEPATITIS B SURFACE AG IA: CPT | Performed by: INTERNAL MEDICINE

## 2021-03-24 PROCEDURE — 82657 ENZYME CELL ACTIVITY: CPT | Performed by: INTERNAL MEDICINE

## 2021-03-24 PROCEDURE — 86140 C-REACTIVE PROTEIN: CPT | Performed by: INTERNAL MEDICINE

## 2021-03-24 PROCEDURE — 86803 HEPATITIS C AB TEST: CPT | Performed by: INTERNAL MEDICINE

## 2021-03-25 LAB
HBV CORE AB SERPL QL IA: NEGATIVE
HBV SURFACE AB SER-ACNC: NEGATIVE M[IU]/ML
HBV SURFACE AG SERPL QL IA: NEGATIVE
HCV AB SERPL QL IA: NEGATIVE
HEPATITIS A ANTIBODY, IGG: NEGATIVE
HIV 1+2 AB+HIV1 P24 AG SERPL QL IA: NEGATIVE

## 2021-03-26 ENCOUNTER — PATIENT MESSAGE (OUTPATIENT)
Dept: GASTROENTEROLOGY | Facility: CLINIC | Age: 41
End: 2021-03-26

## 2021-03-26 ENCOUNTER — SPECIALTY PHARMACY (OUTPATIENT)
Dept: PHARMACY | Facility: CLINIC | Age: 41
End: 2021-03-26

## 2021-03-28 ENCOUNTER — PATIENT MESSAGE (OUTPATIENT)
Dept: GASTROENTEROLOGY | Facility: CLINIC | Age: 41
End: 2021-03-28

## 2021-03-29 ENCOUNTER — OFFICE VISIT (OUTPATIENT)
Dept: SURGERY | Facility: CLINIC | Age: 41
End: 2021-03-29
Payer: COMMERCIAL

## 2021-03-29 ENCOUNTER — PATIENT MESSAGE (OUTPATIENT)
Dept: GASTROENTEROLOGY | Facility: HOSPITAL | Age: 41
End: 2021-03-29

## 2021-03-29 VITALS
HEART RATE: 89 BPM | WEIGHT: 152.13 LBS | SYSTOLIC BLOOD PRESSURE: 112 MMHG | DIASTOLIC BLOOD PRESSURE: 70 MMHG | BODY MASS INDEX: 20.6 KG/M2 | HEIGHT: 72 IN

## 2021-03-29 DIAGNOSIS — K61.1 PERI-RECTAL ABSCESS: Primary | ICD-10-CM

## 2021-03-29 DIAGNOSIS — K60.3 ANAL FISTULA: ICD-10-CM

## 2021-03-29 DIAGNOSIS — K50.813 CROHN'S DISEASE OF BOTH SMALL AND LARGE INTESTINE WITH FISTULA: Primary | ICD-10-CM

## 2021-03-29 DIAGNOSIS — K50.813 CROHN'S DISEASE OF BOTH SMALL AND LARGE INTESTINE WITH FISTULA: ICD-10-CM

## 2021-03-29 LAB
6-METHYLMERCAPTOPURINE RIBOSIDE: 3.46 NMOL/ML/H (ref 5.04–9.57)
6-METHYLMERCAPTOPURINE: 3.34 NMOL/ML/H (ref 3–6.66)
6-METHYLTHIOGUANINE RIBOSIDE: 3.53 NMOL/ML/H (ref 2.7–5.84)
TPMT INTERPRETATION: ABNORMAL
TPMT REVIEWED BY: ABNORMAL

## 2021-03-29 PROCEDURE — 99999 PR PBB SHADOW E&M-EST. PATIENT-LVL III: ICD-10-PCS | Mod: PBBFAC,,, | Performed by: COLON & RECTAL SURGERY

## 2021-03-29 PROCEDURE — 99024 POSTOP FOLLOW-UP VISIT: CPT | Mod: S$GLB,,, | Performed by: COLON & RECTAL SURGERY

## 2021-03-29 PROCEDURE — 3008F BODY MASS INDEX DOCD: CPT | Mod: CPTII,S$GLB,, | Performed by: COLON & RECTAL SURGERY

## 2021-03-29 PROCEDURE — 3008F PR BODY MASS INDEX (BMI) DOCUMENTED: ICD-10-PCS | Mod: CPTII,S$GLB,, | Performed by: COLON & RECTAL SURGERY

## 2021-03-29 PROCEDURE — 1126F PR PAIN SEVERITY QUANTIFIED, NO PAIN PRESENT: ICD-10-PCS | Mod: S$GLB,,, | Performed by: COLON & RECTAL SURGERY

## 2021-03-29 PROCEDURE — 1126F AMNT PAIN NOTED NONE PRSNT: CPT | Mod: S$GLB,,, | Performed by: COLON & RECTAL SURGERY

## 2021-03-29 PROCEDURE — 99999 PR PBB SHADOW E&M-EST. PATIENT-LVL III: CPT | Mod: PBBFAC,,, | Performed by: COLON & RECTAL SURGERY

## 2021-03-29 PROCEDURE — 99024 PR POST-OP FOLLOW-UP VISIT: ICD-10-PCS | Mod: S$GLB,,, | Performed by: COLON & RECTAL SURGERY

## 2021-03-29 RX ORDER — AZATHIOPRINE 50 MG/1
100 TABLET ORAL DAILY
Qty: 60 TABLET | Refills: 5 | Status: SHIPPED | OUTPATIENT
Start: 2021-03-29 | End: 2021-10-22

## 2021-03-30 ENCOUNTER — TELEPHONE (OUTPATIENT)
Dept: SURGERY | Facility: CLINIC | Age: 41
End: 2021-03-30

## 2021-03-30 ENCOUNTER — PATIENT MESSAGE (OUTPATIENT)
Dept: GASTROENTEROLOGY | Facility: CLINIC | Age: 41
End: 2021-03-30

## 2021-04-05 ENCOUNTER — PATIENT MESSAGE (OUTPATIENT)
Dept: GASTROENTEROLOGY | Facility: CLINIC | Age: 41
End: 2021-04-05

## 2021-04-08 ENCOUNTER — PATIENT MESSAGE (OUTPATIENT)
Dept: GASTROENTEROLOGY | Facility: CLINIC | Age: 41
End: 2021-04-08

## 2021-04-09 ENCOUNTER — PATIENT MESSAGE (OUTPATIENT)
Dept: SURGERY | Facility: CLINIC | Age: 41
End: 2021-04-09

## 2021-04-16 ENCOUNTER — LAB VISIT (OUTPATIENT)
Dept: LAB | Facility: HOSPITAL | Age: 41
End: 2021-04-16
Payer: COMMERCIAL

## 2021-04-16 DIAGNOSIS — K50.813 CROHN'S DISEASE OF BOTH SMALL AND LARGE INTESTINE WITH FISTULA: ICD-10-CM

## 2021-04-16 LAB
ALBUMIN SERPL BCP-MCNC: 3.9 G/DL (ref 3.5–5.2)
ALP SERPL-CCNC: 70 U/L (ref 55–135)
ALT SERPL W/O P-5'-P-CCNC: 29 U/L (ref 10–44)
ANION GAP SERPL CALC-SCNC: 6 MMOL/L (ref 8–16)
AST SERPL-CCNC: 22 U/L (ref 10–40)
BASOPHILS # BLD AUTO: 0.03 K/UL (ref 0–0.2)
BASOPHILS NFR BLD: 0.8 % (ref 0–1.9)
BILIRUB SERPL-MCNC: 1.2 MG/DL (ref 0.1–1)
BUN SERPL-MCNC: 23 MG/DL (ref 6–20)
CALCIUM SERPL-MCNC: 9.5 MG/DL (ref 8.7–10.5)
CHLORIDE SERPL-SCNC: 107 MMOL/L (ref 95–110)
CO2 SERPL-SCNC: 28 MMOL/L (ref 23–29)
CREAT SERPL-MCNC: 1 MG/DL (ref 0.5–1.4)
DIFFERENTIAL METHOD: ABNORMAL
EOSINOPHIL # BLD AUTO: 0.1 K/UL (ref 0–0.5)
EOSINOPHIL NFR BLD: 3.7 % (ref 0–8)
ERYTHROCYTE [DISTWIDTH] IN BLOOD BY AUTOMATED COUNT: 12.2 % (ref 11.5–14.5)
EST. GFR  (AFRICAN AMERICAN): >60 ML/MIN/1.73 M^2
EST. GFR  (NON AFRICAN AMERICAN): >60 ML/MIN/1.73 M^2
GLUCOSE SERPL-MCNC: 91 MG/DL (ref 70–110)
HCT VFR BLD AUTO: 38.9 % (ref 40–54)
HGB BLD-MCNC: 12.6 G/DL (ref 14–18)
IMM GRANULOCYTES # BLD AUTO: 0 K/UL (ref 0–0.04)
IMM GRANULOCYTES NFR BLD AUTO: 0 % (ref 0–0.5)
LYMPHOCYTES # BLD AUTO: 1.3 K/UL (ref 1–4.8)
LYMPHOCYTES NFR BLD: 36 % (ref 18–48)
MCH RBC QN AUTO: 30.9 PG (ref 27–31)
MCHC RBC AUTO-ENTMCNC: 32.4 G/DL (ref 32–36)
MCV RBC AUTO: 95 FL (ref 82–98)
MONOCYTES # BLD AUTO: 0.5 K/UL (ref 0.3–1)
MONOCYTES NFR BLD: 13.3 % (ref 4–15)
NEUTROPHILS # BLD AUTO: 1.6 K/UL (ref 1.8–7.7)
NEUTROPHILS NFR BLD: 46.2 % (ref 38–73)
NRBC BLD-RTO: 0 /100 WBC
PLATELET # BLD AUTO: 250 K/UL (ref 150–450)
PMV BLD AUTO: 10.9 FL (ref 9.2–12.9)
POTASSIUM SERPL-SCNC: 4.3 MMOL/L (ref 3.5–5.1)
PROT SERPL-MCNC: 7.8 G/DL (ref 6–8.4)
RBC # BLD AUTO: 4.08 M/UL (ref 4.6–6.2)
SODIUM SERPL-SCNC: 141 MMOL/L (ref 136–145)
WBC # BLD AUTO: 3.53 K/UL (ref 3.9–12.7)

## 2021-04-16 PROCEDURE — 85025 COMPLETE CBC W/AUTO DIFF WBC: CPT | Performed by: INTERNAL MEDICINE

## 2021-04-16 PROCEDURE — 80053 COMPREHEN METABOLIC PANEL: CPT | Performed by: INTERNAL MEDICINE

## 2021-04-16 PROCEDURE — 36415 COLL VENOUS BLD VENIPUNCTURE: CPT | Performed by: INTERNAL MEDICINE

## 2021-04-20 ENCOUNTER — PATIENT MESSAGE (OUTPATIENT)
Dept: GASTROENTEROLOGY | Facility: HOSPITAL | Age: 41
End: 2021-04-20

## 2021-04-20 DIAGNOSIS — K50.813 CROHN'S DISEASE OF BOTH SMALL AND LARGE INTESTINE WITH FISTULA: Primary | ICD-10-CM

## 2021-04-30 ENCOUNTER — LAB VISIT (OUTPATIENT)
Dept: LAB | Facility: HOSPITAL | Age: 41
End: 2021-04-30
Attending: INTERNAL MEDICINE
Payer: COMMERCIAL

## 2021-04-30 DIAGNOSIS — K50.813 CROHN'S DISEASE OF BOTH SMALL AND LARGE INTESTINE WITH FISTULA: ICD-10-CM

## 2021-04-30 LAB
ALBUMIN SERPL BCP-MCNC: 3.8 G/DL (ref 3.5–5.2)
ALP SERPL-CCNC: 72 U/L (ref 55–135)
ALT SERPL W/O P-5'-P-CCNC: 14 U/L (ref 10–44)
ANION GAP SERPL CALC-SCNC: 9 MMOL/L (ref 8–16)
AST SERPL-CCNC: 13 U/L (ref 10–40)
BASOPHILS # BLD AUTO: 0.03 K/UL (ref 0–0.2)
BASOPHILS NFR BLD: 0.5 % (ref 0–1.9)
BILIRUB SERPL-MCNC: 0.8 MG/DL (ref 0.1–1)
BUN SERPL-MCNC: 21 MG/DL (ref 6–20)
CALCIUM SERPL-MCNC: 8.9 MG/DL (ref 8.7–10.5)
CHLORIDE SERPL-SCNC: 107 MMOL/L (ref 95–110)
CO2 SERPL-SCNC: 24 MMOL/L (ref 23–29)
CREAT SERPL-MCNC: 1 MG/DL (ref 0.5–1.4)
DIFFERENTIAL METHOD: ABNORMAL
EOSINOPHIL # BLD AUTO: 0.2 K/UL (ref 0–0.5)
EOSINOPHIL NFR BLD: 3.2 % (ref 0–8)
ERYTHROCYTE [DISTWIDTH] IN BLOOD BY AUTOMATED COUNT: 12.3 % (ref 11.5–14.5)
EST. GFR  (AFRICAN AMERICAN): >60 ML/MIN/1.73 M^2
EST. GFR  (NON AFRICAN AMERICAN): >60 ML/MIN/1.73 M^2
GLUCOSE SERPL-MCNC: 94 MG/DL (ref 70–110)
HCT VFR BLD AUTO: 37.1 % (ref 40–54)
HGB BLD-MCNC: 12 G/DL (ref 14–18)
IMM GRANULOCYTES # BLD AUTO: 0.02 K/UL (ref 0–0.04)
IMM GRANULOCYTES NFR BLD AUTO: 0.3 % (ref 0–0.5)
LYMPHOCYTES # BLD AUTO: 1.2 K/UL (ref 1–4.8)
LYMPHOCYTES NFR BLD: 20.9 % (ref 18–48)
MCH RBC QN AUTO: 30.9 PG (ref 27–31)
MCHC RBC AUTO-ENTMCNC: 32.3 G/DL (ref 32–36)
MCV RBC AUTO: 96 FL (ref 82–98)
MONOCYTES # BLD AUTO: 0.6 K/UL (ref 0.3–1)
MONOCYTES NFR BLD: 10.8 % (ref 4–15)
NEUTROPHILS # BLD AUTO: 3.8 K/UL (ref 1.8–7.7)
NEUTROPHILS NFR BLD: 64.3 % (ref 38–73)
NRBC BLD-RTO: 0 /100 WBC
PLATELET # BLD AUTO: 218 K/UL (ref 150–450)
PMV BLD AUTO: 10.6 FL (ref 9.2–12.9)
POTASSIUM SERPL-SCNC: 4.1 MMOL/L (ref 3.5–5.1)
PROT SERPL-MCNC: 7.6 G/DL (ref 6–8.4)
RBC # BLD AUTO: 3.88 M/UL (ref 4.6–6.2)
SODIUM SERPL-SCNC: 140 MMOL/L (ref 136–145)
WBC # BLD AUTO: 5.85 K/UL (ref 3.9–12.7)

## 2021-04-30 PROCEDURE — 80053 COMPREHEN METABOLIC PANEL: CPT | Performed by: INTERNAL MEDICINE

## 2021-04-30 PROCEDURE — 85025 COMPLETE CBC W/AUTO DIFF WBC: CPT | Performed by: INTERNAL MEDICINE

## 2021-04-30 PROCEDURE — 36415 COLL VENOUS BLD VENIPUNCTURE: CPT | Performed by: INTERNAL MEDICINE

## 2021-05-03 ENCOUNTER — PATIENT MESSAGE (OUTPATIENT)
Dept: GASTROENTEROLOGY | Facility: HOSPITAL | Age: 41
End: 2021-05-03

## 2021-05-03 DIAGNOSIS — K50.813 CROHN'S DISEASE OF BOTH SMALL AND LARGE INTESTINE WITH FISTULA: Primary | ICD-10-CM

## 2021-05-05 ENCOUNTER — LAB VISIT (OUTPATIENT)
Dept: LAB | Facility: HOSPITAL | Age: 41
End: 2021-05-05
Attending: INTERNAL MEDICINE
Payer: COMMERCIAL

## 2021-05-05 DIAGNOSIS — K50.813 CROHN'S DISEASE OF BOTH SMALL AND LARGE INTESTINE WITH FISTULA: ICD-10-CM

## 2021-05-05 PROCEDURE — 36415 COLL VENOUS BLD VENIPUNCTURE: CPT | Performed by: INTERNAL MEDICINE

## 2021-05-05 PROCEDURE — 80145 DRUG ASSAY ADALIMUMAB: CPT | Performed by: INTERNAL MEDICINE

## 2021-05-13 LAB — ADALIMUMAB CONCENTRATION & ANTI-ADALIMUMAB ANTIBODY: NORMAL

## 2021-05-14 ENCOUNTER — PATIENT MESSAGE (OUTPATIENT)
Dept: GASTROENTEROLOGY | Facility: CLINIC | Age: 41
End: 2021-05-14

## 2021-05-28 ENCOUNTER — PATIENT MESSAGE (OUTPATIENT)
Dept: GASTROENTEROLOGY | Facility: CLINIC | Age: 41
End: 2021-05-28

## 2021-05-28 ENCOUNTER — LAB VISIT (OUTPATIENT)
Dept: LAB | Facility: HOSPITAL | Age: 41
End: 2021-05-28
Attending: INTERNAL MEDICINE
Payer: COMMERCIAL

## 2021-05-28 ENCOUNTER — OFFICE VISIT (OUTPATIENT)
Dept: GASTROENTEROLOGY | Facility: CLINIC | Age: 41
End: 2021-05-28
Payer: COMMERCIAL

## 2021-05-28 VITALS
WEIGHT: 160.94 LBS | HEART RATE: 93 BPM | BODY MASS INDEX: 21.83 KG/M2 | SYSTOLIC BLOOD PRESSURE: 128 MMHG | TEMPERATURE: 99 F | DIASTOLIC BLOOD PRESSURE: 83 MMHG | OXYGEN SATURATION: 99 %

## 2021-05-28 DIAGNOSIS — K50.813 CROHN'S DISEASE OF BOTH SMALL AND LARGE INTESTINE WITH FISTULA: Primary | ICD-10-CM

## 2021-05-28 DIAGNOSIS — R05.9 COUGH: ICD-10-CM

## 2021-05-28 DIAGNOSIS — K50.813 CROHN'S DISEASE OF BOTH SMALL AND LARGE INTESTINE WITH FISTULA: ICD-10-CM

## 2021-05-28 DIAGNOSIS — K61.0 PERIANAL ABSCESS: ICD-10-CM

## 2021-05-28 DIAGNOSIS — R59.0 INGUINAL ADENOPATHY: ICD-10-CM

## 2021-05-28 LAB
ALBUMIN SERPL BCP-MCNC: 3.9 G/DL (ref 3.5–5.2)
ALP SERPL-CCNC: 83 U/L (ref 55–135)
ALT SERPL W/O P-5'-P-CCNC: 18 U/L (ref 10–44)
ANION GAP SERPL CALC-SCNC: 9 MMOL/L (ref 8–16)
AST SERPL-CCNC: 17 U/L (ref 10–40)
BASOPHILS # BLD AUTO: 0.06 K/UL (ref 0–0.2)
BASOPHILS NFR BLD: 1.2 % (ref 0–1.9)
BILIRUB SERPL-MCNC: 0.8 MG/DL (ref 0.1–1)
BUN SERPL-MCNC: 25 MG/DL (ref 6–20)
CALCIUM SERPL-MCNC: 9.6 MG/DL (ref 8.7–10.5)
CHLORIDE SERPL-SCNC: 107 MMOL/L (ref 95–110)
CO2 SERPL-SCNC: 27 MMOL/L (ref 23–29)
CREAT SERPL-MCNC: 1.1 MG/DL (ref 0.5–1.4)
CRP SERPL-MCNC: 0.7 MG/L (ref 0–8.2)
DIFFERENTIAL METHOD: ABNORMAL
EOSINOPHIL # BLD AUTO: 0.3 K/UL (ref 0–0.5)
EOSINOPHIL NFR BLD: 6.3 % (ref 0–8)
ERYTHROCYTE [DISTWIDTH] IN BLOOD BY AUTOMATED COUNT: 12.1 % (ref 11.5–14.5)
EST. GFR  (AFRICAN AMERICAN): >60 ML/MIN/1.73 M^2
EST. GFR  (NON AFRICAN AMERICAN): >60 ML/MIN/1.73 M^2
GLUCOSE SERPL-MCNC: 99 MG/DL (ref 70–110)
HCT VFR BLD AUTO: 38.5 % (ref 40–54)
HGB BLD-MCNC: 12.5 G/DL (ref 14–18)
IMM GRANULOCYTES # BLD AUTO: 0.02 K/UL (ref 0–0.04)
IMM GRANULOCYTES NFR BLD AUTO: 0.4 % (ref 0–0.5)
LYMPHOCYTES # BLD AUTO: 1.6 K/UL (ref 1–4.8)
LYMPHOCYTES NFR BLD: 31.6 % (ref 18–48)
MCH RBC QN AUTO: 30.9 PG (ref 27–31)
MCHC RBC AUTO-ENTMCNC: 32.5 G/DL (ref 32–36)
MCV RBC AUTO: 95 FL (ref 82–98)
MONOCYTES # BLD AUTO: 0.6 K/UL (ref 0.3–1)
MONOCYTES NFR BLD: 13 % (ref 4–15)
NEUTROPHILS # BLD AUTO: 2.4 K/UL (ref 1.8–7.7)
NEUTROPHILS NFR BLD: 47.5 % (ref 38–73)
NRBC BLD-RTO: 0 /100 WBC
PLATELET # BLD AUTO: 283 K/UL (ref 150–450)
PMV BLD AUTO: 10.3 FL (ref 9.2–12.9)
POTASSIUM SERPL-SCNC: 4.2 MMOL/L (ref 3.5–5.1)
PROT SERPL-MCNC: 8.2 G/DL (ref 6–8.4)
RBC # BLD AUTO: 4.05 M/UL (ref 4.6–6.2)
SODIUM SERPL-SCNC: 143 MMOL/L (ref 136–145)
WBC # BLD AUTO: 4.94 K/UL (ref 3.9–12.7)

## 2021-05-28 PROCEDURE — 1126F PR PAIN SEVERITY QUANTIFIED, NO PAIN PRESENT: ICD-10-PCS | Mod: S$GLB,,, | Performed by: INTERNAL MEDICINE

## 2021-05-28 PROCEDURE — 99214 PR OFFICE/OUTPT VISIT, EST, LEVL IV, 30-39 MIN: ICD-10-PCS | Mod: S$GLB,,, | Performed by: INTERNAL MEDICINE

## 2021-05-28 PROCEDURE — 3008F BODY MASS INDEX DOCD: CPT | Mod: CPTII,S$GLB,, | Performed by: INTERNAL MEDICINE

## 2021-05-28 PROCEDURE — 86140 C-REACTIVE PROTEIN: CPT | Performed by: INTERNAL MEDICINE

## 2021-05-28 PROCEDURE — 80053 COMPREHEN METABOLIC PANEL: CPT | Performed by: INTERNAL MEDICINE

## 2021-05-28 PROCEDURE — 3008F PR BODY MASS INDEX (BMI) DOCUMENTED: ICD-10-PCS | Mod: CPTII,S$GLB,, | Performed by: INTERNAL MEDICINE

## 2021-05-28 PROCEDURE — 36415 COLL VENOUS BLD VENIPUNCTURE: CPT | Performed by: INTERNAL MEDICINE

## 2021-05-28 PROCEDURE — 1126F AMNT PAIN NOTED NONE PRSNT: CPT | Mod: S$GLB,,, | Performed by: INTERNAL MEDICINE

## 2021-05-28 PROCEDURE — 99214 OFFICE O/P EST MOD 30 MIN: CPT | Mod: S$GLB,,, | Performed by: INTERNAL MEDICINE

## 2021-05-28 PROCEDURE — 85025 COMPLETE CBC W/AUTO DIFF WBC: CPT | Performed by: INTERNAL MEDICINE

## 2021-05-28 RX ORDER — ADALIMUMAB 40MG/0.4ML
KIT SUBCUTANEOUS
COMMUNITY
Start: 2021-05-24 | End: 2021-12-17

## 2021-07-19 ENCOUNTER — OFFICE VISIT (OUTPATIENT)
Dept: SURGERY | Facility: CLINIC | Age: 41
End: 2021-07-19
Payer: COMMERCIAL

## 2021-07-19 VITALS
WEIGHT: 155.88 LBS | DIASTOLIC BLOOD PRESSURE: 77 MMHG | HEART RATE: 108 BPM | BODY MASS INDEX: 21.11 KG/M2 | HEIGHT: 72 IN | SYSTOLIC BLOOD PRESSURE: 115 MMHG

## 2021-07-19 DIAGNOSIS — K50.813 CROHN'S DISEASE OF BOTH SMALL AND LARGE INTESTINE WITH FISTULA: ICD-10-CM

## 2021-07-19 DIAGNOSIS — K60.3 ANAL FISTULA: Primary | ICD-10-CM

## 2021-07-19 PROCEDURE — 99213 OFFICE O/P EST LOW 20 MIN: CPT | Mod: S$GLB,,, | Performed by: COLON & RECTAL SURGERY

## 2021-07-19 PROCEDURE — 99999 PR PBB SHADOW E&M-EST. PATIENT-LVL III: CPT | Mod: PBBFAC,,, | Performed by: COLON & RECTAL SURGERY

## 2021-07-19 PROCEDURE — 3008F PR BODY MASS INDEX (BMI) DOCUMENTED: ICD-10-PCS | Mod: CPTII,S$GLB,, | Performed by: COLON & RECTAL SURGERY

## 2021-07-19 PROCEDURE — 3074F PR MOST RECENT SYSTOLIC BLOOD PRESSURE < 130 MM HG: ICD-10-PCS | Mod: CPTII,S$GLB,, | Performed by: COLON & RECTAL SURGERY

## 2021-07-19 PROCEDURE — 3008F BODY MASS INDEX DOCD: CPT | Mod: CPTII,S$GLB,, | Performed by: COLON & RECTAL SURGERY

## 2021-07-19 PROCEDURE — 3074F SYST BP LT 130 MM HG: CPT | Mod: CPTII,S$GLB,, | Performed by: COLON & RECTAL SURGERY

## 2021-07-19 PROCEDURE — 1159F PR MEDICATION LIST DOCUMENTED IN MEDICAL RECORD: ICD-10-PCS | Mod: CPTII,S$GLB,, | Performed by: COLON & RECTAL SURGERY

## 2021-07-19 PROCEDURE — 1159F MED LIST DOCD IN RCRD: CPT | Mod: CPTII,S$GLB,, | Performed by: COLON & RECTAL SURGERY

## 2021-07-19 PROCEDURE — 1126F PR PAIN SEVERITY QUANTIFIED, NO PAIN PRESENT: ICD-10-PCS | Mod: CPTII,S$GLB,, | Performed by: COLON & RECTAL SURGERY

## 2021-07-19 PROCEDURE — 1160F RVW MEDS BY RX/DR IN RCRD: CPT | Mod: CPTII,S$GLB,, | Performed by: COLON & RECTAL SURGERY

## 2021-07-19 PROCEDURE — 1160F PR REVIEW ALL MEDS BY PRESCRIBER/CLIN PHARMACIST DOCUMENTED: ICD-10-PCS | Mod: CPTII,S$GLB,, | Performed by: COLON & RECTAL SURGERY

## 2021-07-19 PROCEDURE — 3078F PR MOST RECENT DIASTOLIC BLOOD PRESSURE < 80 MM HG: ICD-10-PCS | Mod: CPTII,S$GLB,, | Performed by: COLON & RECTAL SURGERY

## 2021-07-19 PROCEDURE — 1126F AMNT PAIN NOTED NONE PRSNT: CPT | Mod: CPTII,S$GLB,, | Performed by: COLON & RECTAL SURGERY

## 2021-07-19 PROCEDURE — 99213 PR OFFICE/OUTPT VISIT, EST, LEVL III, 20-29 MIN: ICD-10-PCS | Mod: S$GLB,,, | Performed by: COLON & RECTAL SURGERY

## 2021-07-19 PROCEDURE — 3078F DIAST BP <80 MM HG: CPT | Mod: CPTII,S$GLB,, | Performed by: COLON & RECTAL SURGERY

## 2021-07-19 PROCEDURE — 99999 PR PBB SHADOW E&M-EST. PATIENT-LVL III: ICD-10-PCS | Mod: PBBFAC,,, | Performed by: COLON & RECTAL SURGERY

## 2021-08-10 ENCOUNTER — TELEPHONE (OUTPATIENT)
Dept: GASTROENTEROLOGY | Facility: CLINIC | Age: 41
End: 2021-08-10

## 2021-08-17 ENCOUNTER — OFFICE VISIT (OUTPATIENT)
Dept: GASTROENTEROLOGY | Facility: CLINIC | Age: 41
End: 2021-08-17
Payer: COMMERCIAL

## 2021-08-17 DIAGNOSIS — K60.3 ANAL FISTULA: Primary | ICD-10-CM

## 2021-08-17 DIAGNOSIS — K50.813 CROHN'S DISEASE OF BOTH SMALL AND LARGE INTESTINE WITH FISTULA: ICD-10-CM

## 2021-08-17 PROBLEM — J06.9 VIRAL URI WITH COUGH: Status: RESOLVED | Noted: 2020-03-20 | Resolved: 2021-08-17

## 2021-08-17 PROBLEM — R06.2 WHEEZING: Status: RESOLVED | Noted: 2020-03-20 | Resolved: 2021-08-17

## 2021-08-17 PROCEDURE — 1160F RVW MEDS BY RX/DR IN RCRD: CPT | Mod: CPTII,,, | Performed by: INTERNAL MEDICINE

## 2021-08-17 PROCEDURE — 1126F AMNT PAIN NOTED NONE PRSNT: CPT | Mod: CPTII,,, | Performed by: INTERNAL MEDICINE

## 2021-08-17 PROCEDURE — 1159F MED LIST DOCD IN RCRD: CPT | Mod: CPTII,,, | Performed by: INTERNAL MEDICINE

## 2021-08-17 PROCEDURE — 1160F PR REVIEW ALL MEDS BY PRESCRIBER/CLIN PHARMACIST DOCUMENTED: ICD-10-PCS | Mod: CPTII,,, | Performed by: INTERNAL MEDICINE

## 2021-08-17 PROCEDURE — 99214 PR OFFICE/OUTPT VISIT, EST, LEVL IV, 30-39 MIN: ICD-10-PCS | Mod: 95,,, | Performed by: INTERNAL MEDICINE

## 2021-08-17 PROCEDURE — 1126F PR PAIN SEVERITY QUANTIFIED, NO PAIN PRESENT: ICD-10-PCS | Mod: CPTII,,, | Performed by: INTERNAL MEDICINE

## 2021-08-17 PROCEDURE — 99214 OFFICE O/P EST MOD 30 MIN: CPT | Mod: 95,,, | Performed by: INTERNAL MEDICINE

## 2021-08-17 PROCEDURE — 1159F PR MEDICATION LIST DOCUMENTED IN MEDICAL RECORD: ICD-10-PCS | Mod: CPTII,,, | Performed by: INTERNAL MEDICINE

## 2021-08-25 ENCOUNTER — LAB VISIT (OUTPATIENT)
Dept: LAB | Facility: HOSPITAL | Age: 41
End: 2021-08-25
Attending: INTERNAL MEDICINE
Payer: COMMERCIAL

## 2021-08-25 DIAGNOSIS — K50.813 CROHN'S DISEASE OF BOTH SMALL AND LARGE INTESTINE WITH FISTULA: ICD-10-CM

## 2021-08-25 LAB
ALBUMIN SERPL BCP-MCNC: 3.7 G/DL (ref 3.5–5.2)
ALP SERPL-CCNC: 75 U/L (ref 55–135)
ALT SERPL W/O P-5'-P-CCNC: 13 U/L (ref 10–44)
ANION GAP SERPL CALC-SCNC: 6 MMOL/L (ref 8–16)
AST SERPL-CCNC: 13 U/L (ref 10–40)
BASOPHILS # BLD AUTO: 0.03 K/UL (ref 0–0.2)
BASOPHILS NFR BLD: 0.6 % (ref 0–1.9)
BILIRUB SERPL-MCNC: 1.1 MG/DL (ref 0.1–1)
BUN SERPL-MCNC: 17 MG/DL (ref 6–20)
CALCIUM SERPL-MCNC: 9.4 MG/DL (ref 8.7–10.5)
CHLORIDE SERPL-SCNC: 105 MMOL/L (ref 95–110)
CO2 SERPL-SCNC: 28 MMOL/L (ref 23–29)
CREAT SERPL-MCNC: 0.9 MG/DL (ref 0.5–1.4)
CRP SERPL-MCNC: 0.6 MG/L (ref 0–8.2)
DIFFERENTIAL METHOD: ABNORMAL
EOSINOPHIL # BLD AUTO: 0.1 K/UL (ref 0–0.5)
EOSINOPHIL NFR BLD: 2.9 % (ref 0–8)
ERYTHROCYTE [DISTWIDTH] IN BLOOD BY AUTOMATED COUNT: 12.4 % (ref 11.5–14.5)
EST. GFR  (AFRICAN AMERICAN): >60 ML/MIN/1.73 M^2
EST. GFR  (NON AFRICAN AMERICAN): >60 ML/MIN/1.73 M^2
GLUCOSE SERPL-MCNC: 89 MG/DL (ref 70–110)
HCT VFR BLD AUTO: 39.5 % (ref 40–54)
HGB BLD-MCNC: 12.6 G/DL (ref 14–18)
IMM GRANULOCYTES # BLD AUTO: 0.01 K/UL (ref 0–0.04)
IMM GRANULOCYTES NFR BLD AUTO: 0.2 % (ref 0–0.5)
LYMPHOCYTES # BLD AUTO: 1.3 K/UL (ref 1–4.8)
LYMPHOCYTES NFR BLD: 25.9 % (ref 18–48)
MCH RBC QN AUTO: 30 PG (ref 27–31)
MCHC RBC AUTO-ENTMCNC: 31.9 G/DL (ref 32–36)
MCV RBC AUTO: 94 FL (ref 82–98)
MONOCYTES # BLD AUTO: 0.6 K/UL (ref 0.3–1)
MONOCYTES NFR BLD: 12 % (ref 4–15)
NEUTROPHILS # BLD AUTO: 2.8 K/UL (ref 1.8–7.7)
NEUTROPHILS NFR BLD: 58.4 % (ref 38–73)
NRBC BLD-RTO: 0 /100 WBC
PLATELET # BLD AUTO: 248 K/UL (ref 150–450)
PMV BLD AUTO: 10.7 FL (ref 9.2–12.9)
POTASSIUM SERPL-SCNC: 3.9 MMOL/L (ref 3.5–5.1)
PROT SERPL-MCNC: 7.6 G/DL (ref 6–8.4)
RBC # BLD AUTO: 4.2 M/UL (ref 4.6–6.2)
SODIUM SERPL-SCNC: 139 MMOL/L (ref 136–145)
WBC # BLD AUTO: 4.83 K/UL (ref 3.9–12.7)

## 2021-08-25 PROCEDURE — 80299 QUANTITATIVE ASSAY DRUG: CPT | Performed by: INTERNAL MEDICINE

## 2021-08-25 PROCEDURE — 85025 COMPLETE CBC W/AUTO DIFF WBC: CPT | Performed by: INTERNAL MEDICINE

## 2021-08-25 PROCEDURE — 36415 COLL VENOUS BLD VENIPUNCTURE: CPT | Performed by: INTERNAL MEDICINE

## 2021-08-25 PROCEDURE — 82397 CHEMILUMINESCENT ASSAY: CPT | Performed by: INTERNAL MEDICINE

## 2021-08-25 PROCEDURE — 80053 COMPREHEN METABOLIC PANEL: CPT | Performed by: INTERNAL MEDICINE

## 2021-08-25 PROCEDURE — 86140 C-REACTIVE PROTEIN: CPT | Performed by: INTERNAL MEDICINE

## 2021-08-28 LAB
6-TGN ENTSUB RBC: 185 PMOL/8X10(8)RBC (ref 235–450)
6MMP ENTSUB RBC: 1435 PMOL/8X10(8)RBC

## 2021-08-31 ENCOUNTER — PATIENT MESSAGE (OUTPATIENT)
Dept: GASTROENTEROLOGY | Facility: CLINIC | Age: 41
End: 2021-08-31

## 2021-08-31 ENCOUNTER — SPECIALTY PHARMACY (OUTPATIENT)
Dept: PHARMACY | Facility: CLINIC | Age: 41
End: 2021-08-31

## 2021-08-31 LAB
ADALIMUMAB AB SERPL-MCNC: <25 NG/ML
ADALIMUMAB SERPL-MCNC: 8 UG/ML

## 2021-09-06 ENCOUNTER — PATIENT MESSAGE (OUTPATIENT)
Dept: GASTROENTEROLOGY | Facility: CLINIC | Age: 41
End: 2021-09-06

## 2021-09-13 ENCOUNTER — PATIENT MESSAGE (OUTPATIENT)
Dept: GASTROENTEROLOGY | Facility: CLINIC | Age: 41
End: 2021-09-13

## 2021-09-17 ENCOUNTER — PATIENT MESSAGE (OUTPATIENT)
Dept: PHARMACY | Facility: CLINIC | Age: 41
End: 2021-09-17

## 2021-09-20 ENCOUNTER — TELEPHONE (OUTPATIENT)
Dept: SURGERY | Facility: CLINIC | Age: 41
End: 2021-09-20

## 2021-12-17 ENCOUNTER — OFFICE VISIT (OUTPATIENT)
Dept: GASTROENTEROLOGY | Facility: CLINIC | Age: 41
End: 2021-12-17
Payer: COMMERCIAL

## 2021-12-17 DIAGNOSIS — M25.511 PAIN IN JOINT OF RIGHT SHOULDER: ICD-10-CM

## 2021-12-17 DIAGNOSIS — M25.542 ARTHRALGIA OF BOTH HANDS: ICD-10-CM

## 2021-12-17 DIAGNOSIS — M25.541 ARTHRALGIA OF BOTH HANDS: ICD-10-CM

## 2021-12-17 DIAGNOSIS — K60.3 ANAL FISTULA: ICD-10-CM

## 2021-12-17 DIAGNOSIS — K50.813 CROHN'S DISEASE OF BOTH SMALL AND LARGE INTESTINE WITH FISTULA: Primary | ICD-10-CM

## 2021-12-17 PROCEDURE — 99214 OFFICE O/P EST MOD 30 MIN: CPT | Mod: 95,,, | Performed by: INTERNAL MEDICINE

## 2021-12-17 PROCEDURE — 99214 PR OFFICE/OUTPT VISIT, EST, LEVL IV, 30-39 MIN: ICD-10-PCS | Mod: 95,,, | Performed by: INTERNAL MEDICINE

## 2021-12-17 RX ORDER — CELECOXIB 200 MG/1
200 CAPSULE ORAL 2 TIMES DAILY
Qty: 60 CAPSULE | Refills: 0 | Status: SHIPPED | OUTPATIENT
Start: 2021-12-17 | End: 2022-01-18

## 2021-12-30 ENCOUNTER — LAB VISIT (OUTPATIENT)
Dept: LAB | Facility: HOSPITAL | Age: 41
End: 2021-12-30
Attending: INTERNAL MEDICINE
Payer: COMMERCIAL

## 2021-12-30 ENCOUNTER — PATIENT MESSAGE (OUTPATIENT)
Dept: GASTROENTEROLOGY | Facility: CLINIC | Age: 41
End: 2021-12-30
Payer: COMMERCIAL

## 2021-12-30 DIAGNOSIS — K50.813 CROHN'S DISEASE OF BOTH SMALL AND LARGE INTESTINE WITH FISTULA: ICD-10-CM

## 2021-12-30 DIAGNOSIS — R17 ELEVATED BILIRUBIN: Primary | ICD-10-CM

## 2021-12-30 LAB
ALBUMIN SERPL BCP-MCNC: 4.1 G/DL (ref 3.5–5.2)
ALP SERPL-CCNC: 88 U/L (ref 55–135)
ALT SERPL W/O P-5'-P-CCNC: 20 U/L (ref 10–44)
ANION GAP SERPL CALC-SCNC: 11 MMOL/L (ref 8–16)
AST SERPL-CCNC: 24 U/L (ref 10–40)
BASOPHILS # BLD AUTO: 0.03 K/UL (ref 0–0.2)
BASOPHILS NFR BLD: 0.7 % (ref 0–1.9)
BILIRUB SERPL-MCNC: 2.3 MG/DL (ref 0.1–1)
BUN SERPL-MCNC: 24 MG/DL (ref 6–20)
CALCIUM SERPL-MCNC: 9.6 MG/DL (ref 8.7–10.5)
CHLORIDE SERPL-SCNC: 104 MMOL/L (ref 95–110)
CO2 SERPL-SCNC: 26 MMOL/L (ref 23–29)
CREAT SERPL-MCNC: 1 MG/DL (ref 0.5–1.4)
CRP SERPL-MCNC: 1.9 MG/L (ref 0–8.2)
DIFFERENTIAL METHOD: ABNORMAL
EOSINOPHIL # BLD AUTO: 0.1 K/UL (ref 0–0.5)
EOSINOPHIL NFR BLD: 2.2 % (ref 0–8)
ERYTHROCYTE [DISTWIDTH] IN BLOOD BY AUTOMATED COUNT: 12.2 % (ref 11.5–14.5)
EST. GFR  (AFRICAN AMERICAN): >60 ML/MIN/1.73 M^2
EST. GFR  (NON AFRICAN AMERICAN): >60 ML/MIN/1.73 M^2
GLUCOSE SERPL-MCNC: 106 MG/DL (ref 70–110)
HCT VFR BLD AUTO: 39.1 % (ref 40–54)
HGB BLD-MCNC: 12.9 G/DL (ref 14–18)
IMM GRANULOCYTES # BLD AUTO: 0.01 K/UL (ref 0–0.04)
IMM GRANULOCYTES NFR BLD AUTO: 0.2 % (ref 0–0.5)
LYMPHOCYTES # BLD AUTO: 1 K/UL (ref 1–4.8)
LYMPHOCYTES NFR BLD: 23.2 % (ref 18–48)
MCH RBC QN AUTO: 30.8 PG (ref 27–31)
MCHC RBC AUTO-ENTMCNC: 33 G/DL (ref 32–36)
MCV RBC AUTO: 93 FL (ref 82–98)
MONOCYTES # BLD AUTO: 0.7 K/UL (ref 0.3–1)
MONOCYTES NFR BLD: 14.5 % (ref 4–15)
NEUTROPHILS # BLD AUTO: 2.7 K/UL (ref 1.8–7.7)
NEUTROPHILS NFR BLD: 59.2 % (ref 38–73)
NRBC BLD-RTO: 0 /100 WBC
PLATELET # BLD AUTO: 233 K/UL (ref 150–450)
PMV BLD AUTO: 10.4 FL (ref 9.2–12.9)
POTASSIUM SERPL-SCNC: 4.3 MMOL/L (ref 3.5–5.1)
PROT SERPL-MCNC: 8.3 G/DL (ref 6–8.4)
RBC # BLD AUTO: 4.19 M/UL (ref 4.6–6.2)
SODIUM SERPL-SCNC: 141 MMOL/L (ref 136–145)
WBC # BLD AUTO: 4.49 K/UL (ref 3.9–12.7)

## 2021-12-30 PROCEDURE — 80053 COMPREHEN METABOLIC PANEL: CPT | Performed by: INTERNAL MEDICINE

## 2021-12-30 PROCEDURE — 86140 C-REACTIVE PROTEIN: CPT | Performed by: INTERNAL MEDICINE

## 2021-12-30 PROCEDURE — 36415 COLL VENOUS BLD VENIPUNCTURE: CPT | Performed by: INTERNAL MEDICINE

## 2021-12-30 PROCEDURE — 80145 DRUG ASSAY ADALIMUMAB: CPT | Performed by: INTERNAL MEDICINE

## 2021-12-30 PROCEDURE — 85025 COMPLETE CBC W/AUTO DIFF WBC: CPT | Performed by: INTERNAL MEDICINE

## 2022-01-05 ENCOUNTER — LAB VISIT (OUTPATIENT)
Dept: LAB | Facility: HOSPITAL | Age: 42
End: 2022-01-05
Payer: COMMERCIAL

## 2022-01-05 DIAGNOSIS — K50.813 CROHN'S DISEASE OF BOTH SMALL AND LARGE INTESTINE WITH FISTULA: ICD-10-CM

## 2022-01-05 PROCEDURE — 83993 ASSAY FOR CALPROTECTIN FECAL: CPT | Performed by: INTERNAL MEDICINE

## 2022-01-10 LAB
ADALIMUMAB AB SERPL-MCNC: <25 NG/ML
ADALIMUMAB SERPL-MCNC: 21 UG/ML
CALPROTECTIN STL-MCNT: 257.8 MCG/G

## 2022-01-18 ENCOUNTER — LAB VISIT (OUTPATIENT)
Dept: LAB | Facility: HOSPITAL | Age: 42
End: 2022-01-18
Attending: INTERNAL MEDICINE
Payer: COMMERCIAL

## 2022-01-18 DIAGNOSIS — R17 ELEVATED BILIRUBIN: ICD-10-CM

## 2022-01-18 LAB
BILIRUB DIRECT SERPL-MCNC: 0.4 MG/DL (ref 0.1–0.3)
BILIRUB SERPL-MCNC: 1.1 MG/DL (ref 0.1–1)
HAPTOGLOB SERPL-MCNC: 65 MG/DL (ref 30–250)
LDH SERPL L TO P-CCNC: 132 U/L (ref 110–260)

## 2022-01-18 PROCEDURE — 83010 ASSAY OF HAPTOGLOBIN QUANT: CPT | Performed by: INTERNAL MEDICINE

## 2022-01-18 PROCEDURE — 83615 LACTATE (LD) (LDH) ENZYME: CPT | Performed by: INTERNAL MEDICINE

## 2022-01-18 PROCEDURE — 36415 COLL VENOUS BLD VENIPUNCTURE: CPT | Performed by: INTERNAL MEDICINE

## 2022-01-18 PROCEDURE — 82247 BILIRUBIN TOTAL: CPT | Performed by: INTERNAL MEDICINE

## 2022-01-18 PROCEDURE — 82248 BILIRUBIN DIRECT: CPT | Performed by: INTERNAL MEDICINE

## 2022-04-19 ENCOUNTER — OFFICE VISIT (OUTPATIENT)
Dept: RHEUMATOLOGY | Facility: CLINIC | Age: 42
End: 2022-04-19
Payer: COMMERCIAL

## 2022-04-19 ENCOUNTER — OFFICE VISIT (OUTPATIENT)
Dept: GASTROENTEROLOGY | Facility: CLINIC | Age: 42
End: 2022-04-19
Payer: COMMERCIAL

## 2022-04-19 ENCOUNTER — LAB VISIT (OUTPATIENT)
Dept: LAB | Facility: HOSPITAL | Age: 42
End: 2022-04-19
Attending: INTERNAL MEDICINE
Payer: COMMERCIAL

## 2022-04-19 VITALS
WEIGHT: 169.06 LBS | SYSTOLIC BLOOD PRESSURE: 126 MMHG | BODY MASS INDEX: 22.93 KG/M2 | HEART RATE: 86 BPM | DIASTOLIC BLOOD PRESSURE: 87 MMHG

## 2022-04-19 VITALS
HEART RATE: 84 BPM | DIASTOLIC BLOOD PRESSURE: 76 MMHG | TEMPERATURE: 98 F | OXYGEN SATURATION: 100 % | SYSTOLIC BLOOD PRESSURE: 132 MMHG | BODY MASS INDEX: 22.45 KG/M2 | WEIGHT: 165.56 LBS

## 2022-04-19 DIAGNOSIS — M25.511 PAIN IN JOINT OF RIGHT SHOULDER: Primary | ICD-10-CM

## 2022-04-19 DIAGNOSIS — M25.542 ARTHRALGIA OF BOTH HANDS: ICD-10-CM

## 2022-04-19 DIAGNOSIS — K50.813 CROHN'S DISEASE OF BOTH SMALL AND LARGE INTESTINE WITH FISTULA: ICD-10-CM

## 2022-04-19 DIAGNOSIS — M25.541 ARTHRALGIA OF BOTH HANDS: ICD-10-CM

## 2022-04-19 DIAGNOSIS — K50.813 CROHN'S DISEASE OF BOTH SMALL AND LARGE INTESTINE WITH FISTULA: Primary | ICD-10-CM

## 2022-04-19 LAB
25(OH)D3+25(OH)D2 SERPL-MCNC: 46 NG/ML (ref 30–96)
ALBUMIN SERPL BCP-MCNC: 4.1 G/DL (ref 3.5–5.2)
ALP SERPL-CCNC: 85 U/L (ref 55–135)
ALT SERPL W/O P-5'-P-CCNC: 15 U/L (ref 10–44)
ANION GAP SERPL CALC-SCNC: 12 MMOL/L (ref 8–16)
AST SERPL-CCNC: 18 U/L (ref 10–40)
BASOPHILS # BLD AUTO: 0.03 K/UL (ref 0–0.2)
BASOPHILS NFR BLD: 0.7 % (ref 0–1.9)
BILIRUB SERPL-MCNC: 1.4 MG/DL (ref 0.1–1)
BUN SERPL-MCNC: 23 MG/DL (ref 6–20)
CALCIUM SERPL-MCNC: 9.6 MG/DL (ref 8.7–10.5)
CHLORIDE SERPL-SCNC: 106 MMOL/L (ref 95–110)
CO2 SERPL-SCNC: 26 MMOL/L (ref 23–29)
CREAT SERPL-MCNC: 1.1 MG/DL (ref 0.5–1.4)
CRP SERPL-MCNC: 0.6 MG/L (ref 0–8.2)
DIFFERENTIAL METHOD: ABNORMAL
EOSINOPHIL # BLD AUTO: 0.1 K/UL (ref 0–0.5)
EOSINOPHIL NFR BLD: 2.2 % (ref 0–8)
ERYTHROCYTE [DISTWIDTH] IN BLOOD BY AUTOMATED COUNT: 12.1 % (ref 11.5–14.5)
EST. GFR  (AFRICAN AMERICAN): >60 ML/MIN/1.73 M^2
EST. GFR  (NON AFRICAN AMERICAN): >60 ML/MIN/1.73 M^2
GLUCOSE SERPL-MCNC: 78 MG/DL (ref 70–110)
HCT VFR BLD AUTO: 40.6 % (ref 40–54)
HGB BLD-MCNC: 13.3 G/DL (ref 14–18)
IMM GRANULOCYTES # BLD AUTO: 0.01 K/UL (ref 0–0.04)
IMM GRANULOCYTES NFR BLD AUTO: 0.2 % (ref 0–0.5)
LYMPHOCYTES # BLD AUTO: 1.1 K/UL (ref 1–4.8)
LYMPHOCYTES NFR BLD: 26.6 % (ref 18–48)
MCH RBC QN AUTO: 30 PG (ref 27–31)
MCHC RBC AUTO-ENTMCNC: 32.8 G/DL (ref 32–36)
MCV RBC AUTO: 91 FL (ref 82–98)
MONOCYTES # BLD AUTO: 0.5 K/UL (ref 0.3–1)
MONOCYTES NFR BLD: 11.5 % (ref 4–15)
NEUTROPHILS # BLD AUTO: 2.4 K/UL (ref 1.8–7.7)
NEUTROPHILS NFR BLD: 58.8 % (ref 38–73)
NRBC BLD-RTO: 0 /100 WBC
PLATELET # BLD AUTO: 232 K/UL (ref 150–450)
PMV BLD AUTO: 10.4 FL (ref 9.2–12.9)
POTASSIUM SERPL-SCNC: 4.5 MMOL/L (ref 3.5–5.1)
PROT SERPL-MCNC: 8.5 G/DL (ref 6–8.4)
RBC # BLD AUTO: 4.44 M/UL (ref 4.6–6.2)
SODIUM SERPL-SCNC: 144 MMOL/L (ref 136–145)
VIT B12 SERPL-MCNC: 572 PG/ML (ref 210–950)
WBC # BLD AUTO: 4.1 K/UL (ref 3.9–12.7)

## 2022-04-19 PROCEDURE — 3008F BODY MASS INDEX DOCD: CPT | Mod: CPTII,S$GLB,, | Performed by: INTERNAL MEDICINE

## 2022-04-19 PROCEDURE — 99203 PR OFFICE/OUTPT VISIT, NEW, LEVL III, 30-44 MIN: ICD-10-PCS | Mod: S$GLB,,, | Performed by: INTERNAL MEDICINE

## 2022-04-19 PROCEDURE — 3079F DIAST BP 80-89 MM HG: CPT | Mod: CPTII,S$GLB,, | Performed by: INTERNAL MEDICINE

## 2022-04-19 PROCEDURE — 3079F PR MOST RECENT DIASTOLIC BLOOD PRESSURE 80-89 MM HG: ICD-10-PCS | Mod: CPTII,S$GLB,, | Performed by: INTERNAL MEDICINE

## 2022-04-19 PROCEDURE — 87340 HEPATITIS B SURFACE AG IA: CPT | Performed by: INTERNAL MEDICINE

## 2022-04-19 PROCEDURE — 1159F PR MEDICATION LIST DOCUMENTED IN MEDICAL RECORD: ICD-10-PCS | Mod: CPTII,S$GLB,, | Performed by: INTERNAL MEDICINE

## 2022-04-19 PROCEDURE — 1159F MED LIST DOCD IN RCRD: CPT | Mod: CPTII,S$GLB,, | Performed by: INTERNAL MEDICINE

## 2022-04-19 PROCEDURE — 86480 TB TEST CELL IMMUN MEASURE: CPT | Performed by: INTERNAL MEDICINE

## 2022-04-19 PROCEDURE — 99999 PR PBB SHADOW E&M-EST. PATIENT-LVL III: CPT | Mod: PBBFAC,,, | Performed by: INTERNAL MEDICINE

## 2022-04-19 PROCEDURE — 82306 VITAMIN D 25 HYDROXY: CPT | Performed by: INTERNAL MEDICINE

## 2022-04-19 PROCEDURE — 3008F PR BODY MASS INDEX (BMI) DOCUMENTED: ICD-10-PCS | Mod: CPTII,S$GLB,, | Performed by: INTERNAL MEDICINE

## 2022-04-19 PROCEDURE — 82607 VITAMIN B-12: CPT | Performed by: INTERNAL MEDICINE

## 2022-04-19 PROCEDURE — 3078F DIAST BP <80 MM HG: CPT | Mod: CPTII,S$GLB,, | Performed by: INTERNAL MEDICINE

## 2022-04-19 PROCEDURE — 3078F PR MOST RECENT DIASTOLIC BLOOD PRESSURE < 80 MM HG: ICD-10-PCS | Mod: CPTII,S$GLB,, | Performed by: INTERNAL MEDICINE

## 2022-04-19 PROCEDURE — 3074F SYST BP LT 130 MM HG: CPT | Mod: CPTII,S$GLB,, | Performed by: INTERNAL MEDICINE

## 2022-04-19 PROCEDURE — 3074F PR MOST RECENT SYSTOLIC BLOOD PRESSURE < 130 MM HG: ICD-10-PCS | Mod: CPTII,S$GLB,, | Performed by: INTERNAL MEDICINE

## 2022-04-19 PROCEDURE — 85025 COMPLETE CBC W/AUTO DIFF WBC: CPT | Performed by: INTERNAL MEDICINE

## 2022-04-19 PROCEDURE — 80053 COMPREHEN METABOLIC PANEL: CPT | Performed by: INTERNAL MEDICINE

## 2022-04-19 PROCEDURE — 99214 OFFICE O/P EST MOD 30 MIN: CPT | Mod: S$GLB,,, | Performed by: INTERNAL MEDICINE

## 2022-04-19 PROCEDURE — 99214 PR OFFICE/OUTPT VISIT, EST, LEVL IV, 30-39 MIN: ICD-10-PCS | Mod: S$GLB,,, | Performed by: INTERNAL MEDICINE

## 2022-04-19 PROCEDURE — 99203 OFFICE O/P NEW LOW 30 MIN: CPT | Mod: S$GLB,,, | Performed by: INTERNAL MEDICINE

## 2022-04-19 PROCEDURE — 99999 PR PBB SHADOW E&M-EST. PATIENT-LVL III: ICD-10-PCS | Mod: PBBFAC,,, | Performed by: INTERNAL MEDICINE

## 2022-04-19 PROCEDURE — 86706 HEP B SURFACE ANTIBODY: CPT | Performed by: INTERNAL MEDICINE

## 2022-04-19 PROCEDURE — 86140 C-REACTIVE PROTEIN: CPT | Performed by: INTERNAL MEDICINE

## 2022-04-19 PROCEDURE — 3075F PR MOST RECENT SYSTOLIC BLOOD PRESS GE 130-139MM HG: ICD-10-PCS | Mod: CPTII,S$GLB,, | Performed by: INTERNAL MEDICINE

## 2022-04-19 PROCEDURE — 86704 HEP B CORE ANTIBODY TOTAL: CPT | Performed by: INTERNAL MEDICINE

## 2022-04-19 PROCEDURE — 3075F SYST BP GE 130 - 139MM HG: CPT | Mod: CPTII,S$GLB,, | Performed by: INTERNAL MEDICINE

## 2022-04-19 NOTE — PROGRESS NOTES
History of present illness:  From Dr. Stephen's note:     He was diagnosed with Crohn's disease around 1998. He reported symptoms for about 6 years prior to the diagnosis.  He was initially managed by Dr. Delcid at [facility].  He was initially treated with steroids as well as multiple aminosalicylates without any improvement.  Subsequently, Remicade was started and he took this from [id number] but this was stopped in 2009 because of development of antibodies and infusion reactions.  He was then started on Humira.  He began seeing [facility] gastroenterology around 2013. In June of 2014 he was admitted to the hospital with symptoms of active Crohn's disease and imaging at that time showed a stricture in the terminal ileum.  He was managed with steroids and antibiotics and Humira was maintained after that.  It was noted that he had had some issues with pustular lesions of the skin when taking Humira and was seeing Dermatology for this issue.  He was found to have MRSA and underwent treatment for folliculitis.  In September 2016 he presented again with nausea, vomiting, and diarrhea.  A repeat CT scan is again revealed narrowing of the terminal ileum with chronic changes from prior inflammatory episodes but no findings suggestive of active inflammation.  He was managed conservatively at that time and underwent colonoscopy on December 5, 2016. At that time there was no evidence of active inflammation but a severe stricture of the terminal ileum was identified.  He was referred to Dr. Diaz with Colorectal surgery and underwent ileocolonic resection on January 4, 2017. He was supposed to restart Humira after surgery but because of insurance reasons it was not started.  At that time he had developed a recurrent perianal abscess that resolved.  Because of his social situation he did not he did not follow up after 2018. He presented again in July of 2020 with complaints of swelling and purulent drainage in the perianal area.  He  had been off of medication for quite some time.  He saw Dr. Diaz because of these issues and was scheduled for exam under anesthesia in July 2020. At that time of fistulotomy was performed.  A colonoscopy done July 30, 2020 did not reveal any active inflammation and revealed a patent anastomosis.  An adalimumab level and antibodies was performed August (when he had been off of therapy for over 3 years) and there were no antibodies present.  The plan was to restart Humira but this was never done.  He presented again March 1, 2021 with perianal pain and purulence/brownish discharge from the anal canal and was again found to have a perianal abscess.  He underwent exam under anesthesia with seton placement on March 4th.  He had a CT scan prior to surgery that did not reveal any evidence of significant inflammation within the bowel.  He started azathioprine and Humira on April 7, 2021. In September 2021 we increase the dose of Humira to once weekly. He has had ocular involvement as well.      He comes in now because of a 6 week history of right shoulder pain.  This was of gradual onset.  He has trouble moving the shoulder.  He had no history of trauma.  He has had no similar problem with the shoulder in the past.  The pain is been constant.  It is actually gotten better recently.  It is worse with activity.  It does not wake him up at night.  It is better in the morning.  He has no similar pain on the left side.  He has had no swelling in the shoulder.  The pain radiates to the neck and also the scapula.  It does not radiate down the arm.  He has had no other peripheral joint complaints.  He has a prior history of cervical spondylosis treated with epidural blocks.  The pain does interfere with activity.    He has been taking Celebrex infrequently with some relief.  Heat has been helping.  Topical medications did not help.    He has had no unexplained fevers.  He denies any rash, headache, oral ulcers, dry eye or mouth,  Raynaud's phenomena, pleurisy, urethral discharge or ulcers, numbness or tingling.  His mother had some type of arthritis.    Systems review:  General:  Weight has been stable   GI:  Had recent increase in his bili Brady.  No other liver problems.    :  No kidney or bladder problems     Physical examination:  Skin:  No rashes   ENT:  No conjunctivitis or oral ulcers.  Adequate tears in saliva.    Chest:  Clear to auscultation and percussion   Cardiac:  No murmurs, gallops, rubs   Abdomen:  No organomegaly or masses.  He is diffusely tender to palpation.    Extremities:  No pedal edema  Musculoskeletal:  Cervical spine has good range of motion without pain on range of motion.  Left shoulder is unremarkable.  He has pain on range of motion of the right shoulder but has full range of motion.  He has tender in the periscapular area.  He has no soft tissue swelling.    Elbows, wrists, small joints the hands are unremarkable.    He has slight tenderness on palpation of the lumbar spine.  Has good range of motion of the lumbar spine.    Hips, knees, ankles, small joints of the feet are unremarkable.    Assessment:  1. He has shoulder pain most likely due to scapular dysfunction.  The pain does not appear to be coming from the shoulder joint.  2. I do not think the shoulder pain is due to his Crohn's disease.      Plans:  1. I have referred him for physical therapy  2.  If he does not respond to the physical therapy, I would recommend referral to orthopedic surgery  3.  He is to return to see me as needed.

## 2022-04-19 NOTE — PROGRESS NOTES
[facility]          Inflammatory Bowel Disease Follow Up Note         TODAY'S VISIT DATE:  4/19/2022    Reason for Consult:    Chief Complaint   Patient presents with    Crohn's Disease       PCP: Primary Doctor No      Referring MD:   No ref. provider found    History of Present Illness:  Escobar Toro who is a 42 y.o. male is being seen today at the [facility] on 04/19/2022 for inflammatory bowel disease- Crohn's disease.  He continues to do pretty well.  His shoulder problems are improving.  He saw the rheumatologist today to he informs me did not feel like his joint issues were related to his IBD.  He is currently having 1-2 bowel movements daily.  They are soft.  There is no blood in the stools and he has no abdominal pain.  He has minimal if any drainage from his perianal fistula.  He has definitely noticed an improvement since being on weekly Humira.  He also continues to take azathioprine without any issues.  He denies any new complaints today.    IBD History:  He is here for management of Crohn's disease.  He was diagnosed with Crohn's disease around 1998. He reported symptoms for about 6 years prior to the diagnosis.  He was initially managed by Dr. Delcid at [facility].  He was initially treated with steroids as well as multiple aminosalicylates without any improvement.  Subsequently, Remicade was started and he took this from 9090-7087 but this was stopped in 2009 because of development of antibodies and infusion reactions.  He was then started on Humira.  He began seeing [facility] around 2013. In June of 2014 he was admitted to the hospital with symptoms of active Crohn's disease and imaging at that time showed a stricture in the terminal ileum.  He was managed with steroids and antibiotics and Humira was maintained after that.  It was noted that he had had some issues with pustular lesions of the skin when  taking Humira and was seeing Dermatology for this issue.  He was found to have MRSA and underwent treatment for folliculitis.  In September 2016 he presented again with nausea, vomiting, and diarrhea.  A repeat CT scan is again revealed narrowing of the terminal ileum with chronic changes from prior inflammatory episodes but no findings suggestive of active inflammation.  He was managed conservatively at that time and underwent colonoscopy on December 5, 2016. At that time there was no evidence of active inflammation but a severe stricture of the terminal ileum was identified.  He was referred to Dr. Diaz with Colorectal surgery and underwent ileocolonic resection on January 4, 2017. He was supposed to restart Humira after surgery but because of insurance reasons it was not started.  At that time he had developed a recurrent perianal abscess that resolved.  Because of his social situation he did not he did not follow up after 2018. He presented again in July of 2020 with complaints of swelling and purulent drainage in the perianal area.  He had been off of medication for quite some time.  He saw Dr. Diaz because of these issues and was scheduled for exam under anesthesia in July 2020. At that time of fistulotomy was performed.  A colonoscopy done July 30, 2020 did not reveal any active inflammation and revealed a patent anastomosis.  An adalimumab level and antibodies was performed August (when he had been off of therapy for over 3 years) and there were no antibodies present.  The plan was to restart Humira but this was never done.  He presented again March 1, 2021 with perianal pain and purulence/brownish discharge from the anal canal and was again found to have a perianal abscess.  He underwent exam under anesthesia with seton placement on March 4th.  He had a CT scan prior to surgery that did not reveal any evidence of significant inflammation within the bowel.  He started azathioprine and Humira on April 7,  2021. In September 2021 we increased the dose of Humira to once weekly.    IBD Details:  Dx Date:  1998-symptoms since 1992  Disease type/distribution:  Crohn's disease/ileal involvement and perianal disease  Current Treatment:  Azathioprine 100 mg daily and Humira 40 mg weekly  Start Date:  04/07/2021 Response:  Unclear  Optimized:  Yes   Adverse reactions:  Fatigue from azathioprine-improved  Prior surgeries:  Multiple incisions and drainage of abscesses, fistulotomy; January 2017-ileocecal resection for stricturing disease  CRP Elevation: Y  Disease Complications:  Stricturing disease, perianal fistulizing disease with abscess  Extraintestinal manifestations:  None  Prior treatments:   Steroids:  Good response in the past  5ASA:  No response  IMM:   Unclear response to azathioprine-fatigue felt to be related to azathioprine-improved with time   Flu like symptoms with methotrexate  TNF Inh:   Remicade-good response, developed infusion reactions and was found to have high levels of antibodies    Humira-good response, stopped prior to surgery in 2017 and has not been restarted   Anti-Integrin:  None   IL 12/23:  None  PAOLO Inh:  None    Previous Clinical Trials:  None    Last Colonoscopy:  July 2020 patent ileocolonic anastomosis, no active inflammation    Other Endoscopies:  Previous upper endoscopy with no significant findings    Imaging:   MRE:  Previous studies reviewed   CT:  Previous studies reviewed   Other:  Not applicable    Pertinent Labs:  Lab Results   Component Value Date    SEDRATE 36 (H) 12/19/2016    CRP 1.9 12/30/2021     No results found for: TTGIGA, IGA  No results found for: TSH, FREET4  Lab Results   Component Value Date    HZJSCPEH46UZ 48 03/24/2021    AYGDETSO17 734 03/24/2021     Lab Results   Component Value Date    HEPBSAG Negative 03/24/2021    HEPBCAB Negative 03/24/2021    HEPCAB Negative 03/24/2021     Lab Results   Component Value Date    FFI20OKDW Negative 03/24/2021     Lab Results    Component Value Date    NIL 0.030 03/24/2021    TBAG 0.067 07/05/2017    TBAGNIL 0.037 07/05/2017    MITOGENNIL 9.380 03/24/2021    TBGOLD Negative 07/05/2017     Lab Results   Component Value Date    TPTMINTERP SEE BELOW 03/24/2021     Lab Results   Component Value Date    STOOLCULTURE  09/07/2016     No Salmonella,Shigella,Vibrio,Campylobacter,Yersinia isolated.    LWAEMGYFDA0L Negative 09/07/2016    MJTJTVTHPR0B Negative 09/07/2016    CDIFFICILEAN Negative 09/07/2016    CDIFFTOX Negative 09/07/2016     Lab Results   Component Value Date    CALPROTECTIN 257.8 (H) 01/05/2022       Therapeutic Drug Monitoring Labs:  No results found for: PROMETH  No results found for: ANSADAINIT, INFLIXIMAB, INFLIXINTERP    Vaccinations:  Lab Results   Component Value Date    HEPBSAB Negative 03/24/2021     Lab Results   Component Value Date    HEPAIGG Negative 03/24/2021     Lab Results   Component Value Date    VARICELLAZOS 1.75 (H) 03/24/2021    VARICELLAINT Positive (A) 03/24/2021     Immunization History   Administered Date(s) Administered    Hepatitis A / Hepatitis B 06/18/2015    Hepatitis A, Adult 09/19/2014    Influenza 03/25/2013    Influenza - Quadrivalent 12/03/2015    Influenza - Trivalent (ADULT) 03/25/2013    Influenza Split 03/25/2013    Pneumococcal Conjugate - 13 Valent 09/05/2014    Tdap 06/18/2015         Review of Systems  Review of Systems   Constitutional: Negative for chills, fever and weight loss.   HENT: Negative for sore throat.    Eyes: Negative for pain, discharge and redness.   Respiratory: Negative for cough, shortness of breath and wheezing.    Cardiovascular: Negative for chest pain and leg swelling.   Gastrointestinal: Negative for abdominal pain, blood in stool, constipation, diarrhea, heartburn, melena, nausea and vomiting.   Genitourinary: Negative for dysuria and frequency.   Musculoskeletal: Negative for back pain, joint pain and myalgias.   Skin: Negative for rash.   Neurological:  Negative for focal weakness and seizures.   Endo/Heme/Allergies: Does not bruise/bleed easily.   Psychiatric/Behavioral: Negative for depression. The patient is not nervous/anxious.              Medical History:   Past Medical History:   Diagnosis Date    Allergy     seasonal    Chronic diarrhea     Crohn disease     DDD (degenerative disc disease), cervical     Joint pain     Keloid cicatrix     Ulcer     Ulcerative colitis        Surgical History:  Past Surgical History:   Procedure Laterality Date    ABCESS DRAINAGE      APPENDECTOMY      COLON SURGERY  2017    Laparoscopic ileocolic resection    COLONOSCOPY N/A 12/5/2016    Procedure: COLONOSCOPY;  Surgeon: Steven Kerr MD;  [facility]  Service: Endoscopy;  Laterality: N/A;    COLONOSCOPY N/A 7/30/2020    Procedure: COLONOSCOPY;  Surgeon: Steven Kerr MD;  [facility]  Service: Endoscopy;  Laterality: N/A;  covid 7/27/20[place]  instructions given to patient in person-BB.7/28 Called pt to come in the morning. Cannot come in early. EC    DIGITAL RECTAL EXAMINATION UNDER ANESTHESIA N/A 3/3/2021    Procedure: EXAM UNDER ANESTHESIA, DIGITAL, RECTUM;  Surgeon: Pete Diaz MD;  [facility];  Service: Colon and Rectal;  Laterality: N/A;    ESOPHAGOGASTRODUODENOSCOPY N/A 7/30/2020    Procedure: EGD (ESOPHAGOGASTRODUODENOSCOPY);  Surgeon: Steven Kerr MD;  [facility]  Service: Endoscopy;  Laterality: N/A;  covid 7/27/20[place]  instructions given to patient in person-BB    EXAMINATION UNDER ANESTHESIA N/A 7/17/2020    Procedure: EXAM UNDER ANESTHESIA;  Surgeon: Pete Diaz MD;  [facility];  Service: Colon and Rectal;  Laterality: N/A;    INCISION AND DRAINAGE OF ABSCESS N/A 3/3/2021    Procedure: INCISION AND DRAINAGE, ABSCESS;  Surgeon: Pete Diaz MD;  [facility];  Service: Colon and Rectal;  Laterality:  N/A;    INSERTION OF SETON STITCH N/A 3/3/2021    Procedure: PLACEMENT, SETON STITCH;  Surgeon: Pete Diaz MD;  Location: [facility] OR 2ND FLR;  Service: Colon and Rectal;  Laterality: N/A;    LAPAROSCOPIC RESECTION OF SMALL INTESTINE      RECTAL FISTULOTOMY N/A 7/17/2020    Procedure: FISTULOTOMY, RECTUM;  Surgeon: Pete Diaz MD;  Location: [facility] OR 2ND FLR;  Service: Colon and Rectal;  Laterality: N/A;       Family History:   Family History   Problem Relation Age of Onset    Arthritis Father     Cancer Maternal Grandmother     Melanoma Neg Hx     Psoriasis Neg Hx     Lupus Neg Hx     Eczema Neg Hx     Acne Neg Hx     Colon cancer Neg Hx     Rectal cancer Neg Hx     Stomach cancer Neg Hx     Crohn's disease Neg Hx     Esophageal cancer Neg Hx     Irritable bowel syndrome Neg Hx     Ulcerative colitis Neg Hx     Celiac disease Neg Hx        Social History:   Social History     Tobacco Use    Smoking status: Never Smoker    Smokeless tobacco: Never Used   Substance Use Topics    Alcohol use: No    Drug use: No       Allergies: Reviewed    Home Medications:   Medication List with Changes/Refills   Current Medications    AZATHIOPRINE (IMURAN) 50 MG TAB    TAKE 2 TABLETS(100 MG) BY MOUTH EVERY DAY    CELECOXIB (CELEBREX) 200 MG CAPSULE    TAKE 1 CAPSULE(200 MG) BY MOUTH TWICE DAILY    HUMIRA,CF, PEN 40 MG/0.4 ML PNKT    INJECT 1 PEN UNDER THE SKIN EVERY 7 DAYS.       Physical Exam:  Vital Signs:  /76 (BP Location: Left arm, Patient Position: Sitting)   Pulse 84   Temp 98.2 °F (36.8 °C)   Wt 75.1 kg (165 lb 9.1 oz)   SpO2 100%   BMI 22.45 kg/m²   Body mass index is 22.45 kg/m².    Physical Exam  Constitutional:       General: He is not in acute distress.     Appearance: Normal appearance. He is well-developed. He is not ill-appearing or toxic-appearing.   Cardiovascular:      Rate and Rhythm: Normal rate and regular rhythm.      Heart sounds: Normal heart sounds.   Pulmonary:       Effort: Pulmonary effort is normal.      Breath sounds: Normal breath sounds.   Abdominal:      General: Bowel sounds are normal. There is no distension.      Palpations: Abdomen is soft.      Tenderness: There is no abdominal tenderness.   Skin:     General: Skin is warm and dry.      Coloration: Skin is not pale.      Findings: No erythema or rash.   Neurological:      General: No focal deficit present.      Mental Status: He is alert and oriented to person, place, and time.   Psychiatric:         Mood and Affect: Mood normal.         Behavior: Behavior normal.         Thought Content: Thought content normal.         Judgment: Judgment normal.         Labs: reviewed and pertinent noted above    Assessment/Plan:  Escobar Toro is a 42 y.o. male with ileal and perianal Crohn's disease with perianal fistulizing disease with recent abscess. The following issues were addresssed:    1. Crohn's disease of both small and large intestine with fistula      1. Crohn's disease:  He continues to do well.  We will arrange for colonoscopy in the near future.  We will update his labs today.  If there is no evidence of significantly active rectal disease we will get him back with Dr. Diaz to discuss possibly removing his seton in the near future.    2. Perianal abscess:  Seton is in place.  Will continue to follow.  Significant improvement.  If no significant rectal inflammation and minimal output will arrange for follow-up with CRS.    3. Arthralgias/shoulder pain:  Prescription for Celebrex sent to the pharmacy.  Evaluated by Rheumatology today.  Continue to monitor.      # IBD specific health maintenance:  Colon cancer surveillance:  Up-to-date-minimal colonic involvement    Annual:  - Eye exam:  Discuss in the future  - Skin exam (if on IMM/TNF):  Recommend annual exam  - reminded pt to use sunblock/hats/sunprotective clothing  - PAP (if immunosuppressed):  Not applicable    DEXA:  Not applicable    Vitamin D:  Recent  level was normal    Vaccines:    Influenza:  Recommend annual vaccination   Pneumovax:     PCV13:  Completed    PSV23:  Recommend vaccinating   HAV:  Needs vaccination   HBV:  Needs vaccination   Tdap:  Up-to-date-done in 2015   MMR:  Up-to-date   VZV:  Immune   HZV:  Recommend vaccination   HPV:  Not applicable   Meningococcus:  Not applicable   COVID:  Recommend vaccination    Follow up: Follow up in about 6 months (around 10/19/2022).    Thank you again for sending Escobar Toro to see Dr. Gutierrez Stephen today at the [facility]. Please don't hesitate to contact Dr. Stephen if there are any questions regarding this evaluation, or if you have any other patients with inflammatory bowel disease for whom you would like a consultation. You can reach Dr. Stephen at [phone] or by email at [email or web address]    Terry Stephen MD  Department of Gastroenterology  Inflammatory Bowel Disease

## 2022-04-20 LAB
GAMMA INTERFERON BACKGROUND BLD IA-ACNC: 0 IU/ML
HBV CORE AB SERPL QL IA: NEGATIVE
HBV SURFACE AG SERPL QL IA: NEGATIVE
M TB IFN-G CD4+ BCKGRND COR BLD-ACNC: 0.02 IU/ML
MITOGEN IGNF BCKGRD COR BLD-ACNC: 10 IU/ML
TB GOLD PLUS: NEGATIVE
TB2 - NIL: 0.02 IU/ML

## 2022-04-22 ENCOUNTER — TELEPHONE (OUTPATIENT)
Dept: GASTROENTEROLOGY | Facility: CLINIC | Age: 42
End: 2022-04-22
Payer: COMMERCIAL

## 2022-04-22 DIAGNOSIS — K50.813 CROHN'S DISEASE OF BOTH SMALL AND LARGE INTESTINE WITH FISTULA: Primary | ICD-10-CM

## 2022-04-22 LAB
HBV SURFACE AB SER QL IA: NEGATIVE
HBV SURFACE AB SERPL IA-ACNC: <3 MIU/ML

## 2022-04-22 NOTE — TELEPHONE ENCOUNTER
Spoke to pt. Pt requested Shingle vac. Ordered placed/ schedule with heplisav. Pt verbalizes understanding. No other concerns at this time.

## 2022-04-22 NOTE — TELEPHONE ENCOUNTER
----- Message from Iban Quevedo MD sent at 4/22/2022 12:27 PM CDT -----  Marcia/Yin   Please let pt know he is not immune to hepatitis B and you can set him up for hep B vaccine at injection unit or he can go to local pharrmacy.   We recommend heplisav 1 dose and then 2nd dose 4 weeks later, orders placed in case you need it.     Thanks  ESTEFANÍA Quevedo

## 2022-04-25 ENCOUNTER — CLINICAL SUPPORT (OUTPATIENT)
Dept: INFECTIOUS DISEASES | Facility: CLINIC | Age: 42
End: 2022-04-25
Payer: COMMERCIAL

## 2022-04-25 DIAGNOSIS — K50.813 CROHN'S DISEASE OF BOTH SMALL AND LARGE INTESTINE WITH FISTULA: ICD-10-CM

## 2022-04-25 PROCEDURE — 90471 HEPATITIS B (RECOMBINANT) ADJUVANTED, 2 DOSE: ICD-10-PCS | Mod: S$GLB,,, | Performed by: INTERNAL MEDICINE

## 2022-04-25 PROCEDURE — 99999 PR PBB SHADOW E&M-EST. PATIENT-LVL I: CPT | Mod: PBBFAC,,,

## 2022-04-25 PROCEDURE — 90750 ZOSTER RECOMBINANT VACCINE: ICD-10-PCS | Mod: S$GLB,,, | Performed by: INTERNAL MEDICINE

## 2022-04-25 PROCEDURE — 90471 ZOSTER RECOMBINANT VACCINE: ICD-10-PCS | Mod: S$GLB,,, | Performed by: INTERNAL MEDICINE

## 2022-04-25 PROCEDURE — 99999 PR PBB SHADOW E&M-EST. PATIENT-LVL I: ICD-10-PCS | Mod: PBBFAC,,,

## 2022-04-25 PROCEDURE — 90471 IMMUNIZATION ADMIN: CPT | Mod: S$GLB,,, | Performed by: INTERNAL MEDICINE

## 2022-04-25 PROCEDURE — 90739 HEPATITIS B (RECOMBINANT) ADJUVANTED, 2 DOSE: ICD-10-PCS | Mod: S$GLB,,, | Performed by: INTERNAL MEDICINE

## 2022-04-25 PROCEDURE — 90739 HEPB VACC 2/4 DOSE ADULT IM: CPT | Mod: S$GLB,,, | Performed by: INTERNAL MEDICINE

## 2022-04-25 PROCEDURE — 90750 HZV VACC RECOMBINANT IM: CPT | Mod: S$GLB,,, | Performed by: INTERNAL MEDICINE

## 2022-04-25 NOTE — PROGRESS NOTES
Patient received zoster #1 in the left deltoid and, Hep B #1 vaccine in the right deltoid. Pt tolerated well. Pt asked to wait in the clinic 15 minutes after injection in the event of an allergic reaction. Pt verbalized understanding. Pt left in NAD.

## 2022-05-06 ENCOUNTER — TELEPHONE (OUTPATIENT)
Dept: GASTROENTEROLOGY | Facility: CLINIC | Age: 42
End: 2022-05-06
Payer: COMMERCIAL

## 2022-09-30 ENCOUNTER — TELEPHONE (OUTPATIENT)
Dept: GASTROENTEROLOGY | Facility: CLINIC | Age: 42
End: 2022-09-30
Payer: COMMERCIAL

## 2022-09-30 NOTE — TELEPHONE ENCOUNTER
Spoke to Natacha ([facility])  Satnam WINTER submitted case ID# [id number]. Clinical notes faxed [phone]. Confirmation rec'd .

## 2022-10-04 ENCOUNTER — TELEPHONE (OUTPATIENT)
Dept: GASTROENTEROLOGY | Facility: CLINIC | Age: 42
End: 2022-10-04
Payer: COMMERCIAL

## 2022-10-04 ENCOUNTER — PATIENT MESSAGE (OUTPATIENT)
Dept: GASTROENTEROLOGY | Facility: CLINIC | Age: 42
End: 2022-10-04
Payer: COMMERCIAL

## 2022-10-04 NOTE — TELEPHONE ENCOUNTER
----- Message from Abby Abreu sent at 10/4/2022  8:27 AM CDT -----  Escobar Patterson calling regarding Patient Advice (message) for the status of the HUMIRA,CF, PEN 40 mg/0.4 mL PnKt, PT is completely out and have not had the meds since 09/28/22, call back [phone]

## 2022-10-06 ENCOUNTER — TELEPHONE (OUTPATIENT)
Dept: GASTROENTEROLOGY | Facility: CLINIC | Age: 42
End: 2022-10-06
Payer: COMMERCIAL

## 2022-10-06 NOTE — TELEPHONE ENCOUNTER
Spoke to pt. Humira denied. Appeal needed. Pt concern regarding having to restart Humira  LD 9/22. Pt informed will start appeal process. Pt verbalizes understanding. Reminder set to f/u

## 2022-10-11 ENCOUNTER — PATIENT MESSAGE (OUTPATIENT)
Dept: GASTROENTEROLOGY | Facility: CLINIC | Age: 42
End: 2022-10-11
Payer: COMMERCIAL

## 2022-10-11 NOTE — TELEPHONE ENCOUNTER
[id number]. 09/09/2022- 10/09/2023. Portal message sent. Sent to be scan

## 2022-10-19 ENCOUNTER — OFFICE VISIT (OUTPATIENT)
Dept: GASTROENTEROLOGY | Facility: CLINIC | Age: 42
End: 2022-10-19
Payer: COMMERCIAL

## 2022-10-19 VITALS
WEIGHT: 175.25 LBS | DIASTOLIC BLOOD PRESSURE: 82 MMHG | HEART RATE: 92 BPM | SYSTOLIC BLOOD PRESSURE: 132 MMHG | OXYGEN SATURATION: 100 % | TEMPERATURE: 98 F | BODY MASS INDEX: 23.77 KG/M2

## 2022-10-19 DIAGNOSIS — K60.3 ANAL FISTULA: ICD-10-CM

## 2022-10-19 DIAGNOSIS — K50.813 CROHN'S DISEASE OF BOTH SMALL AND LARGE INTESTINE WITH FISTULA: Primary | ICD-10-CM

## 2022-10-19 PROCEDURE — 3075F PR MOST RECENT SYSTOLIC BLOOD PRESS GE 130-139MM HG: ICD-10-PCS | Mod: CPTII,S$GLB,, | Performed by: INTERNAL MEDICINE

## 2022-10-19 PROCEDURE — 1160F PR REVIEW ALL MEDS BY PRESCRIBER/CLIN PHARMACIST DOCUMENTED: ICD-10-PCS | Mod: CPTII,S$GLB,, | Performed by: INTERNAL MEDICINE

## 2022-10-19 PROCEDURE — 1160F RVW MEDS BY RX/DR IN RCRD: CPT | Mod: CPTII,S$GLB,, | Performed by: INTERNAL MEDICINE

## 2022-10-19 PROCEDURE — 3079F DIAST BP 80-89 MM HG: CPT | Mod: CPTII,S$GLB,, | Performed by: INTERNAL MEDICINE

## 2022-10-19 PROCEDURE — 3079F PR MOST RECENT DIASTOLIC BLOOD PRESSURE 80-89 MM HG: ICD-10-PCS | Mod: CPTII,S$GLB,, | Performed by: INTERNAL MEDICINE

## 2022-10-19 PROCEDURE — 99214 PR OFFICE/OUTPT VISIT, EST, LEVL IV, 30-39 MIN: ICD-10-PCS | Mod: S$GLB,,, | Performed by: INTERNAL MEDICINE

## 2022-10-19 PROCEDURE — 99214 OFFICE O/P EST MOD 30 MIN: CPT | Mod: S$GLB,,, | Performed by: INTERNAL MEDICINE

## 2022-10-19 PROCEDURE — 3075F SYST BP GE 130 - 139MM HG: CPT | Mod: CPTII,S$GLB,, | Performed by: INTERNAL MEDICINE

## 2022-10-19 PROCEDURE — 1159F PR MEDICATION LIST DOCUMENTED IN MEDICAL RECORD: ICD-10-PCS | Mod: CPTII,S$GLB,, | Performed by: INTERNAL MEDICINE

## 2022-10-19 PROCEDURE — 1159F MED LIST DOCD IN RCRD: CPT | Mod: CPTII,S$GLB,, | Performed by: INTERNAL MEDICINE

## 2022-10-19 RX ORDER — CELECOXIB 200 MG/1
200 CAPSULE ORAL 2 TIMES DAILY
Qty: 60 CAPSULE | Refills: 0 | Status: SHIPPED | OUTPATIENT
Start: 2022-10-19 | End: 2022-11-21

## 2022-10-19 NOTE — PROGRESS NOTES
[facility]          Inflammatory Bowel Disease Follow Up Note         TODAY'S VISIT DATE:  10/19/2022    Reason for Consult:    Chief Complaint   Patient presents with    Crohn's Disease       PCP: Primary Doctor No      Referring MD:   No ref. provider found    History of Present Illness:  Escobar Toro who is a 42 y.o. male is being seen today at the [facility] on 10/19/2022 for inflammatory bowel disease- Crohn's disease.  Doing quite well.  He missed 3 doses of Humira recently because of an insurance denial for his weekly dosing.  He noticed that during this time he had an increase in abdominal cramping, urgency, and joint pain.  He restarted Humira a few days ago and reports that at this point everything has improved.  He reports that his perianal fistula has not been giving him any issues.  He continues to have the seton in place but has had no drainage from this.  He denies any problems with his azathioprine or Humira.  He never scheduled the colonoscopy that was ordered several months ago.    IBD History:  He is here for management of Crohn's disease.  He was diagnosed with Crohn's disease around 1998. He reported symptoms for about 6 years prior to the diagnosis.  He was initially managed by Dr. Delcid at [facility].  He was initially treated with steroids as well as multiple aminosalicylates without any improvement.  Subsequently, Remicade was started and he took this from 8813-8032 but this was stopped in 2009 because of development of antibodies and infusion reactions.  He was then started on Humira.  He began seeing [facility] around 2013. In June of 2014 he was admitted to the hospital with symptoms of active Crohn's disease and imaging at that time showed a stricture in the terminal ileum.  He was managed with steroids and antibiotics and Humira was maintained after that.  It was noted that he had had some  issues with pustular lesions of the skin when taking Humira and was seeing Dermatology for this issue.  He was found to have MRSA and underwent treatment for folliculitis.  In September 2016 he presented again with nausea, vomiting, and diarrhea.  A repeat CT scan is again revealed narrowing of the terminal ileum with chronic changes from prior inflammatory episodes but no findings suggestive of active inflammation.  He was managed conservatively at that time and underwent colonoscopy on December 5, 2016. At that time there was no evidence of active inflammation but a severe stricture of the terminal ileum was identified.  He was referred to Dr. Diaz with Colorectal surgery and underwent ileocolonic resection on January 4, 2017. He was supposed to restart Humira after surgery but because of insurance reasons it was not started.  At that time he had developed a recurrent perianal abscess that resolved.  Because of his social situation he did not he did not follow up after 2018. He presented again in July of 2020 with complaints of swelling and purulent drainage in the perianal area.  He had been off of medication for quite some time.  He saw Dr. Diaz because of these issues and was scheduled for exam under anesthesia in July 2020. At that time of fistulotomy was performed.  A colonoscopy done July 30, 2020 did not reveal any active inflammation and revealed a patent anastomosis.  An adalimumab level and antibodies was performed August (when he had been off of therapy for over 3 years) and there were no antibodies present.  The plan was to restart Humira but this was never done.  He presented again March 1, 2021 with perianal pain and purulence/brownish discharge from the anal canal and was again found to have a perianal abscess.  He underwent exam under anesthesia with seton placement on March 4th.  He had a CT scan prior to surgery that did not reveal any evidence of significant inflammation within the bowel.  He  started azathioprine and Humira on April 7, 2021. In September 2021 we increased the dose of Humira to once weekly.    IBD Details:  Dx Date:  1998-symptoms since 1992  Disease type/distribution:  Crohn's disease/ileal involvement and perianal disease  Current Treatment:  Azathioprine 100 mg daily and Humira 40 mg weekly  Start Date:  04/07/2021 Response:  Unclear  Optimized:  Yes   Adverse reactions:  Fatigue from azathioprine-improved  Prior surgeries:  Multiple incisions and drainage of abscesses, fistulotomy; January 2017-ileocecal resection for stricturing disease  CRP Elevation: Y  Disease Complications:  Stricturing disease, perianal fistulizing disease with abscess  Extraintestinal manifestations:  None  Prior treatments:   Steroids:  Good response in the past  5ASA:  No response  IMM:   Unclear response to azathioprine-fatigue felt to be related to azathioprine-improved with time   Flu like symptoms with methotrexate  TNF Inh:   Remicade-good response, developed infusion reactions and was found to have high levels of antibodies    Humira-good response, stopped prior to surgery in 2017 and has not been restarted   Anti-Integrin:  None   IL 12/23:  None  PAOLO Inh:  None    Previous Clinical Trials:  None    Last Colonoscopy:  July 2020 patent ileocolonic anastomosis, no active inflammation    Other Endoscopies:  Previous upper endoscopy with no significant findings    Imaging:   MRE:  Previous studies reviewed   CT:  Previous studies reviewed   Other:  Not applicable    Pertinent Labs:  Lab Results   Component Value Date    SEDRATE 36 (H) 12/19/2016    CRP 0.6 04/19/2022     No results found for: TTGIGA, IGA  No results found for: TSH, FREET4  Lab Results   Component Value Date    GIWOGCKB65RM 46 04/19/2022    CIQBOOFO57 572 04/19/2022     Lab Results   Component Value Date    HEPBSAG Negative 04/19/2022    HEPBCAB Negative 04/19/2022    HEPCAB Negative 03/24/2021     Lab Results   Component Value Date     WLI52JJNO Negative 03/24/2021     Lab Results   Component Value Date    NIL 0.64652 04/19/2022    TBAG 0.067 07/05/2017    TBAGNIL 0.037 07/05/2017    MITOGENNIL 10.000 04/19/2022    TBGOLD Negative 07/05/2017     Lab Results   Component Value Date    TPTMINTERP SEE BELOW 03/24/2021     Lab Results   Component Value Date    STOOLCULTURE  09/07/2016     No Salmonella,Shigella,Vibrio,Campylobacter,Yersinia isolated.    JMTZIMENTO9M Negative 09/07/2016    HXJLRBEUTD1L Negative 09/07/2016    CDIFFICILEAN Negative 09/07/2016    CDIFFTOX Negative 09/07/2016     Lab Results   Component Value Date    CALPROTECTIN 257.8 (H) 01/05/2022       Therapeutic Drug Monitoring Labs:  No results found for: PROMETH  No results found for: ANSADAINIT, INFLIXIMAB, INFLIXINTERP    Vaccinations:  Lab Results   Component Value Date    HEPBSAB Negative 03/24/2021     Lab Results   Component Value Date    HEPAIGG Negative 03/24/2021     Lab Results   Component Value Date    VARICELLAZOS 1.75 (H) 03/24/2021    VARICELLAINT Positive (A) 03/24/2021     Immunization History   Administered Date(s) Administered    Hepatitis A / Hepatitis B 06/18/2015    Hepatitis A, Adult 09/19/2014    Hepatitis B (recombinant) Adjuvanted, 2 dose 04/25/2022    Influenza 03/25/2013    Influenza - Quadrivalent 12/03/2015    Influenza - Trivalent (ADULT) 03/25/2013    Influenza Split 03/25/2013    Pneumococcal Conjugate - 13 Valent 09/05/2014    Tdap 06/18/2015    Zoster Recombinant 04/25/2022         Review of Systems  Review of Systems   Constitutional:  Negative for chills, fever and weight loss.   HENT:  Negative for sore throat.    Eyes:  Negative for pain, discharge and redness.   Respiratory:  Negative for cough, shortness of breath and wheezing.    Cardiovascular:  Negative for chest pain and leg swelling.   Gastrointestinal:  Negative for abdominal pain, blood in stool, constipation, diarrhea, heartburn, melena, nausea and vomiting.   Genitourinary:  Negative  for dysuria and frequency.   Musculoskeletal:  Negative for back pain, joint pain and myalgias.   Skin:  Negative for rash.   Neurological:  Negative for focal weakness and seizures.   Endo/Heme/Allergies:  Does not bruise/bleed easily.   Psychiatric/Behavioral:  Negative for depression. The patient is not nervous/anxious.            Medical History:   Past Medical History:   Diagnosis Date    Allergy     seasonal    Crohn disease     Crohn's disease     DDD (degenerative disc disease), cervical     Joint pain     Keloid cicatrix     Ulcer        Surgical History:  Past Surgical History:   Procedure Laterality Date    ABCESS DRAINAGE      APPENDECTOMY      COLON SURGERY  2017    Laparoscopic ileocolic resection    COLONOSCOPY N/A 12/5/2016    Procedure: COLONOSCOPY;  Surgeon: Steven Kerr MD;  [facility]  Service: Endoscopy;  Laterality: N/A;    COLONOSCOPY N/A 7/30/2020    Procedure: COLONOSCOPY;  Surgeon: Steven Kerr MD;  [facility]  Service: Endoscopy;  Laterality: N/A;  covid 7/27/20-[facility]-2nd floor-BB  instructions given to patient in person-BB.7/28 Called pt to come in the morning. Cannot come in early. EC    DIGITAL RECTAL EXAMINATION UNDER ANESTHESIA N/A 3/3/2021    Procedure: EXAM UNDER ANESTHESIA, DIGITAL, RECTUM;  Surgeon: Pete Diaz MD;  [facility];  Service: Colon and Rectal;  Laterality: N/A;    ESOPHAGOGASTRODUODENOSCOPY N/A 7/30/2020    Procedure: EGD (ESOPHAGOGASTRODUODENOSCOPY);  Surgeon: Steven Kerr MD;  [facility]  Service: Endoscopy;  Laterality: N/A;  covid 7/27/20-[facility] floor-BB  instructions given to patient in person-BB    EXAMINATION UNDER ANESTHESIA N/A 7/17/2020    Procedure: EXAM UNDER ANESTHESIA;  Surgeon: Pete Diaz MD;  [facility];  Service: Colon and Rectal;  Laterality: N/A;    INCISION AND DRAINAGE OF ABSCESS N/A 3/3/2021    Procedure: INCISION AND DRAINAGE, ABSCESS;   Surgeon: Pete Diaz MD;  Location: [facility] OR 2ND FLR;  Service: Colon and Rectal;  Laterality: N/A;    INSERTION OF SETON STITCH N/A 3/3/2021    Procedure: PLACEMENT, SETON STITCH;  Surgeon: Pete Diaz MD;  Location: [facility] OR 2ND FLR;  Service: Colon and Rectal;  Laterality: N/A;    LAPAROSCOPIC RESECTION OF SMALL INTESTINE      RECTAL FISTULOTOMY N/A 7/17/2020    Procedure: FISTULOTOMY, RECTUM;  Surgeon: Pete Diaz MD;  Location: [facility] OR 2ND FLR;  Service: Colon and Rectal;  Laterality: N/A;       Family History:   Family History   Problem Relation Age of Onset    Arthritis Father     Cancer Maternal Grandmother     Melanoma Neg Hx     Psoriasis Neg Hx     Lupus Neg Hx     Eczema Neg Hx     Acne Neg Hx     Colon cancer Neg Hx     Rectal cancer Neg Hx     Stomach cancer Neg Hx     Crohn's disease Neg Hx     Esophageal cancer Neg Hx     Irritable bowel syndrome Neg Hx     Ulcerative colitis Neg Hx     Celiac disease Neg Hx        Social History:   Social History     Tobacco Use    Smoking status: Never    Smokeless tobacco: Never   Substance Use Topics    Alcohol use: No    Drug use: No       Allergies: Reviewed    Home Medications:   Medication List with Changes/Refills   Current Medications    AZATHIOPRINE (IMURAN) 50 MG TAB    TAKE 2 TABLETS(100 MG) BY MOUTH EVERY DAY    CARLOS SHABAZZ, PEN 40 MG/0.4 ML PNKT    INJECT 1 PEN UNDER THE SKIN EVERY 7 DAYS.   Changed and/or Refilled Medications    Modified Medication Previous Medication    CELECOXIB (CELEBREX) 200 MG CAPSULE celecoxib (CELEBREX) 200 MG capsule       Take 1 capsule (200 mg total) by mouth 2 (two) times daily.    TAKE 1 CAPSULE(200 MG) BY MOUTH TWICE DAILY       Physical Exam:  Vital Signs:  /82 (BP Location: Left arm, Patient Position: Sitting)   Pulse 92   Temp 98.1 °F (36.7 °C)   Wt 79.5 kg (175 lb 4.3 oz)   SpO2 100%   BMI 23.77 kg/m²   Body mass index is 23.77 kg/m².    Physical Exam  Constitutional:       General: He is not in acute  distress.     Appearance: Normal appearance. He is well-developed. He is not ill-appearing or toxic-appearing.   Cardiovascular:      Rate and Rhythm: Normal rate and regular rhythm.      Heart sounds: Normal heart sounds.   Pulmonary:      Effort: Pulmonary effort is normal.      Breath sounds: Normal breath sounds.   Abdominal:      General: Bowel sounds are normal. There is no distension.      Palpations: Abdomen is soft.      Tenderness: There is no abdominal tenderness.   Skin:     General: Skin is warm and dry.      Coloration: Skin is not pale.      Findings: No erythema or rash.   Neurological:      General: No focal deficit present.      Mental Status: He is alert and oriented to person, place, and time.   Psychiatric:         Mood and Affect: Mood normal.         Behavior: Behavior normal.         Thought Content: Thought content normal.         Judgment: Judgment normal.       Labs: reviewed and pertinent noted above    Assessment/Plan:  Escobar Toro is a 42 y.o. male with ileal and perianal Crohn's disease with perianal fistulizing disease with recent abscess. The following issues were addresssed:    1. Crohn's disease of both small and large intestine with fistula    2. Anal fistula      1. Crohn's disease:  He continues to do well.  We will arrange for colonoscopy in the near future.  We will update his labs today.  If there is no evidence of significantly active rectal disease we will get him back with Dr. Diaz to discuss possibly removing his seton in the near future.    2. Perianal abscess:  Seton is in place.  Will continue to follow.  Significant improvement.  If no significant rectal inflammation and minimal output will arrange for follow-up with CRS.    3. Arthralgias/shoulder pain:  Significantly better with Humira.  Uses Celebrex intermittently but his only had 1 refill sent was prescribed 10 months ago.      # IBD specific health maintenance:  Colon cancer surveillance:   Up-to-date-minimal colonic involvement    Annual:  - Eye exam:  Discuss in the future  - Skin exam (if on IMM/TNF):  Recommend annual exam-referral placed today  - reminded pt to use sunblock/hats/sunprotective clothing  - PAP (if immunosuppressed):  Not applicable    DEXA:  Not applicable    Vitamin D:  Recent level was normal    Vaccines:    Influenza:  Recommend annual vaccination   Pneumovax:     PCV13:  Completed    PSV23:  Recommend vaccinating   HAV:  Needs vaccination   HBV:  Needs vaccination   Tdap:  Up-to-date-done in 2015   MMR:  Up-to-date   VZV:  Immune   HZV:  Recommend vaccination   HPV:  Not applicable   Meningococcus:  Not applicable   COVID:  Recommend vaccination    Follow up: Follow up in about 6 months (around 4/19/2023).    Thank you again for sending Escobar Toro to see Dr. Gutierrez Stephen today at the [facility]. Please don't hesitate to contact Dr. Stephen if there are any questions regarding this evaluation, or if you have any other patients with inflammatory bowel disease for whom you would like a consultation. You can reach Dr. Stephen at [phone] or by email at [email or web address]    Terry Stephen MD  Department of Gastroenterology  Inflammatory Bowel Disease

## 2022-10-19 NOTE — PROGRESS NOTES
IBD PATIENT INTAKE:    COVID symptoms in the last 7 days (runny nose, sore throat, congestion, cough, fever): No  PCP: Primary Doctor No  If not PCP-  number given to establish [phone]: No    ALLERGIES REVIEWED:  Yes    CHIEF COMPLAINT:    Chief Complaint   Patient presents with    Crohn's Disease       VITAL SIGNS:  /82 (BP Location: Left arm, Patient Position: Sitting)   Pulse 92   Temp 98.1 °F (36.7 °C)   Wt 79.5 kg (175 lb 4.3 oz)   SpO2 100%   BMI 23.77 kg/m²      Change in medical, surgical, family or social history: No    IBD THERAPY (name, dose/frequency):  Humira q7d Imuran 100mg  Last dose:  10/13    Next dose:  10/20  Infusion/Pharmacy: [facility]    NSAIDs (aspirin, ibuprofen-advil or motrin, naproxen-aleve, diclofenac-voltaren, BC powder, excedrin, goodies): No    Alternative/Complementary Medications (i.e. probiotics, turmeric, fish oil, aloe vera):      no  Name/dose:  n/a    Vitamins:   Vit D:  no     Vit B-12:  no   Folic Acid: no       Calcium: no     Iron:  no      MVI: no    Antibiotics (past 30 Days):  no  If yes   Indication:  Name of antibiotic:  Completion date:     REVIEWED MEDICATION LIST RECONCILED INCLUDING ABOVE MEDS:  yes

## 2022-10-19 NOTE — PATIENT INSTRUCTIONS
Continue current medications  Get labs  Schedule dermatology appointment  Schedule colonoscopy  Follow up in 6 months

## 2022-11-17 ENCOUNTER — CLINICAL SUPPORT (OUTPATIENT)
Dept: ENDOSCOPY | Facility: HOSPITAL | Age: 42
End: 2022-11-17
Attending: INTERNAL MEDICINE
Payer: COMMERCIAL

## 2022-11-17 VITALS — WEIGHT: 165 LBS | HEIGHT: 72 IN | BODY MASS INDEX: 22.35 KG/M2

## 2022-11-17 DIAGNOSIS — K50.813 CROHN'S DISEASE OF BOTH SMALL AND LARGE INTESTINE WITH FISTULA: ICD-10-CM

## 2022-11-17 RX ORDER — POLYETHYLENE GLYCOL 3350, SODIUM SULFATE ANHYDROUS, SODIUM BICARBONATE, SODIUM CHLORIDE, POTASSIUM CHLORIDE 236; 22.74; 6.74; 5.86; 2.97 G/4L; G/4L; G/4L; G/4L; G/4L
4 POWDER, FOR SOLUTION ORAL ONCE
Qty: 4000 ML | Refills: 0 | Status: SHIPPED | OUTPATIENT
Start: 2022-11-17 | End: 2022-11-17

## 2022-11-18 ENCOUNTER — PATIENT MESSAGE (OUTPATIENT)
Dept: ENDOSCOPY | Facility: HOSPITAL | Age: 42
End: 2022-11-18
Payer: COMMERCIAL

## 2022-12-01 ENCOUNTER — TELEPHONE (OUTPATIENT)
Dept: ADMINISTRATIVE | Facility: HOSPITAL | Age: 42
End: 2022-12-01
Payer: COMMERCIAL

## 2022-12-27 DIAGNOSIS — K50.813 CROHN'S DISEASE OF BOTH SMALL AND LARGE INTESTINE WITH FISTULA: Primary | ICD-10-CM

## 2022-12-27 RX ORDER — ADALIMUMAB 40MG/0.4ML
KIT SUBCUTANEOUS
Qty: 4 PEN | Refills: 5 | Status: SHIPPED | OUTPATIENT
Start: 2022-12-27 | End: 2023-06-26

## 2022-12-27 NOTE — TELEPHONE ENCOUNTER
Refill request for Humira    Allergies reviewed     Drug Monitoring labs:  CBC/CMP Q 6 months                                        TB Quantiferon yearly                                        HBsAg/ HBcAb yearly    Lab Results   Component Value Date    HEPBSAG Negative 04/19/2022    HEPBCAB Negative 04/19/2022     Lab Results   Component Value Date    TBGOLDPLUS Negative 04/19/2022     Lab Results   Component Value Date    THJPUIBF46EJ 46 04/19/2022    SJTOBIAL04 572 04/19/2022     Lab Results   Component Value Date    WBC 4.78 10/19/2022    HGB 13.3 (L) 10/19/2022    HCT 40.6 10/19/2022    MCV 93 10/19/2022     10/19/2022     Lab Results   Component Value Date    CREATININE 1.0 10/19/2022    ALBUMIN 4.1 10/19/2022    BILITOT 1.2 (H) 10/19/2022    ALKPHOS 78 10/19/2022    AST 13 10/19/2022    ALT 10 10/19/2022       Labs due:  4/2023    Next appt: 4/19/2023    RX pended

## 2022-12-30 ENCOUNTER — TELEPHONE (OUTPATIENT)
Dept: GASTROENTEROLOGY | Facility: CLINIC | Age: 42
End: 2022-12-30
Payer: COMMERCIAL

## 2022-12-30 NOTE — TELEPHONE ENCOUNTER
Dr. Stephen approved dispense of 12 pens at a time with 1 refill, called in    ----- Message from Abbe Carter MA sent at 12/30/2022 11:23 AM CST -----  Contact: [phone]    Type:  RX Refill Request      Who Called: Clayton from [facility]      RX Name and Strength:adalimumab (HUMIRA,CF, PEN) 40 mg/0.4 mL PnKt      Preferred Pharmacy with phone number:       [facility]Y Ram[facility]rez - [facility] [address]  [facility] [address] [place] [place] [address]  Phone: [phone] Fax: [phone]          Would the patient rather a call back or a response via [facility]? Callback      Best Call Back Number: [phone]      Additional Information: Clayton states pt needs 84 day supply.

## 2023-01-03 ENCOUNTER — TELEPHONE (OUTPATIENT)
Dept: GASTROENTEROLOGY | Facility: CLINIC | Age: 43
End: 2023-01-03
Payer: COMMERCIAL

## 2023-01-03 NOTE — TELEPHONE ENCOUNTER
Humira medication clarification request signed and faxed  to [facility]. Confirmation rec'd. Sent to be scan

## 2023-01-12 ENCOUNTER — TELEPHONE (OUTPATIENT)
Dept: GASTROENTEROLOGY | Facility: CLINIC | Age: 43
End: 2023-01-12
Payer: COMMERCIAL

## 2023-01-12 ENCOUNTER — PATIENT MESSAGE (OUTPATIENT)
Dept: GASTROENTEROLOGY | Facility: CLINIC | Age: 43
End: 2023-01-12
Payer: COMMERCIAL

## 2023-03-31 NOTE — PROGRESS NOTES
Biopsies look fine. Waiting on the Humira antibodies level. Impression: Fuch's corneal dystrophy. 2+ Guttae OD Trace Guttae OS. Plan: See plan above.

## 2023-05-20 DIAGNOSIS — K50.813 CROHN'S DISEASE OF BOTH SMALL AND LARGE INTESTINE WITH FISTULA: ICD-10-CM

## 2023-05-22 RX ORDER — AZATHIOPRINE 50 MG/1
TABLET ORAL
Qty: 60 TABLET | Refills: 2 | Status: SHIPPED | OUTPATIENT
Start: 2023-05-22 | End: 2023-08-31

## 2023-05-22 NOTE — TELEPHONE ENCOUNTER
IBD medications Imuran 50 mg Take 2 tablets (100 mg ) QD, Humira 40 mg Q 7 days    Refill request for medications Imuran 50 mg Take 2 tablets (100 mg ) QD    Allergies reviewed Yes    Drug Monitoring labs/frequency for all IBD meds:    CBC / CMP Q 3 mo  TB Quantiferon Yearly  HBsAg / HBcAb Yearly    Lab Results   Component Value Date    HEPBSAG Negative 04/19/2022    HEPBCAB Negative 04/19/2022     Lab Results   Component Value Date    TBGOLDPLUS Negative 04/19/2022     Lab Results   Component Value Date    EISXCJOR67AY 46 04/19/2022    SUXRNKWE33 572 04/19/2022     Lab Results   Component Value Date    WBC 4.78 10/19/2022    HGB 13.3 (L) 10/19/2022    HCT 40.6 10/19/2022    MCV 93 10/19/2022     10/19/2022     Lab Results   Component Value Date    CREATININE 1.0 10/19/2022    ALBUMIN 4.1 10/19/2022    BILITOT 1.2 (H) 10/19/2022    ALKPHOS 78 10/19/2022    AST 13 10/19/2022    ALT 10 10/19/2022       Lab due date (mo/yr):  1/2023    Labs scheduled: Yes    Next appt: 6/26/2023    RX refill sent to provider for amount until next labs: Yes

## 2023-05-23 ENCOUNTER — LAB VISIT (OUTPATIENT)
Dept: LAB | Facility: HOSPITAL | Age: 43
End: 2023-05-23
Attending: INTERNAL MEDICINE
Payer: COMMERCIAL

## 2023-05-23 DIAGNOSIS — K50.813 CROHN'S DISEASE OF BOTH SMALL AND LARGE INTESTINE WITH FISTULA: ICD-10-CM

## 2023-05-23 LAB
ALBUMIN SERPL BCP-MCNC: 3.8 G/DL (ref 3.5–5.2)
ALP SERPL-CCNC: 81 U/L (ref 55–135)
ALT SERPL W/O P-5'-P-CCNC: 12 U/L (ref 10–44)
ANION GAP SERPL CALC-SCNC: 9 MMOL/L (ref 8–16)
AST SERPL-CCNC: 15 U/L (ref 10–40)
BASOPHILS # BLD AUTO: 0.05 K/UL (ref 0–0.2)
BASOPHILS NFR BLD: 1 % (ref 0–1.9)
BILIRUB SERPL-MCNC: 1 MG/DL (ref 0.1–1)
BUN SERPL-MCNC: 23 MG/DL (ref 6–20)
CALCIUM SERPL-MCNC: 9.2 MG/DL (ref 8.7–10.5)
CHLORIDE SERPL-SCNC: 109 MMOL/L (ref 95–110)
CO2 SERPL-SCNC: 25 MMOL/L (ref 23–29)
CREAT SERPL-MCNC: 1.2 MG/DL (ref 0.5–1.4)
DIFFERENTIAL METHOD: ABNORMAL
EOSINOPHIL # BLD AUTO: 0.2 K/UL (ref 0–0.5)
EOSINOPHIL NFR BLD: 3.4 % (ref 0–8)
ERYTHROCYTE [DISTWIDTH] IN BLOOD BY AUTOMATED COUNT: 12 % (ref 11.5–14.5)
EST. GFR  (NO RACE VARIABLE): >60 ML/MIN/1.73 M^2
GLUCOSE SERPL-MCNC: 96 MG/DL (ref 70–110)
HCT VFR BLD AUTO: 41.6 % (ref 40–54)
HGB BLD-MCNC: 13.4 G/DL (ref 14–18)
IMM GRANULOCYTES # BLD AUTO: 0.02 K/UL (ref 0–0.04)
IMM GRANULOCYTES NFR BLD AUTO: 0.4 % (ref 0–0.5)
LYMPHOCYTES # BLD AUTO: 1.6 K/UL (ref 1–4.8)
LYMPHOCYTES NFR BLD: 32.3 % (ref 18–48)
MCH RBC QN AUTO: 29.8 PG (ref 27–31)
MCHC RBC AUTO-ENTMCNC: 32.2 G/DL (ref 32–36)
MCV RBC AUTO: 93 FL (ref 82–98)
MONOCYTES # BLD AUTO: 0.7 K/UL (ref 0.3–1)
MONOCYTES NFR BLD: 13.2 % (ref 4–15)
NEUTROPHILS # BLD AUTO: 2.5 K/UL (ref 1.8–7.7)
NEUTROPHILS NFR BLD: 49.7 % (ref 38–73)
NRBC BLD-RTO: 0 /100 WBC
PLATELET # BLD AUTO: 216 K/UL (ref 150–450)
PMV BLD AUTO: 11.1 FL (ref 9.2–12.9)
POTASSIUM SERPL-SCNC: 4.2 MMOL/L (ref 3.5–5.1)
PROT SERPL-MCNC: 7.5 G/DL (ref 6–8.4)
RBC # BLD AUTO: 4.49 M/UL (ref 4.6–6.2)
SODIUM SERPL-SCNC: 143 MMOL/L (ref 136–145)
WBC # BLD AUTO: 5.01 K/UL (ref 3.9–12.7)

## 2023-05-23 PROCEDURE — 36415 COLL VENOUS BLD VENIPUNCTURE: CPT | Performed by: INTERNAL MEDICINE

## 2023-05-23 PROCEDURE — 80053 COMPREHEN METABOLIC PANEL: CPT | Performed by: INTERNAL MEDICINE

## 2023-05-23 PROCEDURE — 85025 COMPLETE CBC W/AUTO DIFF WBC: CPT | Performed by: INTERNAL MEDICINE

## 2023-06-20 DIAGNOSIS — K50.813 CROHN'S DISEASE OF BOTH SMALL AND LARGE INTESTINE WITH FISTULA: ICD-10-CM

## 2023-06-21 ENCOUNTER — TELEPHONE (OUTPATIENT)
Dept: GASTROENTEROLOGY | Facility: CLINIC | Age: 43
End: 2023-06-21
Payer: COMMERCIAL

## 2023-06-21 ENCOUNTER — PATIENT MESSAGE (OUTPATIENT)
Dept: GASTROENTEROLOGY | Facility: CLINIC | Age: 43
End: 2023-06-21
Payer: COMMERCIAL

## 2023-06-21 DIAGNOSIS — K50.813 CROHN'S DISEASE OF BOTH SMALL AND LARGE INTESTINE WITH FISTULA: Primary | ICD-10-CM

## 2023-06-26 ENCOUNTER — OFFICE VISIT (OUTPATIENT)
Dept: GASTROENTEROLOGY | Facility: CLINIC | Age: 43
End: 2023-06-26
Payer: COMMERCIAL

## 2023-06-26 ENCOUNTER — LAB VISIT (OUTPATIENT)
Dept: LAB | Facility: HOSPITAL | Age: 43
End: 2023-06-26
Attending: INTERNAL MEDICINE
Payer: COMMERCIAL

## 2023-06-26 VITALS
WEIGHT: 182.75 LBS | DIASTOLIC BLOOD PRESSURE: 82 MMHG | TEMPERATURE: 98 F | HEART RATE: 74 BPM | SYSTOLIC BLOOD PRESSURE: 120 MMHG | BODY MASS INDEX: 24.79 KG/M2 | OXYGEN SATURATION: 99 %

## 2023-06-26 DIAGNOSIS — K60.3 ANAL FISTULA: ICD-10-CM

## 2023-06-26 DIAGNOSIS — K50.813 CROHN'S DISEASE OF BOTH SMALL AND LARGE INTESTINE WITH FISTULA: Primary | ICD-10-CM

## 2023-06-26 DIAGNOSIS — K50.813 CROHN'S DISEASE OF BOTH SMALL AND LARGE INTESTINE WITH FISTULA: ICD-10-CM

## 2023-06-26 LAB
ALBUMIN SERPL BCP-MCNC: 4 G/DL (ref 3.5–5.2)
ALP SERPL-CCNC: 80 U/L (ref 55–135)
ALT SERPL W/O P-5'-P-CCNC: 11 U/L (ref 10–44)
ANION GAP SERPL CALC-SCNC: 10 MMOL/L (ref 8–16)
AST SERPL-CCNC: 14 U/L (ref 10–40)
BASOPHILS # BLD AUTO: 0.04 K/UL (ref 0–0.2)
BASOPHILS NFR BLD: 0.8 % (ref 0–1.9)
BILIRUB SERPL-MCNC: 1.2 MG/DL (ref 0.1–1)
BUN SERPL-MCNC: 16 MG/DL (ref 6–20)
CALCIUM SERPL-MCNC: 9.4 MG/DL (ref 8.7–10.5)
CHLORIDE SERPL-SCNC: 107 MMOL/L (ref 95–110)
CO2 SERPL-SCNC: 27 MMOL/L (ref 23–29)
CREAT SERPL-MCNC: 1.2 MG/DL (ref 0.5–1.4)
CRP SERPL-MCNC: 0.4 MG/L (ref 0–8.2)
DIFFERENTIAL METHOD: ABNORMAL
EOSINOPHIL # BLD AUTO: 0.1 K/UL (ref 0–0.5)
EOSINOPHIL NFR BLD: 2.7 % (ref 0–8)
ERYTHROCYTE [DISTWIDTH] IN BLOOD BY AUTOMATED COUNT: 12.1 % (ref 11.5–14.5)
EST. GFR  (NO RACE VARIABLE): >60 ML/MIN/1.73 M^2
GLUCOSE SERPL-MCNC: 93 MG/DL (ref 70–110)
HBV CORE AB SERPL QL IA: NORMAL
HBV SURFACE AG SERPL QL IA: NORMAL
HCT VFR BLD AUTO: 41.4 % (ref 40–54)
HGB BLD-MCNC: 13.5 G/DL (ref 14–18)
IMM GRANULOCYTES # BLD AUTO: 0.01 K/UL (ref 0–0.04)
IMM GRANULOCYTES NFR BLD AUTO: 0.2 % (ref 0–0.5)
LYMPHOCYTES # BLD AUTO: 1.1 K/UL (ref 1–4.8)
LYMPHOCYTES NFR BLD: 22.9 % (ref 18–48)
MCH RBC QN AUTO: 30.3 PG (ref 27–31)
MCHC RBC AUTO-ENTMCNC: 32.6 G/DL (ref 32–36)
MCV RBC AUTO: 93 FL (ref 82–98)
MONOCYTES # BLD AUTO: 0.5 K/UL (ref 0.3–1)
MONOCYTES NFR BLD: 10.8 % (ref 4–15)
NEUTROPHILS # BLD AUTO: 3 K/UL (ref 1.8–7.7)
NEUTROPHILS NFR BLD: 62.6 % (ref 38–73)
NRBC BLD-RTO: 0 /100 WBC
PLATELET # BLD AUTO: 249 K/UL (ref 150–450)
PMV BLD AUTO: 10.2 FL (ref 9.2–12.9)
POTASSIUM SERPL-SCNC: 4.1 MMOL/L (ref 3.5–5.1)
PROT SERPL-MCNC: 7.9 G/DL (ref 6–8.4)
RBC # BLD AUTO: 4.45 M/UL (ref 4.6–6.2)
SODIUM SERPL-SCNC: 144 MMOL/L (ref 136–145)
WBC # BLD AUTO: 4.8 K/UL (ref 3.9–12.7)

## 2023-06-26 PROCEDURE — 1160F RVW MEDS BY RX/DR IN RCRD: CPT | Mod: CPTII,S$GLB,, | Performed by: INTERNAL MEDICINE

## 2023-06-26 PROCEDURE — 1160F PR REVIEW ALL MEDS BY PRESCRIBER/CLIN PHARMACIST DOCUMENTED: ICD-10-PCS | Mod: CPTII,S$GLB,, | Performed by: INTERNAL MEDICINE

## 2023-06-26 PROCEDURE — 3008F PR BODY MASS INDEX (BMI) DOCUMENTED: ICD-10-PCS | Mod: CPTII,S$GLB,, | Performed by: INTERNAL MEDICINE

## 2023-06-26 PROCEDURE — 87340 HEPATITIS B SURFACE AG IA: CPT | Performed by: INTERNAL MEDICINE

## 2023-06-26 PROCEDURE — 99214 PR OFFICE/OUTPT VISIT, EST, LEVL IV, 30-39 MIN: ICD-10-PCS | Mod: S$GLB,,, | Performed by: INTERNAL MEDICINE

## 2023-06-26 PROCEDURE — 85025 COMPLETE CBC W/AUTO DIFF WBC: CPT | Performed by: INTERNAL MEDICINE

## 2023-06-26 PROCEDURE — 86704 HEP B CORE ANTIBODY TOTAL: CPT | Performed by: INTERNAL MEDICINE

## 2023-06-26 PROCEDURE — 3008F BODY MASS INDEX DOCD: CPT | Mod: CPTII,S$GLB,, | Performed by: INTERNAL MEDICINE

## 2023-06-26 PROCEDURE — 1159F PR MEDICATION LIST DOCUMENTED IN MEDICAL RECORD: ICD-10-PCS | Mod: CPTII,S$GLB,, | Performed by: INTERNAL MEDICINE

## 2023-06-26 PROCEDURE — 1159F MED LIST DOCD IN RCRD: CPT | Mod: CPTII,S$GLB,, | Performed by: INTERNAL MEDICINE

## 2023-06-26 PROCEDURE — 3074F SYST BP LT 130 MM HG: CPT | Mod: CPTII,S$GLB,, | Performed by: INTERNAL MEDICINE

## 2023-06-26 PROCEDURE — 99214 OFFICE O/P EST MOD 30 MIN: CPT | Mod: S$GLB,,, | Performed by: INTERNAL MEDICINE

## 2023-06-26 PROCEDURE — 86480 TB TEST CELL IMMUN MEASURE: CPT | Performed by: INTERNAL MEDICINE

## 2023-06-26 PROCEDURE — 86140 C-REACTIVE PROTEIN: CPT | Performed by: INTERNAL MEDICINE

## 2023-06-26 PROCEDURE — 3079F PR MOST RECENT DIASTOLIC BLOOD PRESSURE 80-89 MM HG: ICD-10-PCS | Mod: CPTII,S$GLB,, | Performed by: INTERNAL MEDICINE

## 2023-06-26 PROCEDURE — 80053 COMPREHEN METABOLIC PANEL: CPT | Performed by: INTERNAL MEDICINE

## 2023-06-26 PROCEDURE — 3079F DIAST BP 80-89 MM HG: CPT | Mod: CPTII,S$GLB,, | Performed by: INTERNAL MEDICINE

## 2023-06-26 PROCEDURE — 36415 COLL VENOUS BLD VENIPUNCTURE: CPT | Performed by: INTERNAL MEDICINE

## 2023-06-26 PROCEDURE — 3074F PR MOST RECENT SYSTOLIC BLOOD PRESSURE < 130 MM HG: ICD-10-PCS | Mod: CPTII,S$GLB,, | Performed by: INTERNAL MEDICINE

## 2023-06-26 RX ORDER — ADALIMUMAB 40MG/0.4ML
KIT SUBCUTANEOUS
Qty: 4 PEN | Refills: 3 | Status: SHIPPED | OUTPATIENT
Start: 2023-06-26 | End: 2023-10-10

## 2023-06-26 NOTE — PATIENT INSTRUCTIONS
Continue Humira and azathioprine  Get labs today  Submit stool sample  Contact financial assistance dept  Follow up in 6 months

## 2023-06-26 NOTE — PROGRESS NOTES
[facility]          Inflammatory Bowel Disease Follow Up Note         TODAY'S VISIT DATE:  6/26/2023    Reason for Consult:    Chief Complaint   Patient presents with    Crohn's Disease       PCP: Primary Doctor No      Referring MD:   No ref. provider found    History of Present Illness:  Escobar Toro who is a 43 y.o. male is being seen today at the [facility] on 06/26/2023 for inflammatory bowel disease- Crohn's disease.  He continues to do pretty well.  He reports that he has 1-2 bowel movements most days.  The consistency fluctuates between formed and loose.  On some days he will have up to 6 or 8 bowel movements in a day but this is less than once a month.  Denies any nocturnal bowel movements.  He denies any blood in the stools or abdominal pain.  He is had no issues with his perianal fistula.  The seton has been removed.  There has not been any recurrence.  He denies any side effects related to his Humira weekly and azathioprine.  He has not scheduled his colonoscopy because of a high insurance deductible.    IBD History:  He is here for management of Crohn's disease.  He was diagnosed with Crohn's disease around 1998. He reported symptoms for about 6 years prior to the diagnosis.  He was initially managed by Dr. Delcid at [facility].  He was initially treated with steroids as well as multiple aminosalicylates without any improvement.  Subsequently, Remicade was started and he took this from [id number] but this was stopped in 2009 because of development of antibodies and infusion reactions.  He was then started on Humira.  He began seeing [facility] around 2013. In June of 2014 he was admitted to the hospital with symptoms of active Crohn's disease and imaging at that time showed a stricture in the terminal ileum.  He was managed with steroids and antibiotics and Humira was maintained after that.  It was noted that he  had had some issues with pustular lesions of the skin when taking Humira and was seeing Dermatology for this issue.  He was found to have MRSA and underwent treatment for folliculitis.  In September 2016 he presented again with nausea, vomiting, and diarrhea.  A repeat CT scan is again revealed narrowing of the terminal ileum with chronic changes from prior inflammatory episodes but no findings suggestive of active inflammation.  He was managed conservatively at that time and underwent colonoscopy on December 5, 2016. At that time there was no evidence of active inflammation but a severe stricture of the terminal ileum was identified.  He was referred to Dr. Diaz with Colorectal surgery and underwent ileocolonic resection on January 4, 2017. He was supposed to restart Humira after surgery but because of insurance reasons it was not started.  At that time he had developed a recurrent perianal abscess that resolved.  Because of his social situation he did not he did not follow up after 2018. He presented again in July of 2020 with complaints of swelling and purulent drainage in the perianal area.  He had been off of medication for quite some time.  He saw Dr. Diaz because of these issues and was scheduled for exam under anesthesia in July 2020. At that time of fistulotomy was performed.  A colonoscopy done July 30, 2020 did not reveal any active inflammation and revealed a patent anastomosis.  An adalimumab level and antibodies was performed August (when he had been off of therapy for over 3 years) and there were no antibodies present.  The plan was to restart Humira but this was never done.  He presented again March 1, 2021 with perianal pain and purulence/brownish discharge from the anal canal and was again found to have a perianal abscess.  He underwent exam under anesthesia with seton placement on March 4th.  He had a CT scan prior to surgery that did not reveal any evidence of significant inflammation within the  bowel.  He started azathioprine and Humira on April 7, 2021. In September 2021 we increased the dose of Humira to once weekly.    IBD Details:  Dx Date:  1998-symptoms since 1992  Disease type/distribution:  Crohn's disease/ileal involvement and perianal disease  Current Treatment:  Azathioprine 100 mg daily and Humira 40 mg weekly  Start Date:  04/07/2021 Response:  Unclear  Optimized:  Yes   Adverse reactions:  Fatigue from azathioprine-improved  Prior surgeries:  Multiple incisions and drainage of abscesses, fistulotomy; January 2017-ileocecal resection for stricturing disease  CRP Elevation: Y  Disease Complications:  Stricturing disease, perianal fistulizing disease with abscess  Extraintestinal manifestations:  None  Prior treatments:   Steroids:  Good response in the past  5ASA:  No response  IMM:   Unclear response to azathioprine-fatigue felt to be related to azathioprine-improved with time   Flu like symptoms with methotrexate  TNF Inh:   Remicade-good response, developed infusion reactions and was found to have high levels of antibodies    Humira-good response, stopped prior to surgery in 2017, restarted in April 2021   Anti-Integrin:  None   IL 12/23:  None  PAOLO Inh:  None    Previous Clinical Trials:  None    Last Colonoscopy:  July 2020 patent ileocolonic anastomosis, no active inflammation    Other Endoscopies:  Previous upper endoscopy with no significant findings    Imaging:   MRE:  Previous studies reviewed   CT:  Previous studies reviewed   Other:  Not applicable    Pertinent Labs:  Lab Results   Component Value Date    SEDRATE 36 (H) 12/19/2016    CRP 0.3 10/19/2022     No results found for: TTGIGA, IGA  No results found for: TSH, FREET4  Lab Results   Component Value Date    SLLFCBNV22BW 46 04/19/2022    KFJRDKCJ36 572 04/19/2022     Lab Results   Component Value Date    HEPBSAG Negative 04/19/2022    HEPBCAB Negative 04/19/2022    HEPCAB Negative 03/24/2021     Lab Results   Component Value  Date    PEZ47DKQY Negative 03/24/2021     Lab Results   Component Value Date    NIL 0.24333 04/19/2022    TBAG 0.067 07/05/2017    TBAGNIL 0.037 07/05/2017    MITOGENNIL 10.000 04/19/2022    TBGOLD Negative 07/05/2017     Lab Results   Component Value Date    TPTMINTERP SEE BELOW 03/24/2021     Lab Results   Component Value Date    STOOLCULTURE  09/07/2016     No Salmonella,Shigella,Vibrio,Campylobacter,Yersinia isolated.    CNPGVDCPVF6L Negative 09/07/2016    LGSSFMTRWS0C Negative 09/07/2016    CDIFFICILEAN Negative 09/07/2016    CDIFFTOX Negative 09/07/2016     Lab Results   Component Value Date    CALPROTECTIN 257.8 (H) 01/05/2022       Therapeutic Drug Monitoring Labs:  No results found for: PROMETH  No results found for: ANSADAINIT, INFLIXIMAB, INFLIXINTERP    Vaccinations:  Lab Results   Component Value Date    HEPBSAB Negative 03/24/2021     Lab Results   Component Value Date    HEPAIGG Negative 03/24/2021     Lab Results   Component Value Date    VARICELLAZOS 1.75 (H) 03/24/2021    VARICELLAINT Positive (A) 03/24/2021     Immunization History   Administered Date(s) Administered    Hepatitis A / Hepatitis B 06/18/2015    Hepatitis A, Adult 09/19/2014    Hepatitis B (recombinant) Adjuvanted, 2 dose 04/25/2022    Influenza 03/25/2013    Influenza - Quadrivalent 12/03/2015    Influenza - Trivalent (ADULT) 03/25/2013    Influenza Split 03/25/2013    Pneumococcal Conjugate - 13 Valent 09/05/2014    Tdap 06/18/2015    Zoster Recombinant 04/25/2022         Review of Systems  Review of Systems   Constitutional:  Negative for chills, fever and weight loss.   HENT:  Negative for sore throat.    Eyes:  Negative for pain, discharge and redness.   Respiratory:  Negative for cough, shortness of breath and wheezing.    Cardiovascular:  Negative for chest pain and leg swelling.   Gastrointestinal:  Negative for abdominal pain, blood in stool, constipation, diarrhea, heartburn, melena, nausea and vomiting.   Genitourinary:   Negative for dysuria and frequency.   Musculoskeletal:  Negative for back pain, joint pain and myalgias.   Skin:  Negative for rash.   Neurological:  Negative for focal weakness and seizures.   Endo/Heme/Allergies:  Does not bruise/bleed easily.   Psychiatric/Behavioral:  Negative for depression. The patient is not nervous/anxious.            Medical History:   Past Medical History:   Diagnosis Date    Allergy     seasonal    Crohn disease     Crohn's disease     DDD (degenerative disc disease), cervical     Joint pain     Keloid cicatrix     Ulcer        Surgical History:  Past Surgical History:   Procedure Laterality Date    ABCESS DRAINAGE      APPENDECTOMY      COLON SURGERY  2017    Laparoscopic ileocolic resection    COLONOSCOPY N/A 12/5/2016    Procedure: COLONOSCOPY;  Surgeon: Steven Kerr MD;  [facility]  Service: Endoscopy;  Laterality: N/A;    COLONOSCOPY N/A 7/30/2020    Procedure: COLONOSCOPY;  Surgeon: Steven Kerr MD;  [facility]  Service: Endoscopy;  Laterality: N/A;  covid 7/27/20-[facility] [place] [place]2nd floor-BB  instructions given to patient in person-BB.7/28 Called pt to come in the morning. Cannot come in early. EC    DIGITAL RECTAL EXAMINATION UNDER ANESTHESIA N/A 3/3/2021    Procedure: EXAM UNDER ANESTHESIA, DIGITAL, RECTUM;  Surgeon: Pete Diaz MD;  [facility];  Service: Colon and Rectal;  Laterality: N/A;    ESOPHAGOGASTRODUODENOSCOPY N/A 7/30/2020    Procedure: EGD (ESOPHAGOGASTRODUODENOSCOPY);  Surgeon: Steven Kerr MD;  [facility]  Service: Endoscopy;  Laterality: N/A;  covid 7/27/20-[facility] [place] [place]-2nd floor-BB  instructions given to patient in person-BB    EXAMINATION UNDER ANESTHESIA N/A 7/17/2020    Procedure: EXAM UNDER ANESTHESIA;  Surgeon: Pete Diaz MD;  Location: [facility] OR [address] [place] [address];  Service: Colon and Rectal;  Laterality: N/A;    INCISION AND DRAINAGE OF ABSCESS N/A 3/3/2021    Procedure: INCISION AND DRAINAGE,  ABSCESS;  Surgeon: Pete Diaz MD;  Location: [facility] OR 2ND FLR;  Service: Colon and Rectal;  Laterality: N/A;    INSERTION OF SETON STITCH N/A 3/3/2021    Procedure: PLACEMENT, SETON STITCH;  Surgeon: Pete Diaz MD;  Location: [facility] OR 2ND FLR;  Service: Colon and Rectal;  Laterality: N/A;    LAPAROSCOPIC RESECTION OF SMALL INTESTINE      RECTAL FISTULOTOMY N/A 7/17/2020    Procedure: FISTULOTOMY, RECTUM;  Surgeon: Pete Diaz MD;  Location: [facility] OR 2ND FLR;  Service: Colon and Rectal;  Laterality: N/A;       Family History:   Family History   Problem Relation Age of Onset    Arthritis Father     Cancer Maternal Grandmother     Melanoma Neg Hx     Psoriasis Neg Hx     Lupus Neg Hx     Eczema Neg Hx     Acne Neg Hx     Colon cancer Neg Hx     Rectal cancer Neg Hx     Stomach cancer Neg Hx     Crohn's disease Neg Hx     Esophageal cancer Neg Hx     Irritable bowel syndrome Neg Hx     Ulcerative colitis Neg Hx     Celiac disease Neg Hx        Social History:   Social History     Tobacco Use    Smoking status: Never    Smokeless tobacco: Never   Substance Use Topics    Alcohol use: No    Drug use: No       Allergies: Reviewed    Home Medications:   Medication List with Changes/Refills   Current Medications    ADALIMUMAB (HUMIRA,CF, PEN) 40 MG/0.4 ML PNKT    INJECT 1 PEN UNDER THE SKIN EVERY 7 DAYS.    AZATHIOPRINE (IMURAN) 50 MG TAB    TAKE 2 TABLETS(100 MG) BY MOUTH EVERY DAY    CELECOXIB (CELEBREX) 200 MG CAPSULE    TAKE 1 CAPSULE(200 MG) BY MOUTH TWICE DAILY       Physical Exam:  Vital Signs:  /82 (BP Location: Right arm, Patient Position: Sitting)   Pulse 74   Temp 98.1 °F (36.7 °C)   Wt 82.9 kg (182 lb 12.2 oz)   SpO2 99%   BMI 24.79 kg/m²   Body mass index is 24.79 kg/m².    Physical Exam  Constitutional:       General: He is not in acute distress.     Appearance: Normal appearance. He is well-developed. He is not ill-appearing or toxic-appearing.   Cardiovascular:      Rate and Rhythm: Normal  rate and regular rhythm.      Heart sounds: Normal heart sounds.   Pulmonary:      Effort: Pulmonary effort is normal.      Breath sounds: Normal breath sounds.   Abdominal:      General: Bowel sounds are normal. There is no distension.      Palpations: Abdomen is soft.      Tenderness: There is no abdominal tenderness.   Musculoskeletal:      Right lower leg: No edema.      Left lower leg: No edema.   Skin:     General: Skin is warm and dry.      Coloration: Skin is not pale.      Findings: No erythema or rash.   Neurological:      General: No focal deficit present.      Mental Status: He is alert and oriented to person, place, and time.   Psychiatric:         Mood and Affect: Mood normal.         Behavior: Behavior normal.         Thought Content: Thought content normal.         Judgment: Judgment normal.       Labs: reviewed and pertinent noted above    Assessment/Plan:  Escobar Toro is a 43 y.o. male with ileal and perianal Crohn's disease with perianal fistulizing disease with recent abscess. The following issues were addresssed:    1. Crohn's disease of both small and large intestine with fistula    2. Anal fistula      1. Crohn's disease:  He continues doing fairly well.  He isn't ready to schedule his colonoscopy at this time but we did give him information regarding financial assistance programs that might help get the colonoscopy scheduled.  In the meantime we will update labs today.  I also advised him to submit a stool calprotectin level so we can evaluate if there is any signs of active disease.  We have not repeated this in the past because he had ongoing anal fistula issues and a seton placed but now that the seton has been removed and he is not having issues with the fistula I think it is worthwhile.  Ultimately we will need to schedule a colonoscopy whenever he is able to.  In the meantime continue Humira and azathioprine.    2. Perianal abscess:  Setons removed.  Continue to monitor.    3.  Arthralgias/shoulder pain:  Significantly better with Humira.        # IBD specific health maintenance:  Colon cancer surveillance:  Up-to-date-minimal colonic involvement    Annual:  - Eye exam:  Discuss in the future  - Skin exam (if on IMM/TNF):  Recommend annual exam-referral placed today  - reminded pt to use sunblock/hats/sunprotective clothing  - PAP (if immunosuppressed):  Not applicable    DEXA:  Not applicable    Vitamin D:  Recent level was normal    Vaccines:    Influenza:  Recommend annual vaccination   Pneumovax:     PCV13:  Completed    PSV23:  Recommend vaccinating   HAV:  Needs vaccination   HBV:  Needs vaccination   Tdap:  Up-to-date-done in 2015   MMR:  Up-to-date   VZV:  Immune   HZV:  Recommend vaccination   HPV:  Not applicable   Meningococcus:  Not applicable   COVID:  Recommend vaccination    Follow up: Follow up in about 6 months (around 12/26/2023).    Thank you again for sending Escobar Uriartesaritha Toro to see Dr. Gutierrez Stephen today at the [facility]. Please don't hesitate to contact Dr. Stephen if there are any questions regarding this evaluation, or if you have any other patients with inflammatory bowel disease for whom you would like a consultation. You can reach Dr. Stephen at [phone] or by email at [email or web address]    Terry Stephen MD  Department of Gastroenterology  Inflammatory Bowel Disease

## 2023-06-26 NOTE — PROGRESS NOTES
IBD PATIENT INTAKE:    COVID symptoms in the last 7 days (runny nose, sore throat, congestion, cough, fever): No  PCP: Primary Doctor No  If not PCP-  number given to establish [phone]: N/A    ALLERGIES REVIEWED:  Yes    CHIEF COMPLAINT:    Chief Complaint   Patient presents with    Crohn's Disease       VITAL SIGNS:  /82 (BP Location: Right arm, Patient Position: Sitting)   Pulse 74   Temp 98.1 °F (36.7 °C)   Wt 82.9 kg (182 lb 12.2 oz)   SpO2 99%   BMI 24.79 kg/m²      Change in medical, surgical, family or social history: No    IBD THERAPY (name, dose/frequency):  Humira q7d Imuran 100mg  Last dose:  6/22    Next dose:  6/29  Infusion/Pharmacy: [facility]    Vitamins (be sure this has been put in medication list):   Vit D:  no     Vit B-12:  no   Folic Acid: no       Calcium: no     Iron:  no      MVI: no    Antibiotics (past 30 Days):  No  If yes   Indication:  Name of antibiotic:  Completion date:     REVIEWED MEDICATION LIST RECONCILED INCLUDING ABOVE MEDS:  Yes

## 2023-06-26 NOTE — TELEPHONE ENCOUNTER
IBD medications Humira Imuran    Refill request for Humira     Allergies reviewed Yes    Drug Monitoring labs/frequency for all IBD meds:   CBC and CMP every 3 months  TB Gold, HBsAG and HBcAb yearly       Lab Results   Component Value Date    HEPBSAG Non-reactive 06/26/2023    HEPBCAB Non-reactive 06/26/2023     Lab Results   Component Value Date    TBGOLDPLUS Negative 04/19/2022     Lab Results   Component Value Date    HNADNHXN72CQ 46 04/19/2022    VCBSJYFO50 572 04/19/2022     Lab Results   Component Value Date    WBC 4.80 06/26/2023    HGB 13.5 (L) 06/26/2023    HCT 41.4 06/26/2023    MCV 93 06/26/2023     06/26/2023     Lab Results   Component Value Date    CREATININE 1.2 06/26/2023    ALBUMIN 4.0 06/26/2023    BILITOT 1.2 (H) 06/26/2023    ALKPHOS 80 06/26/2023    AST 14 06/26/2023    ALT 11 06/26/2023       Lab due date (mo/yr):  9/2023    Labs scheduled: No    Next appt: 01/08/2023    RX refill sent to provider for amount until next labs: Yes

## 2023-06-27 LAB
GAMMA INTERFERON BACKGROUND BLD IA-ACNC: 0 IU/ML
M TB IFN-G CD4+ BCKGRND COR BLD-ACNC: 0.02 IU/ML
MITOGEN IGNF BCKGRD COR BLD-ACNC: 10 IU/ML
TB GOLD PLUS: NEGATIVE
TB2 - NIL: 0.03 IU/ML

## 2023-06-29 ENCOUNTER — LAB VISIT (OUTPATIENT)
Dept: LAB | Facility: HOSPITAL | Age: 43
End: 2023-06-29
Attending: INTERNAL MEDICINE
Payer: COMMERCIAL

## 2023-06-29 DIAGNOSIS — K50.813 CROHN'S DISEASE OF BOTH SMALL AND LARGE INTESTINE WITH FISTULA: ICD-10-CM

## 2023-06-29 PROCEDURE — 83993 ASSAY FOR CALPROTECTIN FECAL: CPT | Performed by: INTERNAL MEDICINE

## 2023-07-06 LAB — CALPROTECTIN STL-MCNT: <27.1 MCG/G

## 2023-08-31 DIAGNOSIS — K50.813 CROHN'S DISEASE OF BOTH SMALL AND LARGE INTESTINE WITH FISTULA: ICD-10-CM

## 2023-08-31 RX ORDER — AZATHIOPRINE 50 MG/1
TABLET ORAL
Qty: 60 TABLET | Refills: 2 | Status: SHIPPED | OUTPATIENT
Start: 2023-08-31 | End: 2023-12-11

## 2023-09-18 ENCOUNTER — TELEPHONE (OUTPATIENT)
Dept: GASTROENTEROLOGY | Facility: CLINIC | Age: 43
End: 2023-09-18
Payer: COMMERCIAL

## 2023-09-18 NOTE — TELEPHONE ENCOUNTER
PA Renewal submitted via [facility]  Key: [id number]  Will follow up on approval

## 2023-10-09 DIAGNOSIS — K50.813 CROHN'S DISEASE OF BOTH SMALL AND LARGE INTESTINE WITH FISTULA: ICD-10-CM

## 2023-10-09 NOTE — TELEPHONE ENCOUNTER
IBD medications Humira 40 mg Q 7 days, Imuran 100 mg QD    Refill request for Humira 40 mg Q 7 days    Allergies reviewed Yes    Drug Monitoring labs/frequency for all IBD meds:    CBC / CMP Q 3 months  TB Quantiferon Yearly  HBsAg/ HBcAb Yearly    Lab Results   Component Value Date    HEPBSAG Non-reactive 06/26/2023    HEPBCAB Non-reactive 06/26/2023     Lab Results   Component Value Date    TBGOLDPLUS Negative 06/26/2023     Lab Results   Component Value Date    NKYSSOGL71AC 46 04/19/2022    PFVNTKOR03 572 04/19/2022     Lab Results   Component Value Date    WBC 4.80 06/26/2023    HGB 13.5 (L) 06/26/2023    HCT 41.4 06/26/2023    MCV 93 06/26/2023     06/26/2023     Lab Results   Component Value Date    CREATININE 1.2 06/26/2023    ALBUMIN 4.0 06/26/2023    BILITOT 1.2 (H) 06/26/2023    ALKPHOS 80 06/26/2023    AST 14 06/26/2023    ALT 11 06/26/2023       Lab due date (mo/yr):  9/2023    Labs scheduled: Yes    Next appt: 1/8/2024    RX refill sent to provider for amount until next labs: Yes

## 2023-10-10 ENCOUNTER — LAB VISIT (OUTPATIENT)
Dept: LAB | Facility: HOSPITAL | Age: 43
End: 2023-10-10
Attending: INTERNAL MEDICINE
Payer: COMMERCIAL

## 2023-10-10 DIAGNOSIS — K50.813 CROHN'S DISEASE OF BOTH SMALL AND LARGE INTESTINE WITH FISTULA: ICD-10-CM

## 2023-10-10 LAB
ALBUMIN SERPL BCP-MCNC: 3.9 G/DL (ref 3.5–5.2)
ALP SERPL-CCNC: 75 U/L (ref 55–135)
ALT SERPL W/O P-5'-P-CCNC: 14 U/L (ref 10–44)
ANION GAP SERPL CALC-SCNC: 8 MMOL/L (ref 8–16)
AST SERPL-CCNC: 15 U/L (ref 10–40)
BASOPHILS # BLD AUTO: 0.04 K/UL (ref 0–0.2)
BASOPHILS NFR BLD: 0.8 % (ref 0–1.9)
BILIRUB SERPL-MCNC: 1 MG/DL (ref 0.1–1)
BUN SERPL-MCNC: 21 MG/DL (ref 6–20)
CALCIUM SERPL-MCNC: 9.4 MG/DL (ref 8.7–10.5)
CHLORIDE SERPL-SCNC: 106 MMOL/L (ref 95–110)
CO2 SERPL-SCNC: 27 MMOL/L (ref 23–29)
CREAT SERPL-MCNC: 0.9 MG/DL (ref 0.5–1.4)
DIFFERENTIAL METHOD: ABNORMAL
EOSINOPHIL # BLD AUTO: 0.1 K/UL (ref 0–0.5)
EOSINOPHIL NFR BLD: 2.9 % (ref 0–8)
ERYTHROCYTE [DISTWIDTH] IN BLOOD BY AUTOMATED COUNT: 12 % (ref 11.5–14.5)
EST. GFR  (NO RACE VARIABLE): >60 ML/MIN/1.73 M^2
GLUCOSE SERPL-MCNC: 93 MG/DL (ref 70–110)
HCT VFR BLD AUTO: 40.7 % (ref 40–54)
HGB BLD-MCNC: 13.4 G/DL (ref 14–18)
IMM GRANULOCYTES # BLD AUTO: 0.02 K/UL (ref 0–0.04)
IMM GRANULOCYTES NFR BLD AUTO: 0.4 % (ref 0–0.5)
LYMPHOCYTES # BLD AUTO: 1.5 K/UL (ref 1–4.8)
LYMPHOCYTES NFR BLD: 30.1 % (ref 18–48)
MCH RBC QN AUTO: 30.5 PG (ref 27–31)
MCHC RBC AUTO-ENTMCNC: 32.9 G/DL (ref 32–36)
MCV RBC AUTO: 93 FL (ref 82–98)
MONOCYTES # BLD AUTO: 0.7 K/UL (ref 0.3–1)
MONOCYTES NFR BLD: 13.5 % (ref 4–15)
NEUTROPHILS # BLD AUTO: 2.6 K/UL (ref 1.8–7.7)
NEUTROPHILS NFR BLD: 52.3 % (ref 38–73)
NRBC BLD-RTO: 0 /100 WBC
PLATELET # BLD AUTO: 247 K/UL (ref 150–450)
PMV BLD AUTO: 10.5 FL (ref 9.2–12.9)
POTASSIUM SERPL-SCNC: 4.3 MMOL/L (ref 3.5–5.1)
PROT SERPL-MCNC: 7.8 G/DL (ref 6–8.4)
RBC # BLD AUTO: 4.39 M/UL (ref 4.6–6.2)
SODIUM SERPL-SCNC: 141 MMOL/L (ref 136–145)
WBC # BLD AUTO: 4.89 K/UL (ref 3.9–12.7)

## 2023-10-10 PROCEDURE — 36415 COLL VENOUS BLD VENIPUNCTURE: CPT | Performed by: INTERNAL MEDICINE

## 2023-10-10 PROCEDURE — 80053 COMPREHEN METABOLIC PANEL: CPT | Performed by: INTERNAL MEDICINE

## 2023-10-10 PROCEDURE — 85025 COMPLETE CBC W/AUTO DIFF WBC: CPT | Performed by: INTERNAL MEDICINE

## 2023-10-10 RX ORDER — ADALIMUMAB 40MG/0.4ML
KIT SUBCUTANEOUS
Qty: 4 PEN | Refills: 3 | Status: SHIPPED | OUTPATIENT
Start: 2023-10-10 | End: 2024-01-08

## 2023-12-11 DIAGNOSIS — K50.813 CROHN'S DISEASE OF BOTH SMALL AND LARGE INTESTINE WITH FISTULA: ICD-10-CM

## 2023-12-11 RX ORDER — AZATHIOPRINE 50 MG/1
TABLET ORAL
Qty: 60 TABLET | Refills: 0 | Status: SHIPPED | OUTPATIENT
Start: 2023-12-11 | End: 2024-01-11

## 2023-12-11 NOTE — TELEPHONE ENCOUNTER
IBD medications Azathioprine 100 mg daily and Humira 40 mg weekly    Refill request for Azathioprine 100 mg    Allergies reviewed Yes    Drug Monitoring labs/frequency for all IBD meds:  CBC, CMP - q 3 months  TB, HEP B - Yearly    Lab Results   Component Value Date    HEPBSAG Non-reactive 06/26/2023    HEPBCAB Non-reactive 06/26/2023     Lab Results   Component Value Date    TBGOLDPLUS Negative 06/26/2023     Lab Results   Component Value Date    QUANTIFERON Negative 12/17/2013      Lab Results   Component Value Date    FNDVOCRM32YZ 46 04/19/2022    YEOJCGQN83 572 04/19/2022     Lab Results   Component Value Date    WBC 4.89 10/10/2023    HGB 13.4 (L) 10/10/2023    HCT 40.7 10/10/2023    MCV 93 10/10/2023     10/10/2023     Lab Results   Component Value Date    CREATININE 0.9 10/10/2023    ALBUMIN 3.9 10/10/2023    BILITOT 1.0 10/10/2023    ALKPHOS 75 10/10/2023    AST 15 10/10/2023    ALT 14 10/10/2023       Lab due date (mo/yr):  01/2024    Labs scheduled: No    Next appt: 01/08/2024    RX refill sent to provider for amount until next labs: Yes

## 2024-01-08 ENCOUNTER — TELEPHONE (OUTPATIENT)
Dept: ENDOSCOPY | Facility: HOSPITAL | Age: 44
End: 2024-01-08
Payer: COMMERCIAL

## 2024-01-08 ENCOUNTER — OFFICE VISIT (OUTPATIENT)
Dept: GASTROENTEROLOGY | Facility: CLINIC | Age: 44
End: 2024-01-08
Payer: COMMERCIAL

## 2024-01-08 VITALS
SYSTOLIC BLOOD PRESSURE: 132 MMHG | HEIGHT: 72 IN | HEART RATE: 78 BPM | TEMPERATURE: 99 F | WEIGHT: 186.75 LBS | OXYGEN SATURATION: 98 % | BODY MASS INDEX: 25.29 KG/M2 | DIASTOLIC BLOOD PRESSURE: 89 MMHG

## 2024-01-08 DIAGNOSIS — D84.821 IMMUNOSUPPRESSION DUE TO DRUG THERAPY: ICD-10-CM

## 2024-01-08 DIAGNOSIS — Z12.12 ENCOUNTER FOR COLORECTAL CANCER SCREENING: Primary | ICD-10-CM

## 2024-01-08 DIAGNOSIS — Z79.899 IMMUNOSUPPRESSION DUE TO DRUG THERAPY: ICD-10-CM

## 2024-01-08 DIAGNOSIS — K50.919 CROHN'S DISEASE WITH COMPLICATION, UNSPECIFIED GASTROINTESTINAL TRACT LOCATION: Primary | ICD-10-CM

## 2024-01-08 DIAGNOSIS — Z12.11 ENCOUNTER FOR COLORECTAL CANCER SCREENING: Primary | ICD-10-CM

## 2024-01-08 DIAGNOSIS — K50.813 CROHN'S DISEASE OF BOTH SMALL AND LARGE INTESTINE WITH FISTULA: Primary | ICD-10-CM

## 2024-01-08 DIAGNOSIS — K59.09 CONSTIPATION, CHRONIC: ICD-10-CM

## 2024-01-08 PROCEDURE — 3075F SYST BP GE 130 - 139MM HG: CPT | Mod: CPTII,S$GLB,, | Performed by: INTERNAL MEDICINE

## 2024-01-08 PROCEDURE — 99214 OFFICE O/P EST MOD 30 MIN: CPT | Mod: S$GLB,,, | Performed by: INTERNAL MEDICINE

## 2024-01-08 PROCEDURE — 1160F RVW MEDS BY RX/DR IN RCRD: CPT | Mod: CPTII,S$GLB,, | Performed by: INTERNAL MEDICINE

## 2024-01-08 PROCEDURE — 3079F DIAST BP 80-89 MM HG: CPT | Mod: CPTII,S$GLB,, | Performed by: INTERNAL MEDICINE

## 2024-01-08 PROCEDURE — 1159F MED LIST DOCD IN RCRD: CPT | Mod: CPTII,S$GLB,, | Performed by: INTERNAL MEDICINE

## 2024-01-08 PROCEDURE — 3008F BODY MASS INDEX DOCD: CPT | Mod: CPTII,S$GLB,, | Performed by: INTERNAL MEDICINE

## 2024-01-08 RX ORDER — ADALIMUMAB 40MG/0.4ML
40 KIT SUBCUTANEOUS WEEKLY
Qty: 12 PEN | Refills: 1 | Status: SHIPPED | OUTPATIENT
Start: 2024-01-08 | End: 2024-02-07

## 2024-01-08 NOTE — PROGRESS NOTES
[facility]          Inflammatory Bowel Disease Follow Up Note         TODAY'S VISIT DATE:  1/8/2024    Reason for Consult:    Chief Complaint   Patient presents with    Crohn's Disease       PCP: Hemalatha, Primary Doctor      Referring MD:   No ref. provider found    History of Present Illness:  Escobar Toro who is a 43 y.o. male is being seen today at the [facility] on 01/08/2024 for inflammatory bowel disease- Crohn's disease.  After his last visit he submitted a stool calprotectin level.  This was undetectable.  Over the last few months he has been having an increase in GI symptoms.  Last week his symptoms were more severe than they had been previously.  He developed right-sided and left-sided abdominal pain as well as constipation and a low-grade fever.  He took laxatives for 3 days after he had not had a bowel movement for about 5 days.  Eventually his bowels started moving in his abdominal pain started improving.  There was no blood in the stools.  He has been having intermittent constipation issues as well as sometimes diarrhea issues.  He continues to take azathioprine 100 mg daily and Humira 40 mg weekly.  He is ready to schedule his colonoscopy in the near future.    IBD History:  He is here for management of Crohn's disease.  He was diagnosed with Crohn's disease around 1998. He reported symptoms for about 6 years prior to the diagnosis.  He was initially managed by Dr. Delcid at [facility].  He was initially treated with steroids as well as multiple aminosalicylates without any improvement.  Subsequently, Remicade was started and he took this from 0788-1185 but this was stopped in 2009 because of development of antibodies and infusion reactions.  He was then started on Humira.  He began seeing [facility] around 2013. In June of 2014 he was admitted to the hospital with symptoms of active Crohn's disease and imaging at  that time showed a stricture in the terminal ileum.  He was managed with steroids and antibiotics and Humira was maintained after that.  It was noted that he had had some issues with pustular lesions of the skin when taking Humira and was seeing Dermatology for this issue.  He was found to have MRSA and underwent treatment for folliculitis.  In September 2016 he presented again with nausea, vomiting, and diarrhea.  A repeat CT scan is again revealed narrowing of the terminal ileum with chronic changes from prior inflammatory episodes but no findings suggestive of active inflammation.  He was managed conservatively at that time and underwent colonoscopy on December 5, 2016. At that time there was no evidence of active inflammation but a severe stricture of the terminal ileum was identified.  He was referred to Dr. Diaz with Colorectal surgery and underwent ileocolonic resection on January 4, 2017. He was supposed to restart Humira after surgery but because of insurance reasons it was not started.  At that time he had developed a recurrent perianal abscess that resolved.  Because of his social situation he did not he did not follow up after 2018. He presented again in July of 2020 with complaints of swelling and purulent drainage in the perianal area.  He had been off of medication for quite some time.  He saw Dr. Diaz because of these issues and was scheduled for exam under anesthesia in July 2020. At that time of fistulotomy was performed.  A colonoscopy done July 30, 2020 did not reveal any active inflammation and revealed a patent anastomosis.  An adalimumab level and antibodies was performed August (when he had been off of therapy for over 3 years) and there were no antibodies present.  The plan was to restart Humira but this was never done.  He presented again March 1, 2021 with perianal pain and purulence/brownish discharge from the anal canal and was again found to have a perianal abscess.  He underwent exam  "under anesthesia with seton placement on March 4th.  He had a CT scan prior to surgery that did not reveal any evidence of significant inflammation within the bowel.  He started azathioprine and Humira on April 7, 2021. In September 2021 we increased the dose of Humira to once weekly.  In the summer of 2023 a follow-up stool calprotectin level was undetectable.    IBD Details:  Dx Date:  1998-symptoms since 1992  Disease type/distribution:  Crohn's disease/ileal involvement and perianal disease  Current Treatment:  Azathioprine 100 mg daily and Humira 40 mg weekly  Start Date:  04/07/2021 Response:  Unclear  Optimized:  Yes   Adverse reactions:  Fatigue from azathioprine-improved  Prior surgeries:  Multiple incisions and drainage of abscesses, fistulotomy; January 2017-ileocecal resection for stricturing disease  CRP Elevation: Y  Disease Complications:  Stricturing disease, perianal fistulizing disease with abscess  Extraintestinal manifestations:  None  Prior treatments:   Steroids:  Good response in the past  5ASA:  No response  IMM:   Unclear response to azathioprine-fatigue felt to be related to azathioprine-improved with time   Flu like symptoms with methotrexate  TNF Inh:   Remicade-good response, developed infusion reactions and was found to have high levels of antibodies    Humira-good response, stopped prior to surgery in 2017, restarted in April 2021   Anti-Integrin:  None   IL 12/23:  None  PAOLO Inh:  None    Previous Clinical Trials:  None    Last Colonoscopy:  July 2020 patent ileocolonic anastomosis, no active inflammation    Other Endoscopies:  Previous upper endoscopy with no significant findings    Imaging:   MRE:  Previous studies reviewed   CT:  Previous studies reviewed   Other:  Not applicable    Pertinent Labs:  Lab Results   Component Value Date    SEDRATE 36 (H) 12/19/2016    CRP 0.4 06/26/2023     No results found for: "TTGIGA", "IGA"  No results found for: "TSH", "FREET4"  Lab Results " "  Component Value Date    FFCKBRWK11DI 46 04/19/2022    XPCALTUM72 572 04/19/2022     Lab Results   Component Value Date    HEPBSAG Non-reactive 06/26/2023    HEPBCAB Non-reactive 06/26/2023    HEPCAB Negative 03/24/2021     Lab Results   Component Value Date    BQT77QDRY Negative 03/24/2021     Lab Results   Component Value Date    NIL 0.84154 06/26/2023    TBAG 0.067 07/05/2017    TBAGNIL 0.037 07/05/2017    MITOGENNIL 10.000 06/26/2023    TBGOLD Negative 07/05/2017     Lab Results   Component Value Date    TPTMINTERP SEE BELOW 03/24/2021     Lab Results   Component Value Date    STOOLCULTURE  09/07/2016     No Salmonella,Shigella,Vibrio,Campylobacter,Yersinia isolated.    FSPPXOPKSV1D Negative 09/07/2016    RTVQUCJVJM5C Negative 09/07/2016    CDIFFICILEAN Negative 09/07/2016    CDIFFTOX Negative 09/07/2016     Lab Results   Component Value Date    CALPROTECTIN <27.1 06/29/2023       Therapeutic Drug Monitoring Labs:  No results found for: "PROMETH"  No results found for: "ANSADAINIT", "INFLIXIMAB", "INFLIXINTERP"    Vaccinations:  Lab Results   Component Value Date    HEPBSAB Negative 03/24/2021     Lab Results   Component Value Date    HEPAIGG Negative 03/24/2021     Lab Results   Component Value Date    VARICELLAZOS 1.75 (H) 03/24/2021    VARICELLAINT Positive (A) 03/24/2021     Immunization History   Administered Date(s) Administered    Hepatitis A / Hepatitis B 06/18/2015    Hepatitis A, Adult 09/19/2014    Hepatitis B (recombinant) Adjuvanted, 2 dose 04/25/2022    Influenza 03/25/2013    Influenza - Quadrivalent 12/03/2015    Influenza - Trivalent (ADULT) 03/25/2013    Influenza Split 03/25/2013    Pneumococcal Conjugate - 13 Valent 09/05/2014    Tdap 06/18/2015    Zoster Recombinant 04/25/2022         Review of Systems  Review of Systems   Constitutional:  Negative for chills, fever and weight loss.   HENT:  Negative for sore throat.    Eyes:  Negative for pain, discharge and redness.   Respiratory:  " Negative for cough, shortness of breath and wheezing.    Cardiovascular:  Negative for chest pain and leg swelling.   Gastrointestinal:  Negative for abdominal pain, blood in stool, constipation, diarrhea, heartburn, melena, nausea and vomiting.   Genitourinary:  Negative for dysuria and frequency.   Musculoskeletal:  Negative for back pain, joint pain and myalgias.   Skin:  Negative for rash.   Neurological:  Negative for focal weakness and seizures.   Endo/Heme/Allergies:  Does not bruise/bleed easily.   Psychiatric/Behavioral:  Negative for depression. The patient is not nervous/anxious.            Medical History:   Past Medical History:   Diagnosis Date    Allergy     seasonal    Crohn disease     Crohn's disease     DDD (degenerative disc disease), cervical     Joint pain     Keloid cicatrix     Ulcer        Surgical History:  Past Surgical History:   Procedure Laterality Date    ABCESS DRAINAGE      APPENDECTOMY      COLON SURGERY  2017    Laparoscopic ileocolic resection    COLONOSCOPY N/A 12/5/2016    Procedure: COLONOSCOPY;  Surgeon: Steven Kerr MD;  Location: [address]);  Service: Endoscopy;  Laterality: N/A;    COLONOSCOPY N/A 7/30/2020    Procedure: COLONOSCOPY;  Surgeon: Steven Kerr MD;  Location: [address]);  Service: Endoscopy;  Laterality: N/A;  covid 7/27/20-[facility] [place] [place]2nd [place]-BB  instructions given to patient in person-BB.7/28 Called pt to come in the morning. Cannot come in early. EC    DIGITAL RECTAL EXAMINATION UNDER ANESTHESIA N/A 3/3/2021    Procedure: EXAM UNDER ANESTHESIA, DIGITAL, RECTUM;  Surgeon: Pete Diaz MD;  Location: [address];  Service: Colon and Rectal;  Laterality: N/A;    ESOPHAGOGASTRODUODENOSCOPY N/A 7/30/2020    Procedure: EGD (ESOPHAGOGASTRODUODENOSCOPY);  Surgeon: Steven Kerr MD;  [facility]  Service: Endoscopy;  Laterality: N/A;  covid 7/27/20-[facility] [place] [place]-2nd floor-BB  instructions given to patient in person-BB     EXAMINATION UNDER ANESTHESIA N/A 7/17/2020    Procedure: EXAM UNDER ANESTHESIA;  Surgeon: Pete Diaz MD;  Location: [facility] OR 2ND FLR;  Service: Colon and Rectal;  Laterality: N/A;    INCISION AND DRAINAGE OF ABSCESS N/A 3/3/2021    Procedure: INCISION AND DRAINAGE, ABSCESS;  Surgeon: Pete Diaz MD;  Location: [facility] OR 2ND FLR;  Service: Colon and Rectal;  Laterality: N/A;    INSERTION OF SETON STITCH N/A 3/3/2021    Procedure: PLACEMENT, SETON STITCH;  Surgeon: Pete Diaz MD;  Location: [facility] OR 2ND FLR;  Service: Colon and Rectal;  Laterality: N/A;    LAPAROSCOPIC RESECTION OF SMALL INTESTINE      RECTAL FISTULOTOMY N/A 7/17/2020    Procedure: FISTULOTOMY, RECTUM;  Surgeon: Pete Diaz MD;  Location: [facility] OR 2ND FLR;  Service: Colon and Rectal;  Laterality: N/A;       Family History:   Family History   Problem Relation Age of Onset    Arthritis Father     Cancer Maternal Grandmother     Melanoma Neg Hx     Psoriasis Neg Hx     Lupus Neg Hx     Eczema Neg Hx     Acne Neg Hx     Colon cancer Neg Hx     Rectal cancer Neg Hx     Stomach cancer Neg Hx     Crohn's disease Neg Hx     Esophageal cancer Neg Hx     Irritable bowel syndrome Neg Hx     Ulcerative colitis Neg Hx     Celiac disease Neg Hx        Social History:   Social History     Tobacco Use    Smoking status: Never    Smokeless tobacco: Never   Substance Use Topics    Alcohol use: No    Drug use: No       Allergies: Reviewed    Home Medications:   Medication List with Changes/Refills   New Medications    POLYETHYLENE GLYCOL (COLYTE) 240-22.72-6.72 -5.84 GRAM SOLR    Take 4,000 mLs by mouth once. for 1 dose   Current Medications    AZATHIOPRINE (IMURAN) 50 MG TAB    TAKE 2 TABLETS(100 MG) BY MOUTH EVERY DAY    CELECOXIB (CELEBREX) 200 MG CAPSULE    TAKE 1 CAPSULE(200 MG) BY MOUTH TWICE DAILY   Changed and/or Refilled Medications    Modified Medication Previous Medication    ADALIMUMAB (HUMIRA,CF, PEN) 40 MG/0.4 ML PNKT HUMIRA,CF, PEN 40 mg/0.4 mL  PnKt       Inject 0.4 mLs (40 mg total) into the skin once a week.    INJECT 1 PEN UNDER THE SKIN EVERY 7 DAYS.       Physical Exam:  Vital Signs:  /89 (BP Location: Right arm, Patient Position: Sitting)   Pulse 78   Temp 99 °F (37.2 °C)   Ht 6' (1.829 m)   Wt 84.7 kg (186 lb 11.7 oz)   SpO2 98%   BMI 25.33 kg/m²   Body mass index is 25.33 kg/m².    Physical Exam  Constitutional:       General: He is not in acute distress.     Appearance: Normal appearance. He is well-developed. He is not ill-appearing or toxic-appearing.   Cardiovascular:      Rate and Rhythm: Normal rate and regular rhythm.      Heart sounds: Normal heart sounds.   Pulmonary:      Effort: Pulmonary effort is normal.      Breath sounds: Normal breath sounds.   Abdominal:      General: Bowel sounds are normal. There is no distension.      Palpations: Abdomen is soft.      Tenderness: There is no abdominal tenderness.   Musculoskeletal:      Right lower leg: No edema.      Left lower leg: No edema.   Skin:     General: Skin is warm and dry.      Coloration: Skin is not pale.      Findings: No erythema or rash.   Neurological:      General: No focal deficit present.      Mental Status: He is alert and oriented to person, place, and time.   Psychiatric:         Mood and Affect: Mood normal.         Behavior: Behavior normal.         Thought Content: Thought content normal.         Judgment: Judgment normal.       Labs: reviewed and pertinent noted above    Assessment/Plan:  Escobar Toro is a 43 y.o. male with ileal and perianal Crohn's disease with perianal fistulizing disease with recent abscess. The following issues were addresssed:    1. Crohn's disease of both small and large intestine with fistula    2. Immunosuppression due to drug therapy    3. Constipation, chronic      1. Crohn's disease:  He continues to do well.  We will schedule his colonoscopy in the near future.  We will also plan to update labs in the near future  including an updated Humira level.  If there is evidence of active disease we may need to optimize his dosing.  If everything is under good control we may consider stopping the azathioprine and continuing with Humira monotherapy.  It does not sound like his recent symptoms were related to Crohn's disease but it is worthwhile to check and make sure that things are under good control before assuming.    2. Perianal abscess:  Setons removed.  Continue to monitor.  No active issues.    3. Constipation:  This seems to be the primary cause of his recent symptoms.  We will check his thyroid function.  I encouraged him to drink a bottle of magnesium citrate to clear his bowels and then he can start a fiber supplement with adequate water intake to maintain adequate bowel movements.    4. Immunosuppression:  Recommend annual flu shot.      # IBD specific health maintenance:  Colon cancer surveillance:  Up-to-date-minimal colonic involvement    Annual:  - Eye exam:  Discuss in the future  - Skin exam (if on IMM/TNF):  Recommend annual exam-referral placed today  - reminded pt to use sunblock/hats/sunprotective clothing  - PAP (if immunosuppressed):  Not applicable    DEXA:  Not applicable    Vitamin D:  Recent level was normal    Vaccines:    Influenza:  Recommend annual vaccination   Pneumovax:     PCV13:  Completed    PSV23:  Recommend vaccinating   HAV:  Needs vaccination   HBV:  Needs vaccination   Tdap:  Up-to-date-done in 2015   MMR:  Up-to-date   VZV:  Immune   HZV:  Recommend vaccination   HPV:  Not applicable   Meningococcus:  Not applicable   COVID:  Recommend vaccination    Follow up: Follow up in about 6 months (around 7/8/2024).    Thank you again for sending Escobar Toro to see Dr. Gutierrez Stephen today at the [facility]. Please don't hesitate to contact Dr. Stephen if there are any questions regarding this evaluation, or if you have any other patients with inflammatory bowel  disease for whom you would like a consultation. You can reach Dr. Stephen at [phone] or by email at [email or web address]    Terry Stephen MD  Department of Gastroenterology  Inflammatory Bowel Disease

## 2024-01-08 NOTE — TELEPHONE ENCOUNTER
"----- Message from Terry Stephen MD sent at 2024  9:01 AM CST -----  Procedure: Colonoscopy    Diagnosis: Crohn's disease    Procedure Timin-12 weeks    #If within 4 weeks selected, please joyce as high priority#    #If greater than 12 weeks, please select "4-12 weeks" and delay sending until 2 months prior to requested date#     Provider: Myself    Location: [address]    Additional Scheduling Information: No scheduling concerns    Prep Specifications:Standard prep    Have you attached a patient to this message: Yes     "

## 2024-01-08 NOTE — PATIENT INSTRUCTIONS
Continue current medications  Submit stool  Labs on Thursday before injection  Colonoscopy in the near future  Mag citrate to clear your bowels  Start fiber  Follow up in 6 months

## 2024-01-08 NOTE — TELEPHONE ENCOUNTER
Spoke to patient to schedule procedure(s) Colonoscopy       Physician to perform procedure(s) Dr. LENORE Stephen  Date of Procedure (s) 2/19/24  Arrival Time 12:15 PM  Time of Procedure(s) 1:15 PM   Location of Procedure(s) [place] 4th Floor  Type of Rx Prep sent to patient: PEG  Instructions provided to patient via [facility]    Patient was informed on the following information and verbalized understanding. Screening questionnaire reviewed with patient and complete. If procedure requires anesthesia, a responsible adult needs to be present to accompany the patient home, patient cannot drive after receiving anesthesia. Appointment details are tentative, especially check-in time. Patient will receive a prep-op call 7 days prior to confirm check-in time for procedure. If applicable the patient should contact their pharmacy to verify Rx for procedure prep is ready for pick-up. Patient was advised to call the scheduling department at [phone] if pharmacy states no Rx is available. Patient was advised to call the endoscopy scheduling department if any questions or concerns arise.      [facility] Scheduling Department

## 2024-01-10 ENCOUNTER — LAB VISIT (OUTPATIENT)
Dept: LAB | Facility: HOSPITAL | Age: 44
End: 2024-01-10
Payer: COMMERCIAL

## 2024-01-10 DIAGNOSIS — K50.813 CROHN'S DISEASE OF BOTH SMALL AND LARGE INTESTINE WITH FISTULA: ICD-10-CM

## 2024-01-10 PROCEDURE — 83993 ASSAY FOR CALPROTECTIN FECAL: CPT | Performed by: INTERNAL MEDICINE

## 2024-01-11 ENCOUNTER — LAB VISIT (OUTPATIENT)
Dept: LAB | Facility: HOSPITAL | Age: 44
End: 2024-01-11
Attending: INTERNAL MEDICINE
Payer: COMMERCIAL

## 2024-01-11 DIAGNOSIS — K50.813 CROHN'S DISEASE OF BOTH SMALL AND LARGE INTESTINE WITH FISTULA: ICD-10-CM

## 2024-01-11 DIAGNOSIS — K59.09 CONSTIPATION, CHRONIC: ICD-10-CM

## 2024-01-11 LAB
25(OH)D3+25(OH)D2 SERPL-MCNC: 32 NG/ML (ref 30–96)
ALBUMIN SERPL BCP-MCNC: 3.8 G/DL (ref 3.5–5.2)
ALP SERPL-CCNC: 81 U/L (ref 55–135)
ALT SERPL W/O P-5'-P-CCNC: 16 U/L (ref 10–44)
ANION GAP SERPL CALC-SCNC: 6 MMOL/L (ref 8–16)
AST SERPL-CCNC: 14 U/L (ref 10–40)
BASOPHILS # BLD AUTO: 0.04 K/UL (ref 0–0.2)
BASOPHILS NFR BLD: 0.7 % (ref 0–1.9)
BILIRUB SERPL-MCNC: 1.2 MG/DL (ref 0.1–1)
BUN SERPL-MCNC: 19 MG/DL (ref 6–20)
CALCIUM SERPL-MCNC: 9 MG/DL (ref 8.7–10.5)
CHLORIDE SERPL-SCNC: 110 MMOL/L (ref 95–110)
CO2 SERPL-SCNC: 24 MMOL/L (ref 23–29)
CREAT SERPL-MCNC: 1.1 MG/DL (ref 0.5–1.4)
CRP SERPL-MCNC: 0.4 MG/L (ref 0–8.2)
DIFFERENTIAL METHOD BLD: ABNORMAL
EOSINOPHIL # BLD AUTO: 0.2 K/UL (ref 0–0.5)
EOSINOPHIL NFR BLD: 3.4 % (ref 0–8)
ERYTHROCYTE [DISTWIDTH] IN BLOOD BY AUTOMATED COUNT: 12.1 % (ref 11.5–14.5)
EST. GFR  (NO RACE VARIABLE): >60 ML/MIN/1.73 M^2
GLUCOSE SERPL-MCNC: 90 MG/DL (ref 70–110)
HCT VFR BLD AUTO: 40.3 % (ref 40–54)
HGB BLD-MCNC: 13.7 G/DL (ref 14–18)
IMM GRANULOCYTES # BLD AUTO: 0.02 K/UL (ref 0–0.04)
IMM GRANULOCYTES NFR BLD AUTO: 0.3 % (ref 0–0.5)
LYMPHOCYTES # BLD AUTO: 1.3 K/UL (ref 1–4.8)
LYMPHOCYTES NFR BLD: 22.4 % (ref 18–48)
MCH RBC QN AUTO: 30.1 PG (ref 27–31)
MCHC RBC AUTO-ENTMCNC: 34 G/DL (ref 32–36)
MCV RBC AUTO: 89 FL (ref 82–98)
MONOCYTES # BLD AUTO: 0.8 K/UL (ref 0.3–1)
MONOCYTES NFR BLD: 12.8 % (ref 4–15)
NEUTROPHILS # BLD AUTO: 3.6 K/UL (ref 1.8–7.7)
NEUTROPHILS NFR BLD: 60.4 % (ref 38–73)
NRBC BLD-RTO: 0 /100 WBC
PLATELET # BLD AUTO: 240 K/UL (ref 150–450)
PMV BLD AUTO: 10.2 FL (ref 9.2–12.9)
POTASSIUM SERPL-SCNC: 4 MMOL/L (ref 3.5–5.1)
PROT SERPL-MCNC: 7.7 G/DL (ref 6–8.4)
RBC # BLD AUTO: 4.55 M/UL (ref 4.6–6.2)
SODIUM SERPL-SCNC: 140 MMOL/L (ref 136–145)
TSH SERPL DL<=0.005 MIU/L-ACNC: 3.43 UIU/ML (ref 0.4–4)
WBC # BLD AUTO: 5.94 K/UL (ref 3.9–12.7)

## 2024-01-11 PROCEDURE — 84443 ASSAY THYROID STIM HORMONE: CPT | Performed by: INTERNAL MEDICINE

## 2024-01-11 PROCEDURE — 80145 DRUG ASSAY ADALIMUMAB: CPT | Performed by: INTERNAL MEDICINE

## 2024-01-11 PROCEDURE — 86140 C-REACTIVE PROTEIN: CPT | Performed by: INTERNAL MEDICINE

## 2024-01-11 PROCEDURE — 36415 COLL VENOUS BLD VENIPUNCTURE: CPT | Performed by: INTERNAL MEDICINE

## 2024-01-11 PROCEDURE — 80053 COMPREHEN METABOLIC PANEL: CPT | Performed by: INTERNAL MEDICINE

## 2024-01-11 PROCEDURE — 82306 VITAMIN D 25 HYDROXY: CPT | Performed by: INTERNAL MEDICINE

## 2024-01-11 PROCEDURE — 85025 COMPLETE CBC W/AUTO DIFF WBC: CPT | Performed by: INTERNAL MEDICINE

## 2024-01-11 RX ORDER — AZATHIOPRINE 50 MG/1
TABLET ORAL
Qty: 60 TABLET | Refills: 2 | Status: SHIPPED | OUTPATIENT
Start: 2024-01-11

## 2024-01-11 NOTE — TELEPHONE ENCOUNTER
IBD medications Humira 40 mg Q 7 days, Azathioprine 50 mg Take 2 tablets ( 100 mg ) QD    Refill request for Azathioprine 50 mg Take 2 tablets ( 100 mg ) QD    Allergies reviewed Yes    Drug Monitoring labs/frequency for all IBD meds:    CBC / CMP Q 3 months  TB Quantiferon yearly  HBsAg / HBcAb Yearly    Lab Results   Component Value Date    HEPBSAG Non-reactive 06/26/2023    HEPBCAB Non-reactive 06/26/2023     Lab Results   Component Value Date    TBGOLDPLUS Negative 06/26/2023     Lab Results   Component Value Date    QUANTIFERON Negative 12/17/2013      Lab Results   Component Value Date    DSQNMHZN77SS 32 01/11/2024    EFGHLYSF40 572 04/19/2022     Lab Results   Component Value Date    WBC 5.94 01/11/2024    HGB 13.7 (L) 01/11/2024    HCT 40.3 01/11/2024    MCV 89 01/11/2024     01/11/2024     Lab Results   Component Value Date    CREATININE 1.1 01/11/2024    ALBUMIN 3.8 01/11/2024    BILITOT 1.2 (H) 01/11/2024    ALKPHOS 81 01/11/2024    AST 14 01/11/2024    ALT 16 01/11/2024       Lab due date (mo/yr):  4/2024    Labs scheduled: No    Next appt: 7/22/2024    RX refill sent to provider for amount until next labs: Yes

## 2024-01-15 LAB — ADALIMUMAB SERPL IA-MCNC: 21.1 MCG/ML

## 2024-01-16 LAB — CALPROTECTIN STL-MCNT: <27.1 MCG/G

## 2024-01-20 ENCOUNTER — PATIENT MESSAGE (OUTPATIENT)
Dept: GASTROENTEROLOGY | Facility: CLINIC | Age: 44
End: 2024-01-20
Payer: COMMERCIAL

## 2024-02-07 ENCOUNTER — TELEPHONE (OUTPATIENT)
Dept: ENDOSCOPY | Facility: HOSPITAL | Age: 44
End: 2024-02-07
Payer: COMMERCIAL

## 2024-02-07 DIAGNOSIS — K50.813 CROHN'S DISEASE OF BOTH SMALL AND LARGE INTESTINE WITH FISTULA: ICD-10-CM

## 2024-02-07 RX ORDER — ADALIMUMAB-ADAZ 40 MG/.4ML
INJECTION, SOLUTION SUBCUTANEOUS
Qty: 4 ML | Refills: 2 | Status: SHIPPED | OUTPATIENT
Start: 2024-02-07 | End: 2024-02-12 | Stop reason: SDUPTHER

## 2024-02-07 NOTE — TELEPHONE ENCOUNTER
IBD medications Humira 40 mg Q 7 days, Imuran 50 mg Take 2 tablets ( 100 mg ) QD    Refill request for Hyrimoz 40 mg Q 7 days    Allergies reviewed Yes    Drug Monitoring labs/frequency for all IBD meds:    CBC / CMP Q 3 months  TB Quantiferon yearly  HBsAg / HBcAb yearly    Lab Results   Component Value Date    HEPBSAG Non-reactive 06/26/2023    HEPBCAB Non-reactive 06/26/2023     Lab Results   Component Value Date    TBGOLDPLUS Negative 06/26/2023     Lab Results   Component Value Date    QUANTIFERON Negative 12/17/2013      Lab Results   Component Value Date    OJEIBQOA65NT 32 01/11/2024    CROAGKPK60 572 04/19/2022     Lab Results   Component Value Date    WBC 5.94 01/11/2024    HGB 13.7 (L) 01/11/2024    HCT 40.3 01/11/2024    MCV 89 01/11/2024     01/11/2024     Lab Results   Component Value Date    CREATININE 1.1 01/11/2024    ALBUMIN 3.8 01/11/2024    BILITOT 1.2 (H) 01/11/2024    ALKPHOS 81 01/11/2024    AST 14 01/11/2024    ALT 16 01/11/2024       Lab due date (mo/yr):  4/2024    Labs scheduled: no     Next appt: 7/22/2024    RX refill sent to provider for amount until next labs: Yes

## 2024-02-12 DIAGNOSIS — K50.813 CROHN'S DISEASE OF BOTH SMALL AND LARGE INTESTINE WITH FISTULA: ICD-10-CM

## 2024-02-12 NOTE — TELEPHONE ENCOUNTER
Sent request for 90 day e- script  ----- Message from Nabila Panchal sent at 2/12/2024  3:50 PM CST -----  Regarding: Requesting More Supply  Contact: [phone] [facility] calling with [facility] states pt is requesting a 90 day supply of HYRIMOZ,CF, PEN 40 mg/0.4 mL PnIj. Please fax new script to : [phone] . Thanks

## 2024-02-14 RX ORDER — ADALIMUMAB-ADAZ 40 MG/.4ML
1 INJECTION, SOLUTION SUBCUTANEOUS
Qty: 4.8 ML | Refills: 0 | Status: SHIPPED | OUTPATIENT
Start: 2024-02-14 | End: 2024-02-16 | Stop reason: SDUPTHER

## 2024-02-16 DIAGNOSIS — K50.813 CROHN'S DISEASE OF BOTH SMALL AND LARGE INTESTINE WITH FISTULA: ICD-10-CM

## 2024-02-16 NOTE — TELEPHONE ENCOUNTER
IBD medications Azathioprine 100 mg daily and Humira 40 mg weekly     Refill request for Humira 40 mg    Allergies reviewed Yes    Drug Monitoring labs/frequency for all IBD meds:  CBC, CMP - q 6 months  TB, HEP B - Yearly    Lab Results   Component Value Date    HEPBSAG Non-reactive 06/26/2023    HEPBCAB Non-reactive 06/26/2023     Lab Results   Component Value Date    TBGOLDPLUS Negative 06/26/2023     Lab Results   Component Value Date    QUANTIFERON Negative 12/17/2013      Lab Results   Component Value Date    HINXWFMA09YH 32 01/11/2024    AGHCLDDY41 572 04/19/2022     Lab Results   Component Value Date    WBC 5.94 01/11/2024    HGB 13.7 (L) 01/11/2024    HCT 40.3 01/11/2024    MCV 89 01/11/2024     01/11/2024     Lab Results   Component Value Date    CREATININE 1.1 01/11/2024    ALBUMIN 3.8 01/11/2024    BILITOT 1.2 (H) 01/11/2024    ALKPHOS 81 01/11/2024    AST 14 01/11/2024    ALT 16 01/11/2024       Lab due date (mo/yr):  06/2024    Labs scheduled: no     Next appt: 07/22/2024    RX refill sent to provider for amount until next labs: Yes

## 2024-02-19 ENCOUNTER — ANESTHESIA (OUTPATIENT)
Dept: ENDOSCOPY | Facility: HOSPITAL | Age: 44
End: 2024-02-19
Payer: COMMERCIAL

## 2024-02-19 ENCOUNTER — ANESTHESIA EVENT (OUTPATIENT)
Dept: ENDOSCOPY | Facility: HOSPITAL | Age: 44
End: 2024-02-19
Payer: COMMERCIAL

## 2024-02-19 ENCOUNTER — HOSPITAL ENCOUNTER (OUTPATIENT)
Facility: HOSPITAL | Age: 44
Discharge: HOME | End: 2024-02-19
Attending: INTERNAL MEDICINE | Admitting: INTERNAL MEDICINE
Payer: COMMERCIAL

## 2024-02-19 VITALS
HEIGHT: 72 IN | BODY MASS INDEX: 23.7 KG/M2 | OXYGEN SATURATION: 100 % | WEIGHT: 175 LBS | HEART RATE: 66 BPM | TEMPERATURE: 98 F | RESPIRATION RATE: 16 BRPM | DIASTOLIC BLOOD PRESSURE: 77 MMHG | SYSTOLIC BLOOD PRESSURE: 120 MMHG

## 2024-02-19 DIAGNOSIS — K50.90 CROHN'S DISEASE: ICD-10-CM

## 2024-02-19 PROCEDURE — 25000003 PHARM REV CODE 250: Performed by: INTERNAL MEDICINE

## 2024-02-19 PROCEDURE — 63600175 PHARM REV CODE 636 W HCPCS: Performed by: NURSE ANESTHETIST, CERTIFIED REGISTERED

## 2024-02-19 PROCEDURE — 27201012 HC FORCEPS, HOT/COLD, DISP: Performed by: INTERNAL MEDICINE

## 2024-02-19 PROCEDURE — 45380 COLONOSCOPY AND BIOPSY: CPT | Performed by: INTERNAL MEDICINE

## 2024-02-19 PROCEDURE — E9220 PRA ENDO ANESTHESIA: HCPCS | Mod: ,,, | Performed by: NURSE ANESTHETIST, CERTIFIED REGISTERED

## 2024-02-19 PROCEDURE — 25000003 PHARM REV CODE 250: Performed by: NURSE ANESTHETIST, CERTIFIED REGISTERED

## 2024-02-19 PROCEDURE — 37000008 HC ANESTHESIA 1ST 15 MINUTES: Performed by: INTERNAL MEDICINE

## 2024-02-19 PROCEDURE — 45380 COLONOSCOPY AND BIOPSY: CPT | Mod: ,,, | Performed by: INTERNAL MEDICINE

## 2024-02-19 PROCEDURE — 88305 TISSUE EXAM BY PATHOLOGIST: CPT | Performed by: PATHOLOGY

## 2024-02-19 PROCEDURE — 88305 TISSUE EXAM BY PATHOLOGIST: CPT | Mod: 26,,, | Performed by: PATHOLOGY

## 2024-02-19 PROCEDURE — 37000009 HC ANESTHESIA EA ADD 15 MINS: Performed by: INTERNAL MEDICINE

## 2024-02-19 RX ORDER — PROPOFOL 10 MG/ML
VIAL (ML) INTRAVENOUS
Status: DISCONTINUED | OUTPATIENT
Start: 2024-02-19 | End: 2024-02-19

## 2024-02-19 RX ORDER — LIDOCAINE HYDROCHLORIDE 20 MG/ML
INJECTION INTRAVENOUS
Status: DISCONTINUED | OUTPATIENT
Start: 2024-02-19 | End: 2024-02-19

## 2024-02-19 RX ORDER — ADALIMUMAB-ADAZ 40 MG/.4ML
1 INJECTION, SOLUTION SUBCUTANEOUS
Qty: 4.8 ML | Refills: 4 | Status: SHIPPED | OUTPATIENT
Start: 2024-02-19

## 2024-02-19 RX ORDER — SODIUM CHLORIDE 9 MG/ML
INJECTION, SOLUTION INTRAVENOUS CONTINUOUS
Status: DISCONTINUED | OUTPATIENT
Start: 2024-02-19 | End: 2024-02-19 | Stop reason: HOSPADM

## 2024-02-19 RX ORDER — PROPOFOL 10 MG/ML
INJECTION, EMULSION INTRAVENOUS CONTINUOUS PRN
Status: DISCONTINUED | OUTPATIENT
Start: 2024-02-19 | End: 2024-02-19

## 2024-02-19 RX ADMIN — PROPOFOL 130 MG: 10 INJECTION, EMULSION INTRAVENOUS at 01:02

## 2024-02-19 RX ADMIN — SODIUM CHLORIDE: 0.9 INJECTION, SOLUTION INTRAVENOUS at 11:02

## 2024-02-19 RX ADMIN — PROPOFOL 200 MCG/KG/MIN: 10 INJECTION, EMULSION INTRAVENOUS at 01:02

## 2024-02-19 RX ADMIN — LIDOCAINE HYDROCHLORIDE 100 MG: 20 INJECTION INTRAVENOUS at 01:02

## 2024-02-19 RX ADMIN — PROPOFOL 50 MG: 10 INJECTION, EMULSION INTRAVENOUS at 01:02

## 2024-02-19 NOTE — TRANSFER OF CARE
Anesthesia Transfer of Care Note    Patient: Escobar Toro    Procedure(s) Performed: Procedure(s) (LRB):  COLONOSCOPY (N/A)    Patient location: GI    Anesthesia Type: general    Transport from OR: Transported from OR on room air with adequate spontaneous ventilation    Post pain: adequate analgesia    Post assessment: no apparent anesthetic complications    Post vital signs: stable    Level of consciousness: responds to stimulation and sedated    Nausea/Vomiting: no nausea/vomiting    Complications: none    Transfer of care protocol was followed      Last vitals: Visit Vitals  /59   Pulse 85   Temp 98.1   Resp 14   Ht    Wt    SpO2 97%   BMI

## 2024-02-19 NOTE — H&P
Short Stay Endoscopy History and Physical    PCP - No, Primary Doctor    Procedure - Colonoscopy  ASA - 2  Mallampati - per anesthesia  History of Anesthesia problems - no  Family history Anesthesia problems -  no     HPI:  This is a 43 y.o. male here for evaluation of :     Average Risk Screening: No  High risk screening: No  History of polyps: No  Anemia: No  Blood in stools: No  Diarrhea: No  Abdominal Pain: No  Other: Crohn's disease    Review of Systems:  CONSTITUTIONAL: Denies weight change,  fatigue, fevers, chills, night sweats.  CARDIOVASCULAR: Denies chest pain, shortness of breath, orthopnea and edema.  RESPIRATORY: Denies cough, hemoptysis, dyspnea, and wheezing.  GI: See HPI.    Medical History:  Past Medical History:   Diagnosis Date    Allergy     seasonal    Crohn disease     Crohn's disease     DDD (degenerative disc disease), cervical     Joint pain     Keloid cicatrix     Ulcer        Surgical History:   Past Surgical History:   Procedure Laterality Date    ABCESS DRAINAGE      APPENDECTOMY      COLON SURGERY  2017    Laparoscopic ileocolic resection    COLONOSCOPY N/A 12/5/2016    Procedure: COLONOSCOPY;  Surgeon: Steven Kerr MD;  Location: [facility] ([address] [place] [address]);  Service: Endoscopy;  Laterality: N/A;    COLONOSCOPY N/A 7/30/2020    Procedure: COLONOSCOPY;  Surgeon: Steven Kerr MD;  [facility]  Service: Endoscopy;  Laterality: N/A;  covid 7/27/20-[facility] [place] [facility]  instructions given to patient in person-BB.7/28 Called pt to come in the morning. Cannot come in early. EC    DIGITAL RECTAL EXAMINATION UNDER ANESTHESIA N/A 3/3/2021    Procedure: EXAM UNDER ANESTHESIA, DIGITAL, RECTUM;  Surgeon: Pete Diaz MD;  [address]  Service: Colon and Rectal;  Laterality: N/A;    ESOPHAGOGASTRODUODENOSCOPY N/A 7/30/2020    Procedure: EGD (ESOPHAGOGASTRODUODENOSCOPY);  Surgeon: Steven Kerr MD;  Location: [facility] ([address] [place] [address]);  Service: Endoscopy;   Laterality: N/A;  covid 7/27/20-[facility] [place]-BB  instructions given to patient in person-BB    EXAMINATION UNDER ANESTHESIA N/A 7/17/2020    Procedure: EXAM UNDER ANESTHESIA;  Surgeon: Pete Diaz MD;  Location: [facility] OR 2ND FLR;  Service: Colon and Rectal;  Laterality: N/A;    INCISION AND DRAINAGE OF ABSCESS N/A 3/3/2021    Procedure: INCISION AND DRAINAGE, ABSCESS;  Surgeon: Pete Diaz MD;  Location: [facility] OR 2ND FLR;  Service: Colon and Rectal;  Laterality: N/A;    INSERTION OF SETON STITCH N/A 3/3/2021    Procedure: PLACEMENT, SETON STITCH;  Surgeon: Pete Diaz MD;  Location: [facility] OR 2ND FLR;  Service: Colon and Rectal;  Laterality: N/A;    LAPAROSCOPIC RESECTION OF SMALL INTESTINE      RECTAL FISTULOTOMY N/A 7/17/2020    Procedure: FISTULOTOMY, RECTUM;  Surgeon: Pete Diaz MD;  Location: [facility] OR 2ND FLR;  Service: Colon and Rectal;  Laterality: N/A;       Family History:   Family History   Problem Relation Age of Onset    Arthritis Father     Cancer Maternal Grandmother     Melanoma Neg Hx     Psoriasis Neg Hx     Lupus Neg Hx     Eczema Neg Hx     Acne Neg Hx     Colon cancer Neg Hx     Rectal cancer Neg Hx     Stomach cancer Neg Hx     Crohn's disease Neg Hx     Esophageal cancer Neg Hx     Irritable bowel syndrome Neg Hx     Ulcerative colitis Neg Hx     Celiac disease Neg Hx        Social History:   Social History     Tobacco Use    Smoking status: Never    Smokeless tobacco: Never   Substance Use Topics    Alcohol use: No    Drug use: No       Allergies: Reviewed.    Medications:  Current Facility-Administered Medications on File Prior to Encounter   Medication Dose Route Frequency Provider Last Rate Last Admin    0.9%  NaCl infusion   Intravenous Continuous Dara Han NP   Stopped at 03/03/21 0842    0.9%  NaCl infusion   Intravenous Continuous Lina Tompkins NP        mupirocin 2 % ointment   Nasal On Call Procedure Dara Han NP        mupirocin 2 % ointment   Nasal  On Call Procedure Lina Tompkins NP   Given at 03/03/21 0743     Current Outpatient Medications on File Prior to Encounter   Medication Sig Dispense Refill    celecoxib (CELEBREX) 200 MG capsule TAKE 1 CAPSULE(200 MG) BY MOUTH TWICE DAILY 60 capsule 1       Physical Exam:  Vital Signs:   Vitals:    02/19/24 1141   BP: (!) 138/100   Pulse: 97   Resp: 15   Temp: 97.7 °F (36.5 °C)     General Appearance: Well appearing in no acute distress  ENT: OP clear  Chest: CTA B  CV: RRR, no m/r/g  Abd: s/nt/nd/nabs  Ext: no edema    Labs:  Reviewed    IMPRESSION:    Crohn's disease    Plan:  I have explained the risks and benefits of colonoscopy to the patient including but not limited to bleeding, perforation, infection, and death. The patient wishes to proceed with colonoscopy.

## 2024-02-19 NOTE — [employer] PATIENT INSTRUCTIONS
Discharge Summary/Instructions after an Endoscopic Procedure  Patient Name: Escobar Toro  Patient MRN: [id number]  Patient YOB: 1980 Monday, February 19, 2024  Terry Stephen MD  Dear patient,  As a result of recent federal legislation (The Federal Cures Act), you may   receive lab or pathology results from your procedure in your [facility]   account before your physician is able to contact you. Your physician or   their representative will relay the results to you with their   recommendations at their soonest availability.  Thank you,  RESTRICTIONS:  During your procedure today, you received medications for sedation.  These   medications may affect your judgment, balance and coordination.  Therefore,   for 24 hours, you have the following restrictions:   - DO NOT drive a car, operate machinery, make legal/financial decisions,   sign important papers or drink alcohol.    ACTIVITY:  Today: no heavy lifting, straining or running due to procedural   sedation/anesthesia.  The following day: return to full activity including work.  DIET:  Eat and drink normally unless instructed otherwise.     TREATMENT FOR COMMON SIDE EFFECTS:  - Mild abdominal pain, nausea, belching, bloating or excessive gas:  rest,   eat lightly and use a heating pad.  - Sore Throat: treat with throat lozenges and/or gargle with warm salt   water.  - Because air was used during the procedure, expelling large amounts of air   from your rectum or belching is normal.  - If a bowel prep was taken, you may not have a bowel movement for 1-3 days.    This is normal.  SYMPTOMS TO WATCH FOR AND REPORT TO YOUR PHYSICIAN:  1. Abdominal pain or bloating, other than gas cramps.  2. Chest pain.  3. Back pain.  4. Signs of infection such as: chills or fever occurring within 24 hours   after the procedure.  5. Rectal bleeding, which would show as bright red, maroon, or black stools.   (A tablespoon of blood from the rectum is not serious,  especially if   hemorrhoids are present.)  6. Vomiting.  7. Weakness or dizziness.  GO DIRECTLY TO THE NEAREST EMERGENCY ROOM IF YOU HAVE ANY OF THE FOLLOWING:      Difficulty breathing              Chills and/or fever over 101 F   Persistent vomiting and/or vomiting blood   Severe abdominal pain   Severe chest pain   Black, tarry stools   Bleeding- more than one tablespoon   Any other symptom or condition that you feel may need urgent attention  Your doctor recommends these additional instructions:  If any biopsies were taken, your doctors clinic will contact you in 1 to 2   weeks with any results.  - Discharge patient to home.   - Resume previous diet today.   - Continue present medications.   - Await pathology results.   - Repeat colonoscopy in 3 years to assess disease activity.   - Return to my office as previously scheduled.   - Crohns disease appears to be in remission.  - Patient has a contact number available for emergencies.  The signs and   symptoms of potential delayed complications were discussed with the   patient.  Return to normal activities tomorrow.  Written discharge   instructions were provided to the patient.  For questions, problems or results please call your physician - Terry Stephen MD at Work:  [phone].  [facility], EMERGENCY ROOM PHONE NUMBER: [phone]  IF A COMPLICATION OR EMERGENCY SITUATION ARISES AND YOU ARE UNABLE TO REACH   YOUR PHYSICIAN - GO DIRECTLY TO THE EMERGENCY ROOM.  Terry Stephen MD  2/19/2024 1:32:59 PM  This report has been verified and signed electronically.  Dear patient,  As a result of recent federal legislation (The Federal Cures Act), you may   receive lab or pathology results from your procedure in your [facility]   account before your physician is able to contact you. Your physician or   their representative will relay the results to you with their   recommendations at their soonest availability.  Thank you,  [employer]

## 2024-02-19 NOTE — ANESTHESIA POSTPROCEDURE EVALUATION
Anesthesia Post Evaluation    Patient: Escobar Toro    Procedure(s) Performed: Procedure(s) (LRB):  COLONOSCOPY (N/A)    Final Anesthesia Type: general      Patient location during evaluation: PACU  Patient participation: Yes- Able to Participate  Level of consciousness: awake and alert and oriented  Post-procedure vital signs: reviewed and stable  Pain management: adequate  Airway patency: patent    PONV status at discharge: No PONV  Anesthetic complications: no      Cardiovascular status: blood pressure returned to baseline and hemodynamically stable  Respiratory status: unassisted  Hydration status: euvolemic  Follow-up not needed.              Vitals Value Taken Time   /77 02/19/24 1406   Temp 36.6 °C (97.8 °F) 02/19/24 1335   Pulse 66 02/19/24 1406   Resp 16 02/19/24 1406   SpO2 100 % 02/19/24 1406         No case tracking events are documented in the log.      Pain/Mark Score: Mark Score: 10 (2/19/2024  1:51 PM)

## 2024-02-19 NOTE — ANESTHESIA PREPROCEDURE EVALUATION
02/19/2024  [facility]  Anesthesia Pre-Operative Evaluation         Patient Name: Escobar Toro  YOB: 1980  MRN: [id number]    SUBJECTIVE:     Pre-operative evaluation for Procedure(s) (LRB):  COLONOSCOPY (N/A)     02/19/2024    Escobar Toro is a 43 y.o. male     Full medical history listed below      LDA: None documented.    Prev airway: None documented.     GTTs: None documented.        Patient Active Problem List   Diagnosis    Crohn's disease of both small and large intestine with fistula    DDD (degenerative disc disease), cervical    Anal fistula    Perianal abscess       Review of patient's allergies indicates:   Allergen Reactions    Remicade [infliximab] Swelling     Throat swelled       Current Inpatient Medications:      Current Facility-Administered Medications on File Prior to Encounter   Medication Dose Route Frequency Provider Last Rate Last Admin    0.9%  NaCl infusion   Intravenous Continuous Dara Han NP   Stopped at 03/03/21 0842    0.9%  NaCl infusion   Intravenous Continuous Lina Tompkins NP        mupirocin 2 % ointment   Nasal On Call Procedure Dara Han NP        mupirocin 2 % ointment   Nasal On Call Procedure Lina Tompkins NP   Given at 03/03/21 0743     Current Outpatient Medications on File Prior to Encounter   Medication Sig Dispense Refill    celecoxib (CELEBREX) 200 MG capsule TAKE 1 CAPSULE(200 MG) BY MOUTH TWICE DAILY 60 capsule 1       Past Surgical History:   Procedure Laterality Date    ABCESS DRAINAGE      APPENDECTOMY      COLON SURGERY  2017    Laparoscopic ileocolic resection    COLONOSCOPY N/A 12/5/2016    Procedure: COLONOSCOPY;  Surgeon: Steven Kerr MD;  Location: [address];  Service: Endoscopy;  Laterality: N/A;    COLONOSCOPY N/A 7/30/2020    Procedure: COLONOSCOPY;   Surgeon: Steven Kerr MD;  Location: [facility] ENDO (4TH FLR);  Service: Endoscopy;  Laterality: N/A;  covid 7/27/20-[facility] [place]-BB  instructions given to patient in person-BB.7/28 Called pt to come in the morning. Cannot come in early. EC    DIGITAL RECTAL EXAMINATION UNDER ANESTHESIA N/A 3/3/2021    Procedure: EXAM UNDER ANESTHESIA, DIGITAL, RECTUM;  Surgeon: Pete Diaz MD;  [facility];  Service: Colon and Rectal;  Laterality: N/A;    ESOPHAGOGASTRODUODENOSCOPY N/A 7/30/2020    Procedure: EGD (ESOPHAGOGASTRODUODENOSCOPY);  Surgeon: Steven Kerr MD;  Location: [facility] [place] (4TH FLR);  Service: Endoscopy;  Laterality: N/A;  covid 7/27/20-[facility] [place] floor-BB  instructions given to patient in person-BB    EXAMINATION UNDER ANESTHESIA N/A 7/17/2020    Procedure: EXAM UNDER ANESTHESIA;  Surgeon: Pete Diaz MD;  [facility] 2[facility];  Service: Colon and Rectal;  Laterality: N/A;    INCISION AND DRAINAGE OF ABSCESS N/A 3/3/2021    Procedure: INCISION AND DRAINAGE, ABSCESS;  Surgeon: Pete Diaz MD;  [facility];  Service: Colon and Rectal;  Laterality: N/A;    INSERTION OF SETON STITCH N/A 3/3/2021    Procedure: PLACEMENT, SETON STITCH;  Surgeon: Pete Diaz MD;  [facility];  Service: Colon and Rectal;  Laterality: N/A;    LAPAROSCOPIC RESECTION OF SMALL INTESTINE      RECTAL FISTULOTOMY N/A 7/17/2020    Procedure: FISTULOTOMY, RECTUM;  Surgeon: Pete Diaz MD;  [facility] 2[facility];  Service: Colon and Rectal;  Laterality: N/A;       Social History     Socioeconomic History    Marital status:     Number of children: 1   Occupational History    Occupation: construction      Employer: [employer]   Tobacco Use    Smoking status: Never    Smokeless tobacco: Never   Substance and Sexual Activity    Alcohol use: No    Drug use: No    Sexual activity: Yes       OBJECTIVE:     Vital Signs Range (Last 24H):  Temp:  [36.5 °C (97.7 °F)]   Pulse:  [97]  "  Resp:  [15]   BP: (138)/(100)   SpO2:  [98 %]       CBC:   No results for input(s): "WBC", "RBC", "HGB", "HCT", "PLT", "MCV", "MCH", "MCHC" in the last 72 hours.    CMP: No results for input(s): "NA", "K", "CL", "CO2", "BUN", "CREATININE", "GLU", "MG", "PHOS", "CALCIUM", "ALBUMIN", "PROT", "ALKPHOS", "ALT", "AST", "BILITOT" in the last 72 hours.    INR:  No results for input(s): "PT", "INR", "PROTIME", "APTT" in the last 72 hours.    Diagnostic Studies: No relevant studies.    EKG:   No results found for this or any previous visit.     2D ECHO:   No results found for this or any previous visit.         ASSESSMENT/PLAN:           Pre-op Assessment    I have reviewed the Patient Summary Reports.    I have reviewed the NPO Status.   I have reviewed the Medications.     Review of Systems  Anesthesia Hx:   History of prior surgery of interest to airway management or planning:          Denies Family Hx of Anesthesia complications.    Denies Personal Hx of Anesthesia complications.                        Physical Exam  General: Well nourished, Cooperative, Alert and Oriented    Airway:  Mallampati: II   Mouth Opening: Normal  TM Distance: Normal  Tongue: Normal  Neck ROM: Normal ROM    Dental:  Intact    Chest/Lungs:  Clear to auscultation, Normal Respiratory Rate    Heart:  Rate: Normal  Rhythm: Regular Rhythm        Anesthesia Plan  Type of Anesthesia, risks & benefits discussed:    Anesthesia Type: Gen Natural Airway  Intra-op Monitoring Plan: Standard ASA Monitors  Induction:  IV  Informed Consent: Informed consent signed with the Patient and all parties understand the risks and agree with anesthesia plan.  All questions answered.   ASA Score: 2  Day of Surgery Review of History & Physical: H&P Update referred to the surgeon/provider.    Ready For Surgery From Anesthesia Perspective.     .      "

## 2024-02-22 LAB
FINAL PATHOLOGIC DIAGNOSIS: NORMAL
GROSS: NORMAL
Lab: NORMAL

## 2024-02-23 ENCOUNTER — TELEPHONE (OUTPATIENT)
Dept: GASTROENTEROLOGY | Facility: CLINIC | Age: 44
End: 2024-02-23
Payer: COMMERCIAL

## 2024-05-07 ENCOUNTER — OFFICE VISIT (OUTPATIENT)
Dept: INTERNAL MEDICINE | Facility: CLINIC | Age: 44
End: 2024-05-07
Payer: COMMERCIAL

## 2024-05-07 ENCOUNTER — LAB VISIT (OUTPATIENT)
Dept: LAB | Facility: HOSPITAL | Age: 44
End: 2024-05-07
Payer: COMMERCIAL

## 2024-05-07 VITALS
DIASTOLIC BLOOD PRESSURE: 90 MMHG | WEIGHT: 182.13 LBS | SYSTOLIC BLOOD PRESSURE: 120 MMHG | BODY MASS INDEX: 24.67 KG/M2 | HEART RATE: 78 BPM | HEIGHT: 72 IN | OXYGEN SATURATION: 97 %

## 2024-05-07 DIAGNOSIS — K50.813 CROHN'S DISEASE OF BOTH SMALL AND LARGE INTESTINE WITH FISTULA: ICD-10-CM

## 2024-05-07 DIAGNOSIS — D84.9 IMMUNOSUPPRESSED STATUS: ICD-10-CM

## 2024-05-07 DIAGNOSIS — R05.3 PERSISTENT COUGH: ICD-10-CM

## 2024-05-07 DIAGNOSIS — R05.3 PERSISTENT COUGH: Primary | ICD-10-CM

## 2024-05-07 LAB
ALBUMIN SERPL BCP-MCNC: 3.9 G/DL (ref 3.5–5.2)
ALP SERPL-CCNC: 90 U/L (ref 55–135)
ALT SERPL W/O P-5'-P-CCNC: 22 U/L (ref 10–44)
ANION GAP SERPL CALC-SCNC: 7 MMOL/L (ref 8–16)
AST SERPL-CCNC: 18 U/L (ref 10–40)
BASOPHILS # BLD AUTO: 0.04 K/UL (ref 0–0.2)
BASOPHILS NFR BLD: 0.9 % (ref 0–1.9)
BILIRUB SERPL-MCNC: 1.3 MG/DL (ref 0.1–1)
BUN SERPL-MCNC: 17 MG/DL (ref 6–20)
CALCIUM SERPL-MCNC: 9.5 MG/DL (ref 8.7–10.5)
CHLORIDE SERPL-SCNC: 107 MMOL/L (ref 95–110)
CO2 SERPL-SCNC: 27 MMOL/L (ref 23–29)
CREAT SERPL-MCNC: 1.2 MG/DL (ref 0.5–1.4)
DIFFERENTIAL METHOD BLD: ABNORMAL
EOSINOPHIL # BLD AUTO: 0.2 K/UL (ref 0–0.5)
EOSINOPHIL NFR BLD: 3.5 % (ref 0–8)
ERYTHROCYTE [DISTWIDTH] IN BLOOD BY AUTOMATED COUNT: 11.9 % (ref 11.5–14.5)
EST. GFR  (NO RACE VARIABLE): >60 ML/MIN/1.73 M^2
GLUCOSE SERPL-MCNC: 87 MG/DL (ref 70–110)
HCT VFR BLD AUTO: 40.9 % (ref 40–54)
HGB BLD-MCNC: 13.8 G/DL (ref 14–18)
IMM GRANULOCYTES # BLD AUTO: 0.01 K/UL (ref 0–0.04)
IMM GRANULOCYTES NFR BLD AUTO: 0.2 % (ref 0–0.5)
LYMPHOCYTES # BLD AUTO: 1.4 K/UL (ref 1–4.8)
LYMPHOCYTES NFR BLD: 29.8 % (ref 18–48)
MCH RBC QN AUTO: 30.7 PG (ref 27–31)
MCHC RBC AUTO-ENTMCNC: 33.7 G/DL (ref 32–36)
MCV RBC AUTO: 91 FL (ref 82–98)
MONOCYTES # BLD AUTO: 0.5 K/UL (ref 0.3–1)
MONOCYTES NFR BLD: 11.8 % (ref 4–15)
NEUTROPHILS # BLD AUTO: 2.5 K/UL (ref 1.8–7.7)
NEUTROPHILS NFR BLD: 53.8 % (ref 38–73)
NRBC BLD-RTO: 0 /100 WBC
PLATELET # BLD AUTO: 230 K/UL (ref 150–450)
PMV BLD AUTO: 10.5 FL (ref 9.2–12.9)
POTASSIUM SERPL-SCNC: 4.2 MMOL/L (ref 3.5–5.1)
PROT SERPL-MCNC: 8 G/DL (ref 6–8.4)
RBC # BLD AUTO: 4.49 M/UL (ref 4.6–6.2)
SODIUM SERPL-SCNC: 141 MMOL/L (ref 136–145)
WBC # BLD AUTO: 4.56 K/UL (ref 3.9–12.7)

## 2024-05-07 PROCEDURE — 1160F RVW MEDS BY RX/DR IN RCRD: CPT | Mod: CPTII,S$GLB,, | Performed by: PHYSICIAN ASSISTANT

## 2024-05-07 PROCEDURE — 3074F SYST BP LT 130 MM HG: CPT | Mod: CPTII,S$GLB,, | Performed by: PHYSICIAN ASSISTANT

## 2024-05-07 PROCEDURE — 36415 COLL VENOUS BLD VENIPUNCTURE: CPT | Performed by: PHYSICIAN ASSISTANT

## 2024-05-07 PROCEDURE — 3008F BODY MASS INDEX DOCD: CPT | Mod: CPTII,S$GLB,, | Performed by: PHYSICIAN ASSISTANT

## 2024-05-07 PROCEDURE — 99214 OFFICE O/P EST MOD 30 MIN: CPT | Mod: S$GLB,,, | Performed by: PHYSICIAN ASSISTANT

## 2024-05-07 PROCEDURE — 80053 COMPREHEN METABOLIC PANEL: CPT | Performed by: PHYSICIAN ASSISTANT

## 2024-05-07 PROCEDURE — 3080F DIAST BP >= 90 MM HG: CPT | Mod: CPTII,S$GLB,, | Performed by: PHYSICIAN ASSISTANT

## 2024-05-07 PROCEDURE — 85025 COMPLETE CBC W/AUTO DIFF WBC: CPT | Performed by: PHYSICIAN ASSISTANT

## 2024-05-07 PROCEDURE — 1159F MED LIST DOCD IN RCRD: CPT | Mod: CPTII,S$GLB,, | Performed by: PHYSICIAN ASSISTANT

## 2024-05-07 PROCEDURE — 99999 PR PBB SHADOW E&M-EST. PATIENT-LVL V: CPT | Mod: PBBFAC,,, | Performed by: PHYSICIAN ASSISTANT

## 2024-05-07 NOTE — PROGRESS NOTES
"Subjective     Patient ID: Escobar Toro is a 44 y.o. male.    Chief Complaint: Cough    HPI    Established pt of No, Primary Doctor (new to me)      Same/day urgent care visit.     Here with concerns of persist cough for over the past month.   He reports in the first week sputum was yellow/green but now mostly clear, symptoms are improving.   He reports his girlfriend was diagnosed with "bacterial infection," ?pneumonia and because he has Crohn's/immunocompromised he wanted to be checked out.     No fevers, cp, sob or night sweats.     He is feeling much better, last took OTC meds about 1 week ago.     Past Medical History:   Diagnosis Date    Allergy     seasonal    Crohn disease     Crohn's disease     DDD (degenerative disc disease), cervical     Joint pain     Keloid cicatrix     Ulcer      Social History     Tobacco Use    Smoking status: Never    Smokeless tobacco: Never   Substance Use Topics    Alcohol use: No    Drug use: No     Review of patient's allergies indicates:   Allergen Reactions    Remicade [infliximab] Swelling     Throat swelled         Review of Systems   Constitutional:  Negative for chills, diaphoresis and fever.   Respiratory:  Positive for cough. Negative for shortness of breath and wheezing.    Cardiovascular:  Negative for chest pain and leg swelling.   Gastrointestinal:  Negative for nausea and vomiting.   Musculoskeletal:  Positive for arthralgias.   Integumentary:  Negative for rash.          Objective  BP (!) 120/90   Pulse 78   Ht 6' (1.829 m)   Wt 82.6 kg (182 lb 1.6 oz)   SpO2 97%   BMI 24.70 kg/m²       Physical Exam  Constitutional:       General: He is not in acute distress.     Appearance: He is not ill-appearing.   HENT:      Nose: Congestion present.   Cardiovascular:      Rate and Rhythm: Normal rate and regular rhythm.   Pulmonary:      Effort: Pulmonary effort is normal. No respiratory distress.      Breath sounds: No wheezing.   Musculoskeletal:      Right " lower leg: No edema.      Left lower leg: No edema.   Neurological:      Mental Status: He is alert.            Assessment and Plan     1. Persistent cough  -     Cancel: X-Ray Chest PA And Lateral; Future; Expected date: 05/07/2024  -     CBC Auto Differential; Future; Expected date: 05/07/2024  -     Comprehensive Metabolic Panel; Future; Expected date: 05/07/2024    2. Crohn's disease of both small and large intestine with fistula  Stable, followed by GI  -     CBC Auto Differential; Future; Expected date: 05/07/2024  -     Comprehensive Metabolic Panel; Future; Expected date: 05/07/2024    3. Immunosuppressed status  Stable, followed by GI, on Hyrimoz, prev Imuran  -     CBC Auto Differential; Future; Expected date: 05/07/2024  -     Comprehensive Metabolic Panel; Future; Expected date: 05/07/2024      Lungs clear on exam  VSS  We discussed lab/xray, pt declines xray at this time as he is feeling much begtter  No concern of bacterial respiratory infection based on my exam  Pt will notify us if symptoms worsening or fail to resolved  Offered Rx cough meds pt declines.   RTC prn    Brenda Mazariegos PA-C

## 2024-07-19 ENCOUNTER — PATIENT MESSAGE (OUTPATIENT)
Dept: GASTROENTEROLOGY | Facility: CLINIC | Age: 44
End: 2024-07-19
Payer: COMMERCIAL

## 2024-07-22 ENCOUNTER — LAB VISIT (OUTPATIENT)
Dept: LAB | Facility: HOSPITAL | Age: 44
End: 2024-07-22
Attending: INTERNAL MEDICINE
Payer: COMMERCIAL

## 2024-07-22 ENCOUNTER — OFFICE VISIT (OUTPATIENT)
Dept: GASTROENTEROLOGY | Facility: CLINIC | Age: 44
End: 2024-07-22
Payer: COMMERCIAL

## 2024-07-22 VITALS
OXYGEN SATURATION: 97 % | TEMPERATURE: 98 F | DIASTOLIC BLOOD PRESSURE: 87 MMHG | BODY MASS INDEX: 25.5 KG/M2 | HEIGHT: 72 IN | SYSTOLIC BLOOD PRESSURE: 131 MMHG | WEIGHT: 188.25 LBS | HEART RATE: 87 BPM

## 2024-07-22 DIAGNOSIS — K50.813 CROHN'S DISEASE OF BOTH SMALL AND LARGE INTESTINE WITH FISTULA: Primary | ICD-10-CM

## 2024-07-22 DIAGNOSIS — D84.821 IMMUNOSUPPRESSION DUE TO DRUG THERAPY: ICD-10-CM

## 2024-07-22 DIAGNOSIS — Z79.899 IMMUNOSUPPRESSION DUE TO DRUG THERAPY: ICD-10-CM

## 2024-07-22 DIAGNOSIS — K50.813 CROHN'S DISEASE OF BOTH SMALL AND LARGE INTESTINE WITH FISTULA: ICD-10-CM

## 2024-07-22 LAB
25(OH)D3+25(OH)D2 SERPL-MCNC: 31 NG/ML (ref 30–96)
ALBUMIN SERPL BCP-MCNC: 3.9 G/DL (ref 3.5–5.2)
ALP SERPL-CCNC: 80 U/L (ref 55–135)
ALT SERPL W/O P-5'-P-CCNC: 15 U/L (ref 10–44)
ANION GAP SERPL CALC-SCNC: 8 MMOL/L (ref 8–16)
AST SERPL-CCNC: 15 U/L (ref 10–40)
BASOPHILS # BLD AUTO: 0.03 K/UL (ref 0–0.2)
BASOPHILS NFR BLD: 0.7 % (ref 0–1.9)
BILIRUB SERPL-MCNC: 1.3 MG/DL (ref 0.1–1)
BUN SERPL-MCNC: 18 MG/DL (ref 6–20)
CALCIUM SERPL-MCNC: 9.4 MG/DL (ref 8.7–10.5)
CHLORIDE SERPL-SCNC: 108 MMOL/L (ref 95–110)
CO2 SERPL-SCNC: 25 MMOL/L (ref 23–29)
CREAT SERPL-MCNC: 1.2 MG/DL (ref 0.5–1.4)
CRP SERPL-MCNC: <0.3 MG/L (ref 0–8.2)
DIFFERENTIAL METHOD BLD: ABNORMAL
EOSINOPHIL # BLD AUTO: 0.2 K/UL (ref 0–0.5)
EOSINOPHIL NFR BLD: 3.6 % (ref 0–8)
ERYTHROCYTE [DISTWIDTH] IN BLOOD BY AUTOMATED COUNT: 12.1 % (ref 11.5–14.5)
EST. GFR  (NO RACE VARIABLE): >60 ML/MIN/1.73 M^2
GLUCOSE SERPL-MCNC: 93 MG/DL (ref 70–110)
HBV CORE AB SERPL QL IA: NORMAL
HBV SURFACE AG SERPL QL IA: NORMAL
HCT VFR BLD AUTO: 42.2 % (ref 40–54)
HGB BLD-MCNC: 13.8 G/DL (ref 14–18)
IMM GRANULOCYTES # BLD AUTO: 0.01 K/UL (ref 0–0.04)
IMM GRANULOCYTES NFR BLD AUTO: 0.2 % (ref 0–0.5)
LYMPHOCYTES # BLD AUTO: 1.3 K/UL (ref 1–4.8)
LYMPHOCYTES NFR BLD: 31.4 % (ref 18–48)
MCH RBC QN AUTO: 29.7 PG (ref 27–31)
MCHC RBC AUTO-ENTMCNC: 32.7 G/DL (ref 32–36)
MCV RBC AUTO: 91 FL (ref 82–98)
MONOCYTES # BLD AUTO: 0.5 K/UL (ref 0.3–1)
MONOCYTES NFR BLD: 11 % (ref 4–15)
NEUTROPHILS # BLD AUTO: 2.2 K/UL (ref 1.8–7.7)
NEUTROPHILS NFR BLD: 53.1 % (ref 38–73)
NRBC BLD-RTO: 0 /100 WBC
PLATELET # BLD AUTO: 223 K/UL (ref 150–450)
PMV BLD AUTO: 10.4 FL (ref 9.2–12.9)
POTASSIUM SERPL-SCNC: 4 MMOL/L (ref 3.5–5.1)
PROT SERPL-MCNC: 7.9 G/DL (ref 6–8.4)
RBC # BLD AUTO: 4.65 M/UL (ref 4.6–6.2)
SODIUM SERPL-SCNC: 141 MMOL/L (ref 136–145)
VIT B12 SERPL-MCNC: 514 PG/ML (ref 210–950)
WBC # BLD AUTO: 4.2 K/UL (ref 3.9–12.7)

## 2024-07-22 PROCEDURE — 86704 HEP B CORE ANTIBODY TOTAL: CPT | Performed by: INTERNAL MEDICINE

## 2024-07-22 PROCEDURE — 1160F RVW MEDS BY RX/DR IN RCRD: CPT | Mod: CPTII,S$GLB,, | Performed by: INTERNAL MEDICINE

## 2024-07-22 PROCEDURE — 80053 COMPREHEN METABOLIC PANEL: CPT | Performed by: INTERNAL MEDICINE

## 2024-07-22 PROCEDURE — 87340 HEPATITIS B SURFACE AG IA: CPT | Performed by: INTERNAL MEDICINE

## 2024-07-22 PROCEDURE — 82607 VITAMIN B-12: CPT | Performed by: INTERNAL MEDICINE

## 2024-07-22 PROCEDURE — 3075F SYST BP GE 130 - 139MM HG: CPT | Mod: CPTII,S$GLB,, | Performed by: INTERNAL MEDICINE

## 2024-07-22 PROCEDURE — 1159F MED LIST DOCD IN RCRD: CPT | Mod: CPTII,S$GLB,, | Performed by: INTERNAL MEDICINE

## 2024-07-22 PROCEDURE — G2211 COMPLEX E/M VISIT ADD ON: HCPCS | Mod: S$GLB,,, | Performed by: INTERNAL MEDICINE

## 2024-07-22 PROCEDURE — 86706 HEP B SURFACE ANTIBODY: CPT | Performed by: INTERNAL MEDICINE

## 2024-07-22 PROCEDURE — 85025 COMPLETE CBC W/AUTO DIFF WBC: CPT | Performed by: INTERNAL MEDICINE

## 2024-07-22 PROCEDURE — 3079F DIAST BP 80-89 MM HG: CPT | Mod: CPTII,S$GLB,, | Performed by: INTERNAL MEDICINE

## 2024-07-22 PROCEDURE — 99214 OFFICE O/P EST MOD 30 MIN: CPT | Mod: S$GLB,,, | Performed by: INTERNAL MEDICINE

## 2024-07-22 PROCEDURE — 86140 C-REACTIVE PROTEIN: CPT | Performed by: INTERNAL MEDICINE

## 2024-07-22 PROCEDURE — 82306 VITAMIN D 25 HYDROXY: CPT | Performed by: INTERNAL MEDICINE

## 2024-07-22 PROCEDURE — 86480 TB TEST CELL IMMUN MEASURE: CPT | Performed by: INTERNAL MEDICINE

## 2024-07-22 PROCEDURE — 3008F BODY MASS INDEX DOCD: CPT | Mod: CPTII,S$GLB,, | Performed by: INTERNAL MEDICINE

## 2024-07-22 NOTE — PROGRESS NOTES
[facility]          Inflammatory Bowel Disease Follow Up Note         TODAY'S VISIT DATE:  7/22/2024    Reason for Consult:    Chief Complaint   Patient presents with    Crohn's Disease       PCP: No, Primary Doctor      Referring MD:   No ref. provider found    History of Present Illness:  Escobar Toro who is a 44 y.o. male is being seen today at the [facility] on 07/22/2024 for inflammatory bowel disease- Crohn's disease.  He underwent a follow-up colonoscopy February 19, 2024.  He had no evidence of active disease.  He did have some scarring in the perianal area from prior perianal disease.  Based on these results we decided to stop azathioprine.  He reports that he continues to do well.  He has some mild constipation.  He denies any blood in his stools or significant abdominal pain.  Currently he is having some issues with his sciatic nerve but this seems to be improving.  He took some Celebrex for this and that helped.  He denies any issues with the use of Hyrimoz.    IBD History:  He is here for management of Crohn's disease.  He was diagnosed with Crohn's disease around 1998. He reported symptoms for about 6 years prior to the diagnosis.  He was initially managed by Dr. Delcid at [facility].  He was initially treated with steroids as well as multiple aminosalicylates without any improvement.  Subsequently, Remicade was started and he took this from 9690-6281 but this was stopped in 2009 because of development of antibodies and infusion reactions.  He was then started on Humira.  He began seeing [facility] around 2013. In June of 2014 he was admitted to the hospital with symptoms of active Crohn's disease and imaging at that time showed a stricture in the terminal ileum.  He was managed with steroids and antibiotics and Humira was maintained after that.  It was noted that he had had some issues with pustular lesions of  the skin when taking Humira and was seeing Dermatology for this issue.  He was found to have MRSA and underwent treatment for folliculitis.  In September 2016 he presented again with nausea, vomiting, and diarrhea.  A repeat CT scan is again revealed narrowing of the terminal ileum with chronic changes from prior inflammatory episodes but no findings suggestive of active inflammation.  He was managed conservatively at that time and underwent colonoscopy on December 5, 2016. At that time there was no evidence of active inflammation but a severe stricture of the terminal ileum was identified.  He was referred to Dr. Diaz with Colorectal surgery and underwent ileocolonic resection on January 4, 2017. He was supposed to restart Humira after surgery but because of insurance reasons it was not started.  At that time he had developed a recurrent perianal abscess that resolved.  Because of his social situation he did not he did not follow up after 2018. He presented again in July of 2020 with complaints of swelling and purulent drainage in the perianal area.  He had been off of medication for quite some time.  He saw Dr. Diaz because of these issues and was scheduled for exam under anesthesia in July 2020. At that time of fistulotomy was performed.  A colonoscopy done July 30, 2020 did not reveal any active inflammation and revealed a patent anastomosis.  An adalimumab level and antibodies was performed August (when he had been off of therapy for over 3 years) and there were no antibodies present.  The plan was to restart Humira but this was never done.  He presented again March 1, 2021 with perianal pain and purulence/brownish discharge from the anal canal and was again found to have a perianal abscess.  He underwent exam under anesthesia with seton placement on March 4th.  He had a CT scan prior to surgery that did not reveal any evidence of significant inflammation within the bowel.  He started azathioprine and Humira on  April 7, 2021. In September 2021 we increased the dose of Humira to once weekly.  In the summer of 2023 a follow-up stool calprotectin level was undetectable. He underwent a follow-up colonoscopy February 19, 2024.  He had no evidence of active disease.  He did have some scarring in the perianal area from prior perianal disease.  Based on these results we decided to stop azathioprine.     IBD Details:  Dx Date:  1998-symptoms since 1992  Disease type/distribution:  Crohn's disease/ileal involvement and perianal disease  Current Treatment:  Hyrimoz 40 mg weekly  Start Date:  04/07/2021 Response:  Endoscopic and histologic remission  Optimized:  Yes   Adverse reactions:  None  Prior surgeries:  Multiple incisions and drainage of abscesses, fistulotomy; January 2017-ileocecal resection for stricturing disease  CRP Elevation: Y  Disease Complications:  Stricturing disease, perianal fistulizing disease with abscess  Extraintestinal manifestations:  None  Prior treatments:   Steroids:  Good response in the past  5ASA:  No response  IMM:   Unclear response to azathioprine-fatigue felt to be related to azathioprine-improved with time   Flu like symptoms with methotrexate  TNF Inh:   Remicade-good response, developed infusion reactions and was found to have high levels of antibodies    Adalimumab-good response, stopped prior to surgery in 2017, restarted in April 2021   Anti-Integrin:  None   IL 12/23:  None  PAOLO Inh:  None    Previous Clinical Trials:  None    Last Colonoscopy:   February 19, 2024-healthy ileocolonic anastomosis, no signs of ileal inflammation, normal colon, scarring in the perianal area and in the anal canal  July 2020 patent ileocolonic anastomosis, no active inflammation    Other Endoscopies:  Previous upper endoscopy with no significant findings    Imaging:   MRE:  Previous studies reviewed   CT:  Previous studies reviewed   Other:  Not applicable    Pertinent Labs:  Lab Results   Component Value Date  "   SEDRATE 36 (H) 12/19/2016    CRP 0.4 01/11/2024     No results found for: "TTGIGA", "IGA"  Lab Results   Component Value Date    TSH 3.434 01/11/2024     Lab Results   Component Value Date    ZYBYYEEE12TW 32 01/11/2024    SZXKJYQG43 572 04/19/2022     Lab Results   Component Value Date    HEPBSAG Non-reactive 06/26/2023    HEPBCAB Non-reactive 06/26/2023    HEPCAB Negative 03/24/2021     Lab Results   Component Value Date    THC62CNSI Negative 03/24/2021     Lab Results   Component Value Date    NIL 0.68998 06/26/2023    TBAG 0.067 07/05/2017    TBAGNIL 0.037 07/05/2017    MITOGENNIL 10.000 06/26/2023    TBGOLD Negative 07/05/2017     Lab Results   Component Value Date    TPTMINTERP SEE BELOW 03/24/2021     Lab Results   Component Value Date    STOOLCULTURE  09/07/2016     No Salmonella,Shigella,Vibrio,Campylobacter,Yersinia isolated.    ZKSZXQUXGN7T Negative 09/07/2016    FSGLRCZCKB9O Negative 09/07/2016    CDIFFICILEAN Negative 09/07/2016    CDIFFTOX Negative 09/07/2016     Lab Results   Component Value Date    CALPROTECTIN <27.1 01/10/2024       Therapeutic Drug Monitoring Labs:  No results found for: "PROMETH"  No results found for: "ANSADAINIT", "INFLIXIMAB", "INFLIXINTERP"    Vaccinations:  Lab Results   Component Value Date    HEPBSAB Negative 03/24/2021     Lab Results   Component Value Date    HEPAIGG Negative 03/24/2021     Lab Results   Component Value Date    VARICELLAZOS 1.75 (H) 03/24/2021    VARICELLAINT Positive (A) 03/24/2021     Immunization History   Administered Date(s) Administered    Hepatitis A / Hepatitis B 06/18/2015    Hepatitis A, Adult 09/19/2014    Hepatitis B (recombinant) Adjuvanted, 2 dose 04/25/2022    Influenza 03/25/2013    Influenza - Quadrivalent 12/03/2015    Influenza - Trivalent (ADULT) 03/25/2013    Influenza Split 03/25/2013    Pneumococcal Conjugate - 13 Valent 09/05/2014    Tdap 06/18/2015    Zoster Recombinant 04/25/2022         Review of Systems  Review of Systems "   Constitutional:  Negative for chills, fever and weight loss.   HENT:  Negative for sore throat.    Eyes:  Negative for pain, discharge and redness.   Respiratory:  Negative for cough, shortness of breath and wheezing.    Cardiovascular:  Negative for chest pain and leg swelling.   Gastrointestinal:  Negative for abdominal pain, blood in stool, constipation, diarrhea, heartburn, melena, nausea and vomiting.   Genitourinary:  Negative for dysuria and frequency.   Musculoskeletal:  Negative for back pain, joint pain and myalgias.   Skin:  Negative for rash.   Neurological:  Negative for focal weakness and seizures.   Endo/Heme/Allergies:  Does not bruise/bleed easily.   Psychiatric/Behavioral:  Negative for depression. The patient is not nervous/anxious.              Medical History:   Past Medical History:   Diagnosis Date    Allergy     seasonal    Crohn disease     Crohn's disease     DDD (degenerative disc disease), cervical     Joint pain     Keloid cicatrix     Ulcer        Surgical History:  Past Surgical History:   Procedure Laterality Date    ABCESS DRAINAGE      APPENDECTOMY      COLON SURGERY  2017    Laparoscopic ileocolic resection    COLONOSCOPY N/A 12/5/2016    Procedure: COLONOSCOPY;  Surgeon: Steven Kerr MD;  Location: [address]);  Service: Endoscopy;  Laterality: N/A;    COLONOSCOPY N/A 7/30/2020    Procedure: COLONOSCOPY;  Surgeon: Steven Kerr MD;  Location: [address]);  Service: Endoscopy;  Laterality: N/A;  covid 7/27/20-[facility] [place]-BB  instructions given to patient in person-BB.7/28 Called pt to come in the morning. Cannot come in early. EC    COLONOSCOPY N/A 2/19/2024    Procedure: COLONOSCOPY;  Surgeon: Terry Stephen MD;  Location: [address]);  Service: Endoscopy;  Laterality: N/A;  Ref By:,instr sent via portal,[facility].  2/7-[facility] for precall-MS    DIGITAL RECTAL EXAMINATION UNDER ANESTHESIA N/A 3/3/2021    Procedure: EXAM UNDER  ANESTHESIA, DIGITAL, RECTUM;  Surgeon: Pete Diaz MD;  Location: [facility] OR 2ND FLR;  Service: Colon and Rectal;  Laterality: N/A;    ESOPHAGOGASTRODUODENOSCOPY N/A 7/30/2020    Procedure: EGD (ESOPHAGOGASTRODUODENOSCOPY);  Surgeon: Steven Kerr MD;  Location: [facility] ENDO (4TH FLR);  Service: Endoscopy;  Laterality: N/A;  [place] [place]  instructions given to patient in person-BB    EXAMINATION UNDER ANESTHESIA N/A 7/17/2020    Procedure: EXAM UNDER ANESTHESIA;  Surgeon: Pete Diaz MD;  [facility]: [facility] OR 2ND FLR;  Service: Colon and Rectal;  Laterality: N/A;    INCISION AND DRAINAGE OF ABSCESS N/A 3/3/2021    Procedure: INCISION AND DRAINAGE, ABSCESS;  Surgeon: Pete Diaz MD;  Location: [facility] OR 2ND FLR;  Service: Colon and Rectal;  Laterality: N/A;    INSERTION OF SETON STITCH N/A 3/3/2021    Procedure: PLACEMENT, SETON STITCH;  Surgeon: Pete Diaz MD;  Location: [facility] OR 2ND FLR;  Service: Colon and Rectal;  Laterality: N/A;    LAPAROSCOPIC RESECTION OF SMALL INTESTINE      RECTAL FISTULOTOMY N/A 7/17/2020    Procedure: FISTULOTOMY, RECTUM;  Surgeon: Pete Diaz MD;  Location: [facility] OR 2ND FLR;  Service: Colon and Rectal;  Laterality: N/A;       Family History:   Family History   Problem Relation Name Age of Onset    Arthritis Father      Cancer Maternal Grandmother      Melanoma Neg Hx      Psoriasis Neg Hx      Lupus Neg Hx      Eczema Neg Hx      Acne Neg Hx      Colon cancer Neg Hx      Rectal cancer Neg Hx      Stomach cancer Neg Hx      Crohn's disease Neg Hx      Esophageal cancer Neg Hx      Irritable bowel syndrome Neg Hx      Ulcerative colitis Neg Hx      Celiac disease Neg Hx         Social History:   Social History     Tobacco Use    Smoking status: Never    Smokeless tobacco: Never   Substance Use Topics    Alcohol use: No    Drug use: No       Allergies: Reviewed    Home Medications:   Medication List with Changes/Refills   Current Medications    ADALIMUMAB-ADAZ  (HYRIMOZ,CF, PEN) 40 MG/0.4 ML PNIJ    Inject 1 Pen into the skin every 7 days.    AZATHIOPRINE (IMURAN) 50 MG TAB    TAKE 2 TABLETS(100 MG) BY MOUTH EVERY DAY    CELECOXIB (CELEBREX) 200 MG CAPSULE    TAKE 1 CAPSULE(200 MG) BY MOUTH TWICE DAILY       Physical Exam:  Vital Signs:  /87 (BP Location: Left arm, Patient Position: Sitting, BP Method: Medium (Automatic))   Pulse 87   Temp 98.2 °F (36.8 °C) (Temporal)   Ht 6' (1.829 m)   Wt 85.4 kg (188 lb 4.4 oz)   SpO2 97%   BMI 25.53 kg/m²   Body mass index is 25.53 kg/m².    Physical Exam  Constitutional:       General: He is not in acute distress.     Appearance: Normal appearance. He is well-developed. He is not ill-appearing or toxic-appearing.   Cardiovascular:      Rate and Rhythm: Normal rate and regular rhythm.      Heart sounds: Normal heart sounds.   Pulmonary:      Effort: Pulmonary effort is normal.      Breath sounds: Normal breath sounds.   Abdominal:      General: Bowel sounds are normal. There is no distension.      Palpations: Abdomen is soft.      Tenderness: There is no abdominal tenderness.   Musculoskeletal:      Right lower leg: No edema.      Left lower leg: No edema.   Skin:     General: Skin is warm and dry.      Coloration: Skin is not pale.      Findings: No erythema or rash.   Neurological:      General: No focal deficit present.      Mental Status: He is alert and oriented to person, place, and time.   Psychiatric:         Mood and Affect: Mood normal.         Behavior: Behavior normal.         Thought Content: Thought content normal.         Judgment: Judgment normal.         Labs: reviewed and pertinent noted above    Assessment/Plan:  Escobar Toro is a 44 y.o. male with ileal and perianal Crohn's disease with perianal fistulizing disease with recent abscess. The following issues were addresssed:    1. Crohn's disease of both small and large intestine with fistula    2. Immunosuppression due to drug therapy      1.  Crohn's disease:  He continues to do extremely well.  Continue current medical therapy.  Update labs today.  Follow up in 6 months.    2. Perianal abscess:  Setons removed.  Continue to monitor.  No active issues.    3. Constipation:  Consider use of stool softeners.  He has significant scarring in the anal canal and perianal area that are likely contributing.    4. Immunosuppression:  Recommend pneumonia vaccine and starting shingles vaccine series over again.  He would like to get this done but would like to wait since he has a trip coming up later this week.  He will notify us when he wants to schedule these.      # IBD specific health maintenance:  Colon cancer surveillance:  Up-to-date-minimal colonic involvement    Annual:  - Eye exam:  Discuss in the future  - Skin exam (if on IMM/TNF):  Recommend annual exam-referral placed today  - reminded pt to use sunblock/hats/sunprotective clothing  - PAP (if immunosuppressed):  Not applicable    DEXA:  Not applicable    Vitamin D:  Recent level was normal    Vaccines:    Influenza:  Recommend annual vaccination   Pneumovax:  Recommend vaccination   HAV:  Needs vaccination   HBV:  Needs vaccination   Tdap:  Up-to-date-done in 2015   MMR:  Up-to-date   VZV:  Immune   HZV:  Recommend vaccination   HPV:  Not applicable   Meningococcus:  Not applicable   COVID:  Recommend vaccination    Follow up: Follow up in about 6 months (around 1/22/2025).    Visit today is associated with current or anticipated ongoing medical care related to this patient's single serious condition/complex condition (Crohn's disease).      Thank you again for sending Escobar Hooperterson to see Dr. Gutierrez Stephen today at the [facility]. Please don't hesitate to contact Dr. Stephen if there are any questions regarding this evaluation, or if you have any other patients with inflammatory bowel disease for whom you would like a consultation. You can reach Dr. Stephen at  [phone] or by email at [email or web address]    Terry Stephen MD  Department of Gastroenterology  Inflammatory Bowel Disease

## 2024-07-22 NOTE — PROGRESS NOTES
IBD PATIENT INTAKE:    Confirm current PCP: No, Primary Doctor  If no PCP-  number given to establish [phone]: Yes    IBD THERAPY (name, dose/frequency):  Adalimumab (Humira, Hyrimoz, etc.) 40mg q 7 days  Last dose:  07/18/24    Next dose:  07/25/24    Antibiotics (past 30 Days):  No  If yes   Indication:  Name of antibiotic:  Completion date:

## 2024-07-22 NOTE — PATIENT INSTRUCTIONS
Continue Hyrimoz  Labs today  Let us know when you want to get shingles and pneumonia vaccines  Follow up in 6 months

## 2024-07-23 LAB
GAMMA INTERFERON BACKGROUND BLD IA-ACNC: 0.01 IU/ML
M TB IFN-G CD4+ BCKGRND COR BLD-ACNC: 0.02 IU/ML
M TB IFN-G CD4+ BCKGRND COR BLD-ACNC: 0.03 IU/ML
MITOGEN IGNF BCKGRD COR BLD-ACNC: 9.99 IU/ML
TB GOLD PLUS: NEGATIVE

## 2024-07-24 LAB
HBV SURFACE AB SER QL IA: NEGATIVE
HBV SURFACE AB SERPL IA-ACNC: <3 MIU/ML

## 2024-08-07 ENCOUNTER — OFFICE VISIT (OUTPATIENT)
Dept: OPTOMETRY | Facility: CLINIC | Age: 44
End: 2024-08-07
Payer: COMMERCIAL

## 2024-08-07 DIAGNOSIS — H52.4 PRESBYOPIA: ICD-10-CM

## 2024-08-07 DIAGNOSIS — H52.03 HYPEROPIA, BILATERAL: ICD-10-CM

## 2024-08-07 DIAGNOSIS — D31.31 CHOROIDAL NEVUS OF RIGHT EYE: Primary | ICD-10-CM

## 2024-08-07 PROCEDURE — 92015 DETERMINE REFRACTIVE STATE: CPT | Mod: S$GLB,,, | Performed by: OPTOMETRIST

## 2024-08-07 PROCEDURE — 99999 PR PBB SHADOW E&M-EST. PATIENT-LVL II: CPT | Mod: PBBFAC,,, | Performed by: OPTOMETRIST

## 2024-08-07 PROCEDURE — 1159F MED LIST DOCD IN RCRD: CPT | Mod: CPTII,S$GLB,, | Performed by: OPTOMETRIST

## 2024-08-07 PROCEDURE — 92004 COMPRE OPH EXAM NEW PT 1/>: CPT | Mod: S$GLB,,, | Performed by: OPTOMETRIST

## 2024-10-23 ENCOUNTER — TELEPHONE (OUTPATIENT)
Dept: GASTROENTEROLOGY | Facility: CLINIC | Age: 44
End: 2024-10-23
Payer: COMMERCIAL

## 2024-10-23 NOTE — TELEPHONE ENCOUNTER
----- Message from GENE Velez sent at 10/23/2024  9:08 AM CDT -----    ----- Message -----  From: Petra Arnold  Sent: 10/22/2024   7:40 PM CDT  To: [facility]af[facility] SANTOS Staff    Type:  Pharmacy Calling to Clarify an RX    Name of Caller:Nilda   Pharmacy Name:[facility]  Prescription Name:adalimumab-adaz (HYRIMOZ,CF, PEN) 40 mg/0.4 mL PnIj  What do they need to clarify?:prior authorization   Best Call Back Number:[phone]  Additional Information: call back

## 2024-10-23 NOTE — TELEPHONE ENCOUNTER
Spoke with rep Tamayo at [facility]- Hyrimoz PA renewal needed    Completed PA form and faxed back to [facility] PA dept ([phone]) along with clinical documentation

## 2025-01-13 ENCOUNTER — OFFICE VISIT (OUTPATIENT)
Dept: GASTROENTEROLOGY | Facility: CLINIC | Age: 45
End: 2025-01-13
Payer: COMMERCIAL

## 2025-01-13 ENCOUNTER — LAB VISIT (OUTPATIENT)
Dept: LAB | Facility: HOSPITAL | Age: 45
End: 2025-01-13
Attending: INTERNAL MEDICINE
Payer: COMMERCIAL

## 2025-01-13 VITALS
WEIGHT: 174.38 LBS | HEIGHT: 72 IN | TEMPERATURE: 98 F | DIASTOLIC BLOOD PRESSURE: 85 MMHG | OXYGEN SATURATION: 100 % | HEART RATE: 78 BPM | SYSTOLIC BLOOD PRESSURE: 128 MMHG | BODY MASS INDEX: 23.62 KG/M2

## 2025-01-13 DIAGNOSIS — K50.813 CROHN'S DISEASE OF BOTH SMALL AND LARGE INTESTINE WITH FISTULA: ICD-10-CM

## 2025-01-13 DIAGNOSIS — F32.A DEPRESSION, UNSPECIFIED DEPRESSION TYPE: ICD-10-CM

## 2025-01-13 DIAGNOSIS — D84.821 IMMUNOSUPPRESSION DUE TO DRUG THERAPY: Primary | ICD-10-CM

## 2025-01-13 DIAGNOSIS — Z79.899 IMMUNOSUPPRESSION DUE TO DRUG THERAPY: Primary | ICD-10-CM

## 2025-01-13 PROBLEM — K61.0 PERIANAL ABSCESS: Status: RESOLVED | Noted: 2021-03-03 | Resolved: 2025-01-13

## 2025-01-13 LAB
ALBUMIN SERPL BCP-MCNC: 4.2 G/DL (ref 3.5–5.2)
ALP SERPL-CCNC: 75 U/L (ref 40–150)
ALT SERPL W/O P-5'-P-CCNC: 16 U/L (ref 10–44)
ANION GAP SERPL CALC-SCNC: 7 MMOL/L (ref 8–16)
AST SERPL-CCNC: 14 U/L (ref 10–40)
BASOPHILS # BLD AUTO: 0.05 K/UL (ref 0–0.2)
BASOPHILS NFR BLD: 1.1 % (ref 0–1.9)
BILIRUB SERPL-MCNC: 1.4 MG/DL (ref 0.1–1)
BUN SERPL-MCNC: 18 MG/DL (ref 6–20)
CALCIUM SERPL-MCNC: 9.4 MG/DL (ref 8.7–10.5)
CHLORIDE SERPL-SCNC: 107 MMOL/L (ref 95–110)
CO2 SERPL-SCNC: 25 MMOL/L (ref 23–29)
CREAT SERPL-MCNC: 1 MG/DL (ref 0.5–1.4)
CRP SERPL-MCNC: 0.6 MG/L (ref 0–8.2)
DIFFERENTIAL METHOD BLD: ABNORMAL
EOSINOPHIL # BLD AUTO: 0.1 K/UL (ref 0–0.5)
EOSINOPHIL NFR BLD: 2.2 % (ref 0–8)
ERYTHROCYTE [DISTWIDTH] IN BLOOD BY AUTOMATED COUNT: 12.2 % (ref 11.5–14.5)
EST. GFR  (NO RACE VARIABLE): >60 ML/MIN/1.73 M^2
GLUCOSE SERPL-MCNC: 82 MG/DL (ref 70–110)
HCT VFR BLD AUTO: 42.9 % (ref 40–54)
HGB BLD-MCNC: 13.7 G/DL (ref 14–18)
IMM GRANULOCYTES # BLD AUTO: 0.01 K/UL (ref 0–0.04)
IMM GRANULOCYTES NFR BLD AUTO: 0.2 % (ref 0–0.5)
LYMPHOCYTES # BLD AUTO: 1.2 K/UL (ref 1–4.8)
LYMPHOCYTES NFR BLD: 25.4 % (ref 18–48)
MCH RBC QN AUTO: 29.5 PG (ref 27–31)
MCHC RBC AUTO-ENTMCNC: 31.9 G/DL (ref 32–36)
MCV RBC AUTO: 93 FL (ref 82–98)
MONOCYTES # BLD AUTO: 0.6 K/UL (ref 0.3–1)
MONOCYTES NFR BLD: 11.9 % (ref 4–15)
NEUTROPHILS # BLD AUTO: 2.8 K/UL (ref 1.8–7.7)
NEUTROPHILS NFR BLD: 59.2 % (ref 38–73)
NRBC BLD-RTO: 0 /100 WBC
PLATELET # BLD AUTO: 235 K/UL (ref 150–450)
PMV BLD AUTO: 10.1 FL (ref 9.2–12.9)
POTASSIUM SERPL-SCNC: 4.2 MMOL/L (ref 3.5–5.1)
PROT SERPL-MCNC: 8.3 G/DL (ref 6–8.4)
RBC # BLD AUTO: 4.64 M/UL (ref 4.6–6.2)
SODIUM SERPL-SCNC: 139 MMOL/L (ref 136–145)
TSH SERPL DL<=0.005 MIU/L-ACNC: 2.42 UIU/ML (ref 0.4–4)
WBC # BLD AUTO: 4.64 K/UL (ref 3.9–12.7)

## 2025-01-13 PROCEDURE — G2211 COMPLEX E/M VISIT ADD ON: HCPCS | Mod: S$GLB,,, | Performed by: INTERNAL MEDICINE

## 2025-01-13 PROCEDURE — 99214 OFFICE O/P EST MOD 30 MIN: CPT | Mod: S$GLB,,, | Performed by: INTERNAL MEDICINE

## 2025-01-13 PROCEDURE — 84443 ASSAY THYROID STIM HORMONE: CPT | Performed by: INTERNAL MEDICINE

## 2025-01-13 PROCEDURE — 3008F BODY MASS INDEX DOCD: CPT | Mod: CPTII,S$GLB,, | Performed by: INTERNAL MEDICINE

## 2025-01-13 PROCEDURE — 85025 COMPLETE CBC W/AUTO DIFF WBC: CPT | Performed by: INTERNAL MEDICINE

## 2025-01-13 PROCEDURE — 86140 C-REACTIVE PROTEIN: CPT | Performed by: INTERNAL MEDICINE

## 2025-01-13 PROCEDURE — 80053 COMPREHEN METABOLIC PANEL: CPT | Performed by: INTERNAL MEDICINE

## 2025-01-13 PROCEDURE — 36415 COLL VENOUS BLD VENIPUNCTURE: CPT | Performed by: INTERNAL MEDICINE

## 2025-01-13 PROCEDURE — 1159F MED LIST DOCD IN RCRD: CPT | Mod: CPTII,S$GLB,, | Performed by: INTERNAL MEDICINE

## 2025-01-13 PROCEDURE — 3074F SYST BP LT 130 MM HG: CPT | Mod: CPTII,S$GLB,, | Performed by: INTERNAL MEDICINE

## 2025-01-13 PROCEDURE — 1160F RVW MEDS BY RX/DR IN RCRD: CPT | Mod: CPTII,S$GLB,, | Performed by: INTERNAL MEDICINE

## 2025-01-13 PROCEDURE — 3079F DIAST BP 80-89 MM HG: CPT | Mod: CPTII,S$GLB,, | Performed by: INTERNAL MEDICINE

## 2025-01-13 PROCEDURE — 83993 ASSAY FOR CALPROTECTIN FECAL: CPT | Performed by: INTERNAL MEDICINE

## 2025-01-13 RX ORDER — ESCITALOPRAM OXALATE 10 MG/1
10 TABLET ORAL DAILY
Qty: 30 TABLET | Refills: 3 | Status: SHIPPED | OUTPATIENT
Start: 2025-01-13 | End: 2026-01-13

## 2025-01-13 NOTE — PROGRESS NOTES
[facility]          Inflammatory Bowel Disease Follow Up Note         TODAY'S VISIT DATE:  1/13/2025    Reason for Consult:    Chief Complaint   Patient presents with    Crohn's Disease       PCP: No, Primary Doctor      Referring MD:   No ref. provider found    History of Present Illness:  Escobar Toro who is a 44 y.o. male is being seen today at the [facility] on 01/13/2025 for inflammatory bowel disease- Crohn's disease.  He reports today that he has been doing okay with regards to his Crohn's disease but over the last few months he has noticed some changes in his bowel movements.  He is having more irregular bowel movements.  He will have some constipation issues in the morning with hard stools and then later in the day he will have looser stools.  He continues to take adalimumab weekly.  In addition to the changes in his bowel habits he also has been having issues with a depressed mood.  He is not sure why he would be depressed because he recently gotten  and has been extremely happy about that and he also recently received an offer for a promotion at work.  In spite of these positive things happening in his life he has been having a more depressed mood, having problems getting the sleep and waking up much earlier in the morning, worrying a lot, lack of interest in things, decreased appetite, and feeling worthless.  This has affected his work.  He has problems concentrating.  He denies any suicide ill ideations or homicidal ideations.  He has a history of depression in the past but there is always associated with life issue such as medical illness or divorce.  He was on Wellbutrin in the past and he felt like that cause problems with memory.  He has seen a therapist in the past as well.    IBD History:  He is here for management of Crohn's disease.  He was diagnosed with Crohn's disease around 1998. He reported symptoms for  about 6 years prior to the diagnosis.  He was initially managed by Dr. Delcid at [facility].  He was initially treated with steroids as well as multiple aminosalicylates without any improvement.  Subsequently, Remicade was started and he took this from 1998-5888 but this was stopped in 2009 because of development of antibodies and infusion reactions.  He was then started on Humira.  He began seeing [facility] around 2013. In June of 2014 he was admitted to the hospital with symptoms of active Crohn's disease and imaging at that time showed a stricture in the terminal ileum.  He was managed with steroids and antibiotics and Humira was maintained after that.  It was noted that he had had some issues with pustular lesions of the skin when taking Humira and was seeing Dermatology for this issue.  He was found to have MRSA and underwent treatment for folliculitis.  In September 2016 he presented again with nausea, vomiting, and diarrhea.  A repeat CT scan is again revealed narrowing of the terminal ileum with chronic changes from prior inflammatory episodes but no findings suggestive of active inflammation.  He was managed conservatively at that time and underwent colonoscopy on December 5, 2016. At that time there was no evidence of active inflammation but a severe stricture of the terminal ileum was identified.  He was referred to Dr. Diaz with Colorectal surgery and underwent ileocolonic resection on January 4, 2017. He was supposed to restart Humira after surgery but because of insurance reasons it was not started.  At that time he had developed a recurrent perianal abscess that resolved.  Because of his social situation he did not he did not follow up after 2018. He presented again in July of 2020 with complaints of swelling and purulent drainage in the perianal area.  He had been off of medication for quite some time.  He saw Dr. Diaz because of these issues and was scheduled for exam under anesthesia  in July 2020. At that time of fistulotomy was performed.  A colonoscopy done July 30, 2020 did not reveal any active inflammation and revealed a patent anastomosis.  An adalimumab level and antibodies was performed August (when he had been off of therapy for over 3 years) and there were no antibodies present.  The plan was to restart Humira but this was never done.  He presented again March 1, 2021 with perianal pain and purulence/brownish discharge from the anal canal and was again found to have a perianal abscess.  He underwent exam under anesthesia with seton placement on March 4th.  He had a CT scan prior to surgery that did not reveal any evidence of significant inflammation within the bowel.  He started azathioprine and Humira on April 7, 2021. In September 2021 we increased the dose of Humira to once weekly.  In the summer of 2023 a follow-up stool calprotectin level was undetectable. He underwent a follow-up colonoscopy February 19, 2024.  He had no evidence of active disease.  He did have some scarring in the perianal area from prior perianal disease.  Based on these results we decided to stop azathioprine.     IBD Details:  Dx Date:  1998-symptoms since 1992  Disease type/distribution:  Crohn's disease/ileal involvement and perianal disease  Current Treatment:  Hyrimoz 40 mg weekly  Start Date:  04/07/2021 Response:  Endoscopic and histologic remission  Optimized:  Yes   Adverse reactions:  None  Prior surgeries:  Multiple incisions and drainage of abscesses, fistulotomy; January 2017-ileocecal resection for stricturing disease  CRP Elevation: Y  Disease Complications:  Stricturing disease, perianal fistulizing disease with abscess  Extraintestinal manifestations:  None  Prior treatments:   Steroids:  Good response in the past  5ASA:  No response  IMM:   Unclear response to azathioprine-fatigue felt to be related to azathioprine-improved with time   Flu like symptoms with methotrexate  TNF Inh:   " Remicade-good response, developed infusion reactions and was found to have high levels of antibodies    Adalimumab-good response, stopped prior to surgery in 2017, restarted in April 2021   Anti-Integrin:  None   IL 12/23:  None  PAOLO Inh:  None    Previous Clinical Trials:  None    Last Colonoscopy:   February 19, 2024-healthy ileocolonic anastomosis, no signs of ileal inflammation, normal colon, scarring in the perianal area and in the anal canal  July 2020 patent ileocolonic anastomosis, no active inflammation    Other Endoscopies:  Previous upper endoscopy with no significant findings    Imaging:   MRE:  Previous studies reviewed   CT:  Previous studies reviewed   Other:  Not applicable    Pertinent Labs:  Lab Results   Component Value Date    SEDRATE 36 (H) 12/19/2016    CRP <0.3 07/22/2024     No results found for: "TTGIGA", "IGA"  Lab Results   Component Value Date    TSH 3.434 01/11/2024     Lab Results   Component Value Date    HZHHYNGA12XQ 31 07/22/2024    VVZFJFKI62 514 07/22/2024     Lab Results   Component Value Date    HEPBSAG Non-reactive 07/22/2024    HEPBCAB Non-reactive 07/22/2024    HEPCAB Negative 03/24/2021     Lab Results   Component Value Date    BAB68ONMQ Negative 03/24/2021     Lab Results   Component Value Date    NIL 0.26713 07/22/2024    TBAG 0.067 07/05/2017    TBAGNIL 0.037 07/05/2017    MITOGENNIL 9.988 07/22/2024    TBGOLD Negative 07/05/2017     Lab Results   Component Value Date    TPTMINTERP SEE BELOW 03/24/2021     Lab Results   Component Value Date    STOOLCULTURE  09/07/2016     No Salmonella,Shigella,Vibrio,Campylobacter,Yersinia isolated.    SGEBNSRHDX7U Negative 09/07/2016    TWROABLGVU6O Negative 09/07/2016    CDIFFICILEAN Negative 09/07/2016    CDIFFTOX Negative 09/07/2016     Lab Results   Component Value Date    CALPROTECTIN <27.1 01/10/2024       Therapeutic Drug Monitoring Labs:  No results found for: "PROMETH"  No results found for: "ANSADAINIT", "INFLIXIMAB", " ""INFLIXINTERP"    Vaccinations:  Lab Results   Component Value Date    HEPBSAB Negative 03/24/2021     Lab Results   Component Value Date    HEPAIGG Negative 03/24/2021     Lab Results   Component Value Date    VARICELLAZOS 1.75 (H) 03/24/2021    VARICELLAINT Positive (A) 03/24/2021     Immunization History   Administered Date(s) Administered    Hepatitis A / Hepatitis B 06/18/2015    Hepatitis A, Adult 09/19/2014    Hepatitis B (recombinant) Adjuvanted, 2 dose 04/25/2022    Influenza 03/25/2013    Influenza - Quadrivalent 12/03/2015    Influenza - Trivalent - Afluria, Fluzone MDV 03/25/2013    Influenza Split 03/25/2013    Pneumococcal Conjugate - 13 Valent 09/05/2014    Tdap 06/18/2015    Zoster Recombinant 04/25/2022         Review of Systems  Review of Systems   Constitutional:  Negative for chills, fever and weight loss.   HENT:  Negative for sore throat.    Eyes:  Negative for pain, discharge and redness.   Respiratory:  Negative for cough, shortness of breath and wheezing.    Cardiovascular:  Negative for chest pain and leg swelling.   Gastrointestinal:  Negative for abdominal pain, blood in stool, constipation, diarrhea, heartburn, melena, nausea and vomiting.   Genitourinary:  Negative for dysuria and frequency.   Musculoskeletal:  Negative for back pain, joint pain and myalgias.   Skin:  Negative for rash.   Neurological:  Negative for focal weakness and seizures.   Endo/Heme/Allergies:  Does not bruise/bleed easily.   Psychiatric/Behavioral:  Positive for depression. The patient is not nervous/anxious.              Medical History:   Past Medical History:   Diagnosis Date    Allergy     seasonal    Crohn disease     Crohn's disease     DDD (degenerative disc disease), cervical     Joint pain     Keloid cicatrix     Ulcer        Surgical History:  Past Surgical History:   Procedure Laterality Date    ABCESS DRAINAGE      APPENDECTOMY      COLON SURGERY  2017    Laparoscopic ileocolic resection    " COLONOSCOPY N/A 12/5/2016    Procedure: COLONOSCOPY;  Surgeon: Steven Kerr MD;  Location: [facility] ENDO (4TH FLR);  Service: Endoscopy;  Laterality: N/A;    COLONOSCOPY N/A 7/30/2020    Procedure: COLONOSCOPY;  Surgeon: Steven Kerr MD;  Location: [facility] ENDO (4TH FLR);  Service: Endoscopy;  Laterality: N/A;  covid 7/27/20[place]  instructions given to patient in person-BB.7/28 Called pt to come in the morning. Cannot come in early. EC    COLONOSCOPY N/A 2/19/2024    Procedure: COLONOSCOPY;  Surgeon: Terry Stephen MD;  Location: [facility] ENDO (4TH FLR);  Service: Endoscopy;  Laterality: N/A;  Ref By:,instr sent via [place],[facility].  2/7[facility] for precall-MS    DIGITAL RECTAL EXAMINATION UNDER ANESTHESIA N/A 3/3/2021    Procedure: EXAM UNDER ANESTHESIA, DIGITAL, RECTUM;  Surgeon: Pete Diaz MD;  Location: [facility] OR 2ND FLR;  Service: Colon and Rectal;  Laterality: N/A;    ESOPHAGOGASTRODUODENOSCOPY N/A 7/30/2020    Procedure: EGD (ESOPHAGOGASTRODUODENOSCOPY);  Surgeon: Steven Kerr MD;  Location: [facility] ENDO (4TH FLR);  Service: Endoscopy;  Laterality: N/A;  covid 7/27/20-[facility] floor-BB  instructions given to patient in person-BB    EXAMINATION UNDER ANESTHESIA N/A 7/17/2020    Procedure: EXAM UNDER ANESTHESIA;  Surgeon: Pete Diaz MD;  Location: [facility] OR 2ND FLR;  Service: Colon and Rectal;  Laterality: N/A;    INCISION AND DRAINAGE OF ABSCESS N/A 3/3/2021    Procedure: INCISION AND DRAINAGE, ABSCESS;  Surgeon: Pete Diaz MD;  Location: [facility] OR 2ND FLR;  Service: Colon and Rectal;  Laterality: N/A;    INSERTION OF SETON STITCH N/A 3/3/2021    Procedure: PLACEMENT, SETON STITCH;  Surgeon: Pete Diaz MD;  Location: [facility] OR 2ND FLR;  Service: Colon and Rectal;  Laterality: N/A;    LAPAROSCOPIC RESECTION OF SMALL INTESTINE      RECTAL FISTULOTOMY N/A 7/17/2020    Procedure: FISTULOTOMY, RECTUM;  Surgeon: Pete Diaz MD;  Location: [facility] OR [address] [place] [address];  Service: Colon and Rectal;   Laterality: N/A;       Family History:   Family History   Problem Relation Name Age of Onset    Arthritis Father      Cancer Maternal Grandmother      Melanoma Neg Hx      Psoriasis Neg Hx      Lupus Neg Hx      Eczema Neg Hx      Acne Neg Hx      Colon cancer Neg Hx      Rectal cancer Neg Hx      Stomach cancer Neg Hx      Crohn's disease Neg Hx      Esophageal cancer Neg Hx      Irritable bowel syndrome Neg Hx      Ulcerative colitis Neg Hx      Celiac disease Neg Hx         Social History:   Social History     Tobacco Use    Smoking status: Never    Smokeless tobacco: Never   Substance Use Topics    Alcohol use: No    Drug use: No       Allergies: Reviewed    Home Medications:   Medication List with Changes/Refills   New Medications    ESCITALOPRAM OXALATE (LEXAPRO) 10 MG TABLET    Take 1 tablet (10 mg total) by mouth once daily.   Current Medications    ADALIMUMAB-ADAZ (HYRIMOZ,CF, PEN) 40 MG/0.4 ML PNIJ    Inject 1 Pen into the skin every 7 days.    CELECOXIB (CELEBREX) 200 MG CAPSULE    TAKE 1 CAPSULE(200 MG) BY MOUTH TWICE DAILY       Physical Exam:  Vital Signs:  /85 (BP Location: Left arm, Patient Position: Sitting)   Pulse 78   Temp 98.2 °F (36.8 °C)   Ht 6' (1.829 m)   Wt 79.1 kg (174 lb 6.1 oz)   SpO2 100%   BMI 23.65 kg/m²   Body mass index is 23.65 kg/m².    Physical Exam  Constitutional:       General: He is not in acute distress.     Appearance: Normal appearance. He is well-developed. He is not ill-appearing or toxic-appearing.   Cardiovascular:      Rate and Rhythm: Normal rate and regular rhythm.      Heart sounds: Normal heart sounds.   Pulmonary:      Effort: Pulmonary effort is normal.      Breath sounds: Normal breath sounds.   Abdominal:      General: Bowel sounds are normal. There is no distension.      Palpations: Abdomen is soft.      Tenderness: There is no abdominal tenderness.   Musculoskeletal:      Right lower leg: No edema.      Left lower leg: No edema.   Skin:      General: Skin is warm and dry.      Coloration: Skin is not pale.      Findings: No erythema or rash.   Neurological:      General: No focal deficit present.      Mental Status: He is alert and oriented to person, place, and time.   Psychiatric:         Behavior: Behavior normal.         Thought Content: Thought content normal.         Judgment: Judgment normal.         Labs: reviewed and pertinent noted above    Assessment/Plan:  Escobar Toro is a 44 y.o. male with ileal and perianal Crohn's disease with perianal fistulizing disease with recent abscess. The following issues were addresssed:    1. Immunosuppression due to drug therapy    2. Crohn's disease of both small and large intestine with fistula    3. Depression, unspecified depression type      1. Crohn's disease:  He is doing okay but has had some changes in his bowel habits.  I am not sure if some of these could be stress related but I think they warrant further evaluation.  He is due for labs we will get updated labs.  We will also check a stool calprotectin level.  Further plans based on these results.    2. Perianal abscess:  Setons removed.  Continue to monitor.  No active issues.    3. Depression:  Discussed possible use of medical therapy.  He has been discussing this with his wife as well for the last few weeks.  He is interested in starting medical therapy.  A prescription for Lexapro 10 mg was sent in to his pharmacy today.  I recommend that he take this for 4 weeks and at that point if he has not noticed any improvement we will plan to increase the dose to 20 mg daily.  I did advise him to establish care with a primary care physician who can manage this in the long term.  In addition to that, I encouraged him to speak with the therapist and he is planning to do that as well.    4. Immunosuppression:  We will discuss vaccinations in the future.      # IBD specific health maintenance:  Colon cancer surveillance:  Up-to-date-minimal colonic  involvement    Annual:  - Eye exam:  Discuss in the future  - Skin exam (if on IMM/TNF):  Recommend annual exam-referral placed today  - reminded pt to use sunblock/hats/sunprotective clothing  - PAP (if immunosuppressed):  Not applicable    DEXA:  Not applicable    Vitamin D:  Recent level was normal    Vaccines:    Influenza:  Recommend annual vaccination   Pneumovax:  Recommend vaccination   HAV:  Needs vaccination   HBV:  Needs vaccination   Tdap:  Up-to-date-done in 2015   MMR:  Up-to-date   VZV:  Immune   HZV:  Recommend vaccination   HPV:  Not applicable   Meningococcus:  Not applicable   COVID:  Recommend vaccination    Follow up: Follow up in about 6 months (around 7/13/2025).    Visit today is associated with current or anticipated ongoing medical care related to this patient's single serious condition/complex condition (Crohn's disease).      Thank you again for sending Escobar Toro to see Dr. Gutierrez Stephen today at the [facility]. Please don't hesitate to contact Dr. Stephen if there are any questions regarding this evaluation, or if you have any other patients with inflammatory bowel disease for whom you would like a consultation. You can reach Dr. Stephen at [phone] or by email at [email or web address]    Terry Stephen MD  Department of Gastroenterology  Inflammatory Bowel Disease

## 2025-01-13 NOTE — PATIENT INSTRUCTIONS
Continue [facility]  Labs today  Submit stool sample soon  Trial of Lexapro  Follow up in 6 months

## 2025-01-16 LAB — CALPROTECTIN STL-MCNT: 177 MCG/G

## 2025-01-17 ENCOUNTER — PATIENT MESSAGE (OUTPATIENT)
Dept: GASTROENTEROLOGY | Facility: CLINIC | Age: 45
End: 2025-01-17
Payer: COMMERCIAL

## 2025-01-17 ENCOUNTER — TELEPHONE (OUTPATIENT)
Dept: GASTROENTEROLOGY | Facility: CLINIC | Age: 45
End: 2025-01-17
Payer: COMMERCIAL

## 2025-01-17 DIAGNOSIS — Z51.81 THERAPEUTIC DRUG MONITORING: ICD-10-CM

## 2025-01-17 DIAGNOSIS — K50.813 CROHN'S DISEASE OF BOTH SMALL AND LARGE INTESTINE WITH FISTULA: Primary | ICD-10-CM

## 2025-01-17 NOTE — TELEPHONE ENCOUNTER
----- Message from Terry Stephen MD sent at 1/17/2025  7:11 AM CST -----  I would like to get him set up for a trough Humira level in the near future.  Thank you.

## 2025-01-23 NOTE — TELEPHONE ENCOUNTER
Spoke with Escobar:   - reviewed unread message from 1/17  - takes Hyrimoz on Thursday evenings  - agrees to lab in 1 wk (labs closed today d/t weather)

## 2025-01-30 ENCOUNTER — LAB VISIT (OUTPATIENT)
Dept: LAB | Facility: HOSPITAL | Age: 45
End: 2025-01-30
Attending: INTERNAL MEDICINE
Payer: COMMERCIAL

## 2025-01-30 DIAGNOSIS — K50.813 CROHN'S DISEASE OF BOTH SMALL AND LARGE INTESTINE WITH FISTULA: ICD-10-CM

## 2025-01-30 DIAGNOSIS — Z51.81 THERAPEUTIC DRUG MONITORING: ICD-10-CM

## 2025-01-30 PROCEDURE — 36415 COLL VENOUS BLD VENIPUNCTURE: CPT | Performed by: INTERNAL MEDICINE

## 2025-01-30 PROCEDURE — 80145 DRUG ASSAY ADALIMUMAB: CPT | Performed by: INTERNAL MEDICINE

## 2025-02-03 LAB — ADALIMUMAB SERPL IA-MCNC: 23 MCG/ML

## 2025-02-07 ENCOUNTER — PATIENT MESSAGE (OUTPATIENT)
Dept: GASTROENTEROLOGY | Facility: CLINIC | Age: 45
End: 2025-02-07
Payer: COMMERCIAL

## 2025-02-07 DIAGNOSIS — K50.813 CROHN'S DISEASE OF BOTH SMALL AND LARGE INTESTINE WITH FISTULA: Primary | ICD-10-CM

## 2025-02-17 DIAGNOSIS — K50.813 CROHN'S DISEASE OF BOTH SMALL AND LARGE INTESTINE WITH FISTULA: ICD-10-CM

## 2025-02-18 RX ORDER — ADALIMUMAB-ADAZ 40 MG/.4ML
INJECTION, SOLUTION SUBCUTANEOUS
Qty: 4 PEN | Refills: 4 | Status: SHIPPED | OUTPATIENT
Start: 2025-02-18

## 2025-02-18 NOTE — TELEPHONE ENCOUNTER
"Primary provider: Dr. Terry Stephen    IBD medications: Hyrimoz 40 mg weekly  Start Date: 04/07/2021     Refill request for:      Pended Medication(s)   Requested Prescriptions     Pending Prescriptions Disp Refills    HYRIMOZ,CF, PEN 40 mg/0.4 mL PnIj [Pharmacy Med Name: HYRIMOZ PEN 40MG/0.4ML]  4     Sig: INJECT 1 PEN UNDER THE SKIN EVERY 7 DAYS           Allergies reviewed: Yes    Drug Monitoring labs/frequency for all IBD meds:    CBC and CMP every 6 months  TB and Hep B every 12 months    Lab Results   Component Value Date    HEPBSAG Non-reactive 07/22/2024    HEPBCAB Non-reactive 07/22/2024     Lab Results   Component Value Date    TBGOLDPLUS Negative 07/22/2024     Lab Results   Component Value Date    QUANTIFERON Negative 12/17/2013      No results found for: "TSPOTSCREN"  Lab Results   Component Value Date    [id number] 31 07/22/2024    ZEDLNKVS33 514 07/22/2024     Lab Results   Component Value Date    WBC 4.64 01/13/2025    HGB 13.7 (L) 01/13/2025    HCT 42.9 01/13/2025    MCV 93 01/13/2025     01/13/2025     Lab Results   Component Value Date    CREATININE 1.0 01/13/2025    ALBUMIN 4.2 01/13/2025    BILITOT 1.4 (H) 01/13/2025    ALKPHOS 75 01/13/2025    AST 14 01/13/2025    ALT 16 01/13/2025       Lab due date (mo/yr):  7/2025    Labs scheduled: No - but patient has an OV before or when labs are due     Next appt: 7/14/25    RX refill sent to provider for amount until next labs: Yes       "

## 2025-03-20 ENCOUNTER — PATIENT MESSAGE (OUTPATIENT)
Dept: GASTROENTEROLOGY | Facility: CLINIC | Age: 45
End: 2025-03-20
Payer: COMMERCIAL

## 2025-03-20 RX ORDER — ESCITALOPRAM OXALATE 10 MG/1
10 TABLET ORAL DAILY
Qty: 90 TABLET | Refills: 0 | Status: SHIPPED | OUTPATIENT
Start: 2025-03-20 | End: 2026-03-20

## 2025-03-28 ENCOUNTER — LAB VISIT (OUTPATIENT)
Dept: LAB | Facility: HOSPITAL | Age: 45
End: 2025-03-28
Payer: COMMERCIAL

## 2025-03-28 DIAGNOSIS — K50.813 CROHN'S DISEASE OF BOTH SMALL AND LARGE INTESTINE WITH FISTULA: ICD-10-CM

## 2025-03-28 PROCEDURE — 83993 ASSAY FOR CALPROTECTIN FECAL: CPT

## 2025-04-01 LAB
CALPROTECTIN INTERP (OHS): NORMAL
CALPROTECTIN STOOL (OHS): 23.8

## 2025-04-02 ENCOUNTER — RESULTS FOLLOW-UP (OUTPATIENT)
Dept: GASTROENTEROLOGY | Facility: CLINIC | Age: 45
End: 2025-04-02

## 2025-07-08 DIAGNOSIS — K50.813 CROHN'S DISEASE OF BOTH SMALL AND LARGE INTESTINE WITH FISTULA: ICD-10-CM

## 2025-07-08 RX ORDER — ADALIMUMAB-ADAZ 40 MG/.4ML
40 INJECTION, SOLUTION SUBCUTANEOUS
Qty: 4 PEN | Refills: 0 | Status: SHIPPED | OUTPATIENT
Start: 2025-07-08

## 2025-07-08 NOTE — TELEPHONE ENCOUNTER
"Primary provider: Dr. Terry Stephen    IBD medications: Hyrimoz 40 mg SC Q 7 days    Refill request for:      Pended Medication(s)   Requested Prescriptions     Pending Prescriptions Disp Refills    HYRIMOZ,CF, PEN 40 mg/0.4 mL PnIj [Pharmacy Med Name: HYRIMOZ PEN 40MG/0.4ML]  4     Sig: INJECT 1 PEN UNDER THE SKIN EVERY 7 DAYS           Allergies reviewed: Yes    Drug Monitoring labs/frequency for all IBD meds:    CBC and CMP every 6 months  TB and Hep B every 12 months    Lab Results   Component Value Date    HEPBSAG Non-reactive 07/22/2024    HEPBCAB Non-reactive 07/22/2024     Lab Results   Component Value Date    TBGOLDPLUS Negative 07/22/2024     Lab Results   Component Value Date    QUANTIFERON Negative 12/17/2013      No results found for: "TSPOTSCREN"  Lab Results   Component Value Date    [id number] 31 07/22/2024    UMFUOWJG07 514 07/22/2024     Lab Results   Component Value Date    WBC 4.64 01/13/2025    HGB 13.7 (L) 01/13/2025    HCT 42.9 01/13/2025    MCV 93 01/13/2025     01/13/2025     Lab Results   Component Value Date    CREATININE 1.0 01/13/2025    ALBUMIN 4.2 01/13/2025    BILITOT 1.4 (H) 01/13/2025    ALKPHOS 75 01/13/2025    AST 14 01/13/2025    ALT 16 01/13/2025     Lab due date (mo/yr):  7/25    Labs scheduled: No - but patient has an OV before or when labs are due     Next appt: 7/14/25    RX refill sent to provider for amount until next labs: Yes - 1 refill only;        "

## 2025-07-14 ENCOUNTER — OFFICE VISIT (OUTPATIENT)
Dept: GASTROENTEROLOGY | Facility: CLINIC | Age: 45
End: 2025-07-14
Payer: COMMERCIAL

## 2025-07-14 ENCOUNTER — LAB VISIT (OUTPATIENT)
Dept: LAB | Facility: HOSPITAL | Age: 45
End: 2025-07-14
Attending: INTERNAL MEDICINE
Payer: COMMERCIAL

## 2025-07-14 ENCOUNTER — OFFICE VISIT (OUTPATIENT)
Dept: INTERNAL MEDICINE | Facility: CLINIC | Age: 45
End: 2025-07-14
Payer: COMMERCIAL

## 2025-07-14 VITALS
SYSTOLIC BLOOD PRESSURE: 135 MMHG | DIASTOLIC BLOOD PRESSURE: 89 MMHG | BODY MASS INDEX: 24.16 KG/M2 | HEART RATE: 105 BPM | HEIGHT: 72 IN | WEIGHT: 178.38 LBS | TEMPERATURE: 99 F | OXYGEN SATURATION: 96 %

## 2025-07-14 VITALS
OXYGEN SATURATION: 97 % | HEART RATE: 94 BPM | BODY MASS INDEX: 24.1 KG/M2 | SYSTOLIC BLOOD PRESSURE: 106 MMHG | DIASTOLIC BLOOD PRESSURE: 84 MMHG | TEMPERATURE: 99 F | WEIGHT: 177.94 LBS | HEIGHT: 72 IN

## 2025-07-14 DIAGNOSIS — Z79.899 IMMUNOSUPPRESSION DUE TO DRUG THERAPY: ICD-10-CM

## 2025-07-14 DIAGNOSIS — D84.821 IMMUNOSUPPRESSION DUE TO DRUG THERAPY: ICD-10-CM

## 2025-07-14 DIAGNOSIS — F32.A DEPRESSION, UNSPECIFIED DEPRESSION TYPE: ICD-10-CM

## 2025-07-14 DIAGNOSIS — U07.1 COVID-19: Primary | ICD-10-CM

## 2025-07-14 DIAGNOSIS — K50.813 CROHN'S DISEASE OF BOTH SMALL AND LARGE INTESTINE WITH FISTULA: ICD-10-CM

## 2025-07-14 DIAGNOSIS — K50.813 CROHN'S DISEASE OF BOTH SMALL AND LARGE INTESTINE WITH FISTULA: Primary | ICD-10-CM

## 2025-07-14 DIAGNOSIS — U07.1 COVID-19: ICD-10-CM

## 2025-07-14 DIAGNOSIS — B34.9 VIRAL ILLNESS: ICD-10-CM

## 2025-07-14 PROBLEM — K60.30 ANAL FISTULA: Status: RESOLVED | Noted: 2020-07-17 | Resolved: 2025-07-14

## 2025-07-14 LAB
ABSOLUTE EOSINOPHIL (OHS): 0.04 K/UL
ABSOLUTE MONOCYTE (OHS): 0.99 K/UL (ref 0.3–1)
ABSOLUTE NEUTROPHIL COUNT (OHS): 1.67 K/UL (ref 1.8–7.7)
ALBUMIN SERPL BCP-MCNC: 4.1 G/DL (ref 3.5–5.2)
ALP SERPL-CCNC: 77 UNIT/L (ref 40–150)
ALT SERPL W/O P-5'-P-CCNC: 32 UNIT/L (ref 10–44)
ANION GAP (OHS): 9 MMOL/L (ref 8–16)
AST SERPL-CCNC: 27 UNIT/L (ref 11–45)
BASOPHILS # BLD AUTO: 0.02 K/UL
BASOPHILS NFR BLD AUTO: 0.6 %
BILIRUB SERPL-MCNC: 0.8 MG/DL (ref 0.1–1)
BUN SERPL-MCNC: 16 MG/DL (ref 6–20)
CALCIUM SERPL-MCNC: 8.7 MG/DL (ref 8.7–10.5)
CHLORIDE SERPL-SCNC: 108 MMOL/L (ref 95–110)
CO2 SERPL-SCNC: 23 MMOL/L (ref 23–29)
CREAT SERPL-MCNC: 1.3 MG/DL (ref 0.5–1.4)
CRP SERPL-MCNC: 2 MG/L
CTP QC/QA: YES
CTP QC/QA: YES
ERYTHROCYTE [DISTWIDTH] IN BLOOD BY AUTOMATED COUNT: 12 % (ref 11.5–14.5)
GFR SERPLBLD CREATININE-BSD FMLA CKD-EPI: >60 ML/MIN/1.73/M2
GLUCOSE SERPL-MCNC: 92 MG/DL (ref 70–110)
HBV CORE AB SERPL QL IA: NORMAL
HBV SURFACE AG SERPL QL IA: NORMAL
HCT VFR BLD AUTO: 41.3 % (ref 40–54)
HGB BLD-MCNC: 13.4 GM/DL (ref 14–18)
IMM GRANULOCYTES # BLD AUTO: 0.01 K/UL (ref 0–0.04)
IMM GRANULOCYTES NFR BLD AUTO: 0.3 % (ref 0–0.5)
LYMPHOCYTES # BLD AUTO: 0.73 K/UL (ref 1–4.8)
MCH RBC QN AUTO: 29.6 PG (ref 27–31)
MCHC RBC AUTO-ENTMCNC: 32.4 G/DL (ref 32–36)
MCV RBC AUTO: 91 FL (ref 82–98)
NUCLEATED RBC (/100WBC) (OHS): 0 /100 WBC
PLATELET # BLD AUTO: 209 K/UL (ref 150–450)
PMV BLD AUTO: 10.5 FL (ref 9.2–12.9)
POC MOLECULAR INFLUENZA A AGN: NEGATIVE
POC MOLECULAR INFLUENZA B AGN: NEGATIVE
POTASSIUM SERPL-SCNC: 4 MMOL/L (ref 3.5–5.1)
PROT SERPL-MCNC: 8.2 GM/DL (ref 6–8.4)
RBC # BLD AUTO: 4.53 M/UL (ref 4.6–6.2)
RELATIVE EOSINOPHIL (OHS): 1.2 %
RELATIVE LYMPHOCYTE (OHS): 21.1 % (ref 18–48)
RELATIVE MONOCYTE (OHS): 28.6 % (ref 4–15)
RELATIVE NEUTROPHIL (OHS): 48.2 % (ref 38–73)
SARS-COV-2 RDRP RESP QL NAA+PROBE: POSITIVE
SODIUM SERPL-SCNC: 140 MMOL/L (ref 136–145)
WBC # BLD AUTO: 3.46 K/UL (ref 3.9–12.7)

## 2025-07-14 PROCEDURE — 87502 INFLUENZA DNA AMP PROBE: CPT | Mod: QW,S$GLB,, | Performed by: INTERNAL MEDICINE

## 2025-07-14 PROCEDURE — 3079F DIAST BP 80-89 MM HG: CPT | Mod: CPTII,S$GLB,, | Performed by: INTERNAL MEDICINE

## 2025-07-14 PROCEDURE — 3074F SYST BP LT 130 MM HG: CPT | Mod: CPTII,S$GLB,, | Performed by: INTERNAL MEDICINE

## 2025-07-14 PROCEDURE — 80053 COMPREHEN METABOLIC PANEL: CPT

## 2025-07-14 PROCEDURE — 99214 OFFICE O/P EST MOD 30 MIN: CPT | Mod: S$GLB,,, | Performed by: INTERNAL MEDICINE

## 2025-07-14 PROCEDURE — 86140 C-REACTIVE PROTEIN: CPT

## 2025-07-14 PROCEDURE — 86706 HEP B SURFACE ANTIBODY: CPT

## 2025-07-14 PROCEDURE — 3008F BODY MASS INDEX DOCD: CPT | Mod: CPTII,S$GLB,, | Performed by: INTERNAL MEDICINE

## 2025-07-14 PROCEDURE — G2211 COMPLEX E/M VISIT ADD ON: HCPCS | Mod: S$GLB,,, | Performed by: INTERNAL MEDICINE

## 2025-07-14 PROCEDURE — 85025 COMPLETE CBC W/AUTO DIFF WBC: CPT

## 2025-07-14 PROCEDURE — 1160F RVW MEDS BY RX/DR IN RCRD: CPT | Mod: CPTII,S$GLB,, | Performed by: INTERNAL MEDICINE

## 2025-07-14 PROCEDURE — 1159F MED LIST DOCD IN RCRD: CPT | Mod: CPTII,S$GLB,, | Performed by: INTERNAL MEDICINE

## 2025-07-14 PROCEDURE — 87635 SARS-COV-2 COVID-19 AMP PRB: CPT | Mod: QW,S$GLB,, | Performed by: INTERNAL MEDICINE

## 2025-07-14 PROCEDURE — 87340 HEPATITIS B SURFACE AG IA: CPT

## 2025-07-14 PROCEDURE — 86704 HEP B CORE ANTIBODY TOTAL: CPT

## 2025-07-14 PROCEDURE — 99999 PR PBB SHADOW E&M-EST. PATIENT-LVL III: CPT | Mod: PBBFAC,,, | Performed by: INTERNAL MEDICINE

## 2025-07-14 PROCEDURE — 86480 TB TEST CELL IMMUN MEASURE: CPT

## 2025-07-14 PROCEDURE — 36415 COLL VENOUS BLD VENIPUNCTURE: CPT

## 2025-07-14 PROCEDURE — 3075F SYST BP GE 130 - 139MM HG: CPT | Mod: CPTII,S$GLB,, | Performed by: INTERNAL MEDICINE

## 2025-07-14 NOTE — PROGRESS NOTES
INTERNAL MEDICINE CLINIC - SAME DAY APPOINTMENT  Progress Note    PRESENTING HISTORY     PCP: No, Primary Doctor    Chief Complaint/Reason for Visit:     Chief Complaint   Patient presents with    Back Pain    Cough    Nasal Congestion    Fever      History of Present Illness & ROS : Mr. Escobar Toro is a 45 y.o. male.      Started Saturday with fever, cough, and nasal congestion.  Mild SOB.    PAST HISTORY:     Past Medical History:   Diagnosis Date    Anal fistula 07/17/2020    Crohn's disease of both small and large intestine with fistula 03/25/2013    DDD (degenerative disc disease), cervical     Keloid cicatrix        Past Surgical History:   Procedure Laterality Date    ABCESS DRAINAGE      APPENDECTOMY      COLON SURGERY  2017    Laparoscopic ileocolic resection    COLONOSCOPY N/A 12/5/2016    Procedure: COLONOSCOPY;  Surgeon: Steven Kerr MD;  [facility]  Service: Endoscopy;  Laterality: N/A;    COLONOSCOPY N/A 7/30/2020    Procedure: COLONOSCOPY;  Surgeon: Steven Kerr MD;  [facility]  Service: Endoscopy;  Laterality: N/A;  covid 7/27/20-[facility]  instructions given to patient in person-BB.7/28 Called pt to come in the morning. Cannot come in early. EC    COLONOSCOPY N/A 2/19/2024    Procedure: COLONOSCOPY;  Surgeon: Terry Stephen MD;  [facility]  Service: Endoscopy;  Laterality: N/A;  Ref By:,preet sent via portal,[facility].  2/7[facility] for precall-MS    DIGITAL RECTAL EXAMINATION UNDER ANESTHESIA N/A 3/3/2021    Procedure: EXAM UNDER ANESTHESIA, DIGITAL, RECTUM;  Surgeon: Pete Diaz MD;  [facility];  Service: Colon and Rectal;  Laterality: N/A;    ESOPHAGOGASTRODUODENOSCOPY N/A 7/30/2020    Procedure: EGD (ESOPHAGOGASTRODUODENOSCOPY);  Surgeon: Steven Kerr MD;  Location: [facility] ENDO (4TH FLR);  Service: Endoscopy;  Laterality: N/A;  covid 7/27/20-[facility]2nd [facility]-BB  instructions given to  patient in person-BB    EXAMINATION UNDER ANESTHESIA N/A 7/17/2020    Procedure: EXAM UNDER ANESTHESIA;  Surgeon: Pete Diaz MD;  Location: [facility] OR 2ND FLR;  Service: Colon and Rectal;  Laterality: N/A;    INCISION AND DRAINAGE OF ABSCESS N/A 3/3/2021    Procedure: INCISION AND DRAINAGE, ABSCESS;  Surgeon: Pete Diaz MD;  Location: [facility] OR 2ND FLR;  Service: Colon and Rectal;  Laterality: N/A;    INSERTION OF SETON STITCH N/A 3/3/2021    Procedure: PLACEMENT, SETON STITCH;  Surgeon: Pete Diaz MD;  Location: [facility] OR 2ND FLR;  Service: Colon and Rectal;  Laterality: N/A;    LAPAROSCOPIC RESECTION OF SMALL INTESTINE      RECTAL FISTULOTOMY N/A 7/17/2020    Procedure: FISTULOTOMY, RECTUM;  Surgeon: Pete Diaz MD;  Location: [facility] OR 2ND FLR;  Service: Colon and Rectal;  Laterality: N/A;       Family History   Problem Relation Name Age of Onset    Arthritis Father      Cancer Maternal Grandmother      Melanoma Neg Hx      Psoriasis Neg Hx      Lupus Neg Hx      Eczema Neg Hx      Acne Neg Hx      Colon cancer Neg Hx      Rectal cancer Neg Hx      Stomach cancer Neg Hx      Crohn's disease Neg Hx      Esophageal cancer Neg Hx      Irritable bowel syndrome Neg Hx      Ulcerative colitis Neg Hx      Celiac disease Neg Hx         Social History     Socioeconomic History    Marital status:     Number of children: 1   Occupational History    Occupation: construction      Employer: [employer]   Tobacco Use    Smoking status: Never    Smokeless tobacco: Never   Substance and Sexual Activity    Alcohol use: No    Drug use: No    Sexual activity: Yes     Social Drivers of Health     Financial Resource Strain: Low Risk  (7/14/2025)    Overall Financial Resource Strain (CARDIA)     Difficulty of Paying Living Expenses: Not very hard   Food Insecurity: Food Insecurity Present (7/14/2025)    Hunger Vital Sign     Worried About Running Out of Food in the Last Year: Sometimes true     Ran Out of Food in the Last  Year: Never true   Transportation Needs: No Transportation Needs (7/14/2025)    PRAPARE - Transportation     Lack of Transportation (Medical): No     Lack of Transportation (Non-Medical): No   Physical Activity: Sufficiently Active (7/14/2025)    Exercise Vital Sign     Days of Exercise per Week: 6 days     Minutes of Exercise per Session: 40 min   Stress: Stress Concern Present (7/14/2025)    [employer] [place] [employer] - Occupational Stress Questionnaire     Feeling of Stress : To some extent   Housing Stability: Low Risk  (7/14/2025)    Housing Stability Vital Sign     Unable to Pay for Housing in the Last Year: No     Number of Times Moved in the Last Year: 0     Homeless in the Last Year: No       MEDICATIONS & ALLERGIES:     Medications Ordered Prior to Encounter[1]     Review of patient's allergies indicates:   Allergen Reactions    Remicade [infliximab] Swelling     Throat swelled       Medications Reconciliation:   I have reconciled the patient's home medications with the patient/family. I have updated all changes.  Refer to After-Visit Medication List.    OBJECTIVE:     Vital Signs:  Vitals:    07/14/25 1005   BP: 106/84   Pulse: 94   Temp: 99 °F (37.2 °C)     Wt Readings from Last 3 Encounters:   07/14/25 1005 80.7 kg (177 lb 14.6 oz)   07/14/25 0750 80.9 kg (178 lb 5.6 oz)   01/13/25 0840 79.1 kg (174 lb 6.1 oz)     Body mass index is 24.13 kg/m².   Pulse Ox 97% on room air.    Physical Exam:  General:  No distress.   HEENT: Head is normocephalic, atraumatic   Eyes: Clear conjunctiva.  Neck: Supple, symmetrical neck; trachea midline.  Lungs: Clear to auscultation bilaterally and normal respiratory effort.  Cardiovascular: Heart with regular rate and rhythm.    Musculoskeletal: Normal gait.   Psychiatric: Normal affect. Alert.    Laboratory  Lab Results   Component Value Date    WBC 4.64 01/13/2025    HGB 13.7 (L) 01/13/2025    HCT 42.9 01/13/2025     01/13/2025    CHOL 186 06/17/2015     TRIG 103 05/06/2014    HDL 32 (L) 05/06/2014    ALT 16 01/13/2025    AST 14 01/13/2025     01/13/2025    K 4.2 01/13/2025     01/13/2025    CREATININE 1.0 01/13/2025    BUN 18 01/13/2025    CO2 25 01/13/2025    TSH 2.415 01/13/2025    INR 1.0 09/07/2016     ASSESSMENT & PLAN:     COVID-19  Immunosuppression due to drug therapy  -     POCT COVID-19 Rapid Screening: positive  -     POCT Influenza A/B Molecular: negative    Rx  -     nirmatrelvir-ritonavir 300 mg (150 mg x 2)-100 mg copackaged tablets (EUA); Take 3 tablets by mouth 2 (two) times daily for 5 days. Each dose contains 2 nirmatrelvir (pink tablets) and 1 ritonavir (white tablet). Take all 3 tablets together  Dispense: 30 tablet; Refill: 0    Scheduled Follow-up :  Future Appointments   Date Time Provider Department Center   1/20/2026  9:00 AM Iban Quevedo MD [facility]       After Visit Medication List :     Medication List            Accurate as of July 14, 2025 10:38 AM. If you have any questions, ask your nurse or doctor.                START taking these medications      nirmatrelvir-ritonavir 300 mg (150 mg x 2)-100 mg copackaged tablets (EUA)  Take 3 tablets by mouth 2 (two) times daily for 5 days. Each dose contains 2 nirmatrelvir (pink tablets) and 1 ritonavir (white tablet). Take all 3 tablets together  Started by: Jordy Gleason MD            CONTINUE taking these medications      adalimumab-adaz 40 mg/0.4 mL Pnij  Commonly known as: HYRIMOZ(CF) PEN  Inject 0.4 mLs (40 mg total) into the skin every 7 days.     celecoxib 200 MG capsule  Commonly known as: CeleBREX  TAKE 1 CAPSULE(200 MG) BY MOUTH TWICE DAILY            STOP taking these medications      EScitalopram oxalate 10 MG tablet  Commonly known as: LEXAPRO  Stopped by: Terry Stephen MD               Where to Get Your Medications        These medications were sent to [facility]  [address] [facility] [address]      Hours: Always  Open Phone: [phone]   nirmatrelvir-ritonavir 300 mg (150 mg x 2)-100 mg copackaged tablets (EUA)         Signing Physician:  Jordy Gleason MD         [1]   Current Outpatient Medications on File Prior to Visit   Medication Sig Dispense Refill    adalimumab-adaz (HYRIMOZ,CF, PEN) 40 mg/0.4 mL PnIj Inject 0.4 mLs (40 mg total) into the skin every 7 days. 4 pen 0    celecoxib (CELEBREX) 200 MG capsule TAKE 1 CAPSULE(200 MG) BY MOUTH TWICE DAILY (Patient not taking: Reported on 7/14/2025) 60 capsule 1    [DISCONTINUED] EScitalopram oxalate (LEXAPRO) 10 MG tablet Take 1 tablet (10 mg total) by mouth once daily. 90 tablet 0     Current Facility-Administered Medications on File Prior to Visit   Medication Dose Route Frequency Provider Last Rate Last Admin    0.9%  NaCl infusion   Intravenous Continuous Dara Han NP   Stopped at 03/03/21 0842    0.9%  NaCl infusion   Intravenous Continuous Lina Tompkins NP        mupirocin 2 % ointment   Nasal On Call Procedure Dara Han NP        mupirocin 2 % ointment   Nasal On Call Procedure Lina Tompkins NP   Given at 03/03/21 0743

## 2025-07-14 NOTE — PROGRESS NOTES
[facility]          Inflammatory Bowel Disease Follow Up Note         TODAY'S VISIT DATE:  7/14/2025    Reason for Consult:    Chief Complaint   Patient presents with    Follow-up    Crohn's Disease       PCP: Hemalatha, Primary Doctor      Referring MD:   No ref. provider found    History of Present Illness:  Escobar Toro who is a 45 y.o. male is being seen today at the [facility] on 07/14/2025 for inflammatory bowel disease- Crohn's disease.  The Crohn's disease standpoint he has been doing well.  When I saw him in January he was having some intermittent increasing symptoms and a stool calprotectin level was mildly elevated.  At that time he was having a lot of issues with anxiety and depression.  His Humira level was 23 with no antibodies present.  We did not make any adjustments at that time and repeated his calprotectin level in March and it was back down to normal.  He reports that from a gastrointestinal standpoint things have been relatively stable.  He has 2-3 bowel movements a day that are you were between soft in formed.  Occasionally he has a loose bowel movement.  He has not seen any blood in his stools and has not had any issues with perianal disease.  He did try Lexapro for his depression issues.  He felt like this did help the depression but it did cause a lot of gastrointestinal side effects.  He reports that he took it for 2 months but had to stop it because of significant diarrhea issues.  Once he stopped the medication is diarrhea issues resolved.  Today he reports that since Saturday he has been feeling ill.  He has been having a low-grade fever, chills, myalgias, joint and back pains.  He has a mild cough.  He denies any sore throat or productive cough.  He denies any recent changes in his bowel movements.  His last dose of Humira was on July 10 in his next dose is scheduled for July 17.    IBD History:  He is here for  management of Crohn's disease.  He was diagnosed with Crohn's disease around 1998. He reported symptoms for about 6 years prior to the diagnosis.  He was initially managed by Dr. Delcid at [facility].  He was initially treated with steroids as well as multiple aminosalicylates without any improvement.  Subsequently, Remicade was started and he took this from 1458-5271 but this was stopped in 2009 because of development of antibodies and infusion reactions.  He was then started on Humira.  He began seeing [facility] around 2013. In June of 2014 he was admitted to the hospital with symptoms of active Crohn's disease and imaging at that time showed a stricture in the terminal ileum.  He was managed with steroids and antibiotics and Humira was maintained after that.  It was noted that he had had some issues with pustular lesions of the skin when taking Humira and was seeing Dermatology for this issue.  He was found to have MRSA and underwent treatment for folliculitis.  In September 2016 he presented again with nausea, vomiting, and diarrhea.  A repeat CT scan is again revealed narrowing of the terminal ileum with chronic changes from prior inflammatory episodes but no findings suggestive of active inflammation.  He was managed conservatively at that time and underwent colonoscopy on December 5, 2016. At that time there was no evidence of active inflammation but a severe stricture of the terminal ileum was identified.  He was referred to Dr. Diaz with Colorectal surgery and underwent ileocolonic resection on January 4, 2017. He was supposed to restart Humira after surgery but because of insurance reasons it was not started.  At that time he had developed a recurrent perianal abscess that resolved.  Because of his social situation he did not he did not follow up after 2018. He presented again in July of 2020 with complaints of swelling and purulent drainage in the perianal area.  He had been off of medication  for quite some time.  He saw Dr. Diaz because of these issues and was scheduled for exam under anesthesia in July 2020. At that time of fistulotomy was performed.  A colonoscopy done July 30, 2020 did not reveal any active inflammation and revealed a patent anastomosis.  An adalimumab level and antibodies was performed August (when he had been off of therapy for over 3 years) and there were no antibodies present.  The plan was to restart Humira but this was never done.  He presented again March 1, 2021 with perianal pain and purulence/brownish discharge from the anal canal and was again found to have a perianal abscess.  He underwent exam under anesthesia with seton placement on March 4th.  He had a CT scan prior to surgery that did not reveal any evidence of significant inflammation within the bowel.  He started azathioprine and Humira on April 7, 2021. In September 2021 we increased the dose of Humira to once weekly.  In the summer of 2023 a follow-up stool calprotectin level was undetectable. He underwent a follow-up colonoscopy February 19, 2024.  He had no evidence of active disease.  He did have some scarring in the perianal area from prior perianal disease.  Based on these results we decided to stop azathioprine.     IBD Details:  Dx Date:  1998-symptoms since 1992  Disease type/distribution:  Crohn's disease/ileal involvement and perianal disease  Current Treatment:  Adalimumab 40 mg weekly  Start Date:  04/07/2021 Response:  Endoscopic and histologic remission  Optimized:  Yes   Adverse reactions:  None  Prior surgeries:  Multiple incisions and drainage of abscesses, fistulotomy; January 2017-ileocecal resection for stricturing disease  CRP Elevation: Y  Disease Complications:  Stricturing disease, perianal fistulizing disease with abscess  Extraintestinal manifestations:  None  Prior treatments:   Steroids:  Good response in the past  5ASA:  No response  IMM:   Unclear response to azathioprine-fatigue felt  "to be related to azathioprine-improved with time   Flu like symptoms with methotrexate  TNF Inh:   Remicade-good response, developed infusion reactions and was found to have high levels of antibodies    Adalimumab-good response, stopped prior to surgery in 2017, restarted in April 2021   Anti-Integrin:  None   IL 12/23:  None  PAOLO Inh:  None    Previous Clinical Trials:  None    Last Colonoscopy:   February 19, 2024-healthy ileocolonic anastomosis, no signs of ileal inflammation, normal colon, scarring in the perianal area and in the anal canal  July 2020 patent ileocolonic anastomosis, no active inflammation    Other Endoscopies:  Previous upper endoscopy with no significant findings    Imaging:   MRE:  Previous studies reviewed   CT:  Previous studies reviewed   Other:  Not applicable    Pertinent Labs:  Lab Results   Component Value Date    SEDRATE 36 (H) 12/19/2016    CRP 0.6 01/13/2025     No results found for: "TTGIGA", "IGA"  Lab Results   Component Value Date    TSH 2.415 01/13/2025     Lab Results   Component Value Date    MDXBSDOB08WA 31 07/22/2024    GNOCHTPY77 514 07/22/2024     Lab Results   Component Value Date    HEPBSAG Non-reactive 07/22/2024    HEPBCAB Non-reactive 07/22/2024    HEPCAB Negative 03/24/2021     Lab Results   Component Value Date    JFA79QPZC Negative 03/24/2021     Lab Results   Component Value Date    NIL 0.11222 07/22/2024    TBAG 0.067 07/05/2017    TBAGNIL 0.037 07/05/2017    MITOGENNIL 9.988 07/22/2024    TBGOLD Negative 07/05/2017     Lab Results   Component Value Date    TPTMINTERP SEE BELOW 03/24/2021     Lab Results   Component Value Date    STOOLCULTURE  09/07/2016     No Salmonella,Shigella,Vibrio,Campylobacter,Yersinia isolated.    KXWQNNMNWQ7E Negative 09/07/2016    BMFXVRVVUP8N Negative 09/07/2016    CDIFFICILEAN Negative 09/07/2016    CDIFFTOX Negative 09/07/2016     Lab Results   Component Value Date    CALPROTECTIN 23.8 03/28/2025       Therapeutic Drug Monitoring " "Labs:  No results found for: "PROMETH"  No results found for: "ANSADAINIT", "INFLIXIMAB", "INFLIXINTERP"    Vaccinations:  Lab Results   Component Value Date    HEPBSAB Negative 03/24/2021     Lab Results   Component Value Date    HEPAIGG Negative 03/24/2021     Lab Results   Component Value Date    VARICELLAZOS 1.75 (H) 03/24/2021    VARICELLAINT Positive (A) 03/24/2021     Immunization History   Administered Date(s) Administered    Hepatitis A / Hepatitis B 06/18/2015    Hepatitis A, Adult 09/19/2014    Hepatitis B (recombinant) Adjuvanted, 2 dose 04/25/2022    Influenza 03/25/2013    Influenza - Quadrivalent 12/03/2015    Influenza - Trivalent - Afluria, Fluzone MDV 03/25/2013    Influenza Split 03/25/2013    Pneumococcal Conjugate - 13 Valent 09/05/2014    Tdap 06/18/2015    Zoster Recombinant 04/25/2022         Review of Systems  Review of Systems   Constitutional:  Positive for chills and fever. Negative for weight loss.   HENT:  Negative for sore throat.    Eyes:  Negative for pain, discharge and redness.   Respiratory:  Positive for cough. Negative for shortness of breath and wheezing.    Cardiovascular:  Negative for chest pain and leg swelling.   Gastrointestinal:  Negative for abdominal pain, blood in stool, constipation, diarrhea, heartburn, melena, nausea and vomiting.   Genitourinary:  Negative for dysuria and frequency.   Musculoskeletal:  Positive for back pain, joint pain and myalgias.   Skin:  Negative for rash.   Neurological:  Negative for focal weakness and seizures.   Endo/Heme/Allergies:  Does not bruise/bleed easily.   Psychiatric/Behavioral:  Positive for depression. The patient is not nervous/anxious.              Medical History:   Past Medical History:   Diagnosis Date    Allergy     seasonal    Crohn disease     Crohn's disease     DDD (degenerative disc disease), cervical     Joint pain     Keloid cicatrix     Ulcer        Surgical History:  Past Surgical History:   Procedure Laterality " Date    ABCESS DRAINAGE      APPENDECTOMY      COLON SURGERY  2017    Laparoscopic ileocolic resection    COLONOSCOPY N/A 12/5/2016    Procedure: COLONOSCOPY;  Surgeon: Steven Kerr MD;  Location: [facility] ENDO (4TH FLR);  Service: Endoscopy;  Laterality: N/A;    COLONOSCOPY N/A 7/30/2020    Procedure: COLONOSCOPY;  Surgeon: Steven Kerr MD;  Location: [facility] ENDO (4TH FLR);  Service: Endoscopy;  Laterality: N/A;  covid 7/27/20-[facility] floor-BB  instructions given to patient in person-BB.7/28 Called pt to come in the morning. Cannot come in early. EC    COLONOSCOPY N/A 2/19/2024    Procedure: COLONOSCOPY;  Surgeon: Terry Stephen MD;  Location: [facility] ENDO (4TH FLR);  Service: Endoscopy;  Laterality: N/A;  Ref By:,instr sent via [place],[facility].  2/7[facility] for precall-MS    DIGITAL RECTAL EXAMINATION UNDER ANESTHESIA N/A 3/3/2021    Procedure: EXAM UNDER ANESTHESIA, DIGITAL, RECTUM;  Surgeon: Pete Diaz MD;  Location: [facility] OR 2ND FLR;  Service: Colon and Rectal;  Laterality: N/A;    ESOPHAGOGASTRODUODENOSCOPY N/A 7/30/2020    Procedure: EGD (ESOPHAGOGASTRODUODENOSCOPY);  Surgeon: Steven Kerr MD;  Location: [facility] ENDO (4TH FLR);  Service: Endoscopy;  Laterality: N/A;  covid 7/27/20-[facility] floor-BB  instructions given to patient in person-BB    EXAMINATION UNDER ANESTHESIA N/A 7/17/2020    Procedure: EXAM UNDER ANESTHESIA;  Surgeon: Pete Diaz MD;  Location: [facility] OR 2ND FLR;  Service: Colon and Rectal;  Laterality: N/A;    INCISION AND DRAINAGE OF ABSCESS N/A 3/3/2021    Procedure: INCISION AND DRAINAGE, ABSCESS;  Surgeon: Pete Diaz MD;  Location: [facility] OR 2ND FLR;  Service: Colon and Rectal;  Laterality: N/A;    INSERTION OF SETON STITCH N/A 3/3/2021    Procedure: PLACEMENT, SETON STITCH;  Surgeon: Pete Diaz MD;  Location: [facility] OR 2ND FLR;  Service: Colon and Rectal;  Laterality: N/A;    LAPAROSCOPIC RESECTION OF SMALL INTESTINE      RECTAL FISTULOTOMY N/A 7/17/2020    Procedure:  FISTULOTOMY, RECTUM;  Surgeon: Pete Diaz MD;  Location: [facility] OR [address] [place] [address];  Service: Colon and Rectal;  Laterality: N/A;       Family History:   Family History   Problem Relation Name Age of Onset    Arthritis Father      Cancer Maternal Grandmother      Melanoma Neg Hx      Psoriasis Neg Hx      Lupus Neg Hx      Eczema Neg Hx      Acne Neg Hx      Colon cancer Neg Hx      Rectal cancer Neg Hx      Stomach cancer Neg Hx      Crohn's disease Neg Hx      Esophageal cancer Neg Hx      Irritable bowel syndrome Neg Hx      Ulcerative colitis Neg Hx      Celiac disease Neg Hx         Social History:   Social History     Tobacco Use    Smoking status: Never    Smokeless tobacco: Never   Substance Use Topics    Alcohol use: No    Drug use: No       Allergies: Reviewed    Home Medications:   Medication List with Changes/Refills   Current Medications    ADALIMUMAB-ADAZ (HYRIMOZ,CF, PEN) 40 MG/0.4 ML PNIJ    Inject 0.4 mLs (40 mg total) into the skin every 7 days.    CELECOXIB (CELEBREX) 200 MG CAPSULE    TAKE 1 CAPSULE(200 MG) BY MOUTH TWICE DAILY    ESCITALOPRAM OXALATE (LEXAPRO) 10 MG TABLET    Take 1 tablet (10 mg total) by mouth once daily.       Physical Exam:  Vital Signs:  /89 (BP Location: Right arm, Patient Position: Sitting)   Pulse 105   Temp 99 °F (37.2 °C)   Ht 6' (1.829 m)   Wt 80.9 kg (178 lb 5.6 oz)   SpO2 96%   BMI 24.19 kg/m²   Body mass index is 24.19 kg/m².    Physical Exam  Vitals and nursing note reviewed.   Constitutional:       General: He is not in acute distress.     Appearance: Normal appearance. He is well-developed. He is not ill-appearing or toxic-appearing.   Cardiovascular:      Rate and Rhythm: Normal rate and regular rhythm.      Heart sounds: Normal heart sounds.   Pulmonary:      Effort: Pulmonary effort is normal.      Breath sounds: Normal breath sounds.   Abdominal:      General: Bowel sounds are normal. There is no distension.      Palpations: Abdomen is soft.       Tenderness: There is no abdominal tenderness.   Musculoskeletal:      Right lower leg: No edema.      Left lower leg: No edema.   Skin:     General: Skin is warm and dry.      Coloration: Skin is not pale.      Findings: No erythema or rash.   Neurological:      General: No focal deficit present.      Mental Status: He is alert and oriented to person, place, and time.   Psychiatric:         Behavior: Behavior normal.         Thought Content: Thought content normal.         Judgment: Judgment normal.         Labs: reviewed and pertinent noted above    Assessment/Plan:  Escobar Toro is a 45 y.o. male with ileal and perianal Crohn's disease with perianal fistulizing disease with recent abscess. The following issues were addresssed:    1. Crohn's disease of both small and large intestine with fistula    2. Immunosuppression due to drug therapy    3. Depression, unspecified depression type    4. Viral illness      1. Crohn's disease:  Overall he seems to be doing fairly well periods symptoms have stabilized since stopping Lexapro.  Last calprotectin level in March was normal.  No recent changes.  No problems with the Humira injections.  Continue current medical therapy.  Update labs today.  Plan for follow up in 6 months.  Due for colonoscopy in February.    2. Perianal abscess:  Setons removed.  Continue to monitor.  No active issues.    3. Depression:  Responded well to Lexapro but Lexapro cause diarrhea issues.  Not currently on medical therapy.  He is interested in possibly meeting with a therapist when available.  Also recommend establishing with a primary care physician to help with management of the depression issues.    4. Immunosuppression:  Recommend high-dose flu shot this fall when available.    5. Viral illness: Definitely sounds like he has a viral illness.  Given the significant myalgias and arthralgias it does raise suspicion for possible influenza, especially influenza B. I ordered an influenza  test but I do not know if this will be able to be done lab.  Unfortunately we do not have the swabs to do this test in the office.  If they are not able to do it in the lab I encouraged him to see primary care or urgent care to get tested for influenza if not improving in the next 24 hours.  If he is feeling better by Thursday he can continue with the Humira shots but if not I asked him to contact us so we can give him guidance on when to restart the Humira.      # IBD specific health maintenance:  Colon cancer surveillance:  Up-to-date-minimal colonic involvement    Annual:  - Eye exam:  Discuss in the future  - Skin exam (if on IMM/TNF):  Recommend annual exam-referral placed today  - reminded pt to use sunblock/hats/sunprotective clothing  - PAP (if immunosuppressed):  Not applicable    DEXA:  Not applicable    Vitamin D:  Recent level was normal    Vaccines:    Influenza:  Recommend annual vaccination   Pneumovax:  Recommend vaccination   HAV:  Needs vaccination   HBV:  Needs vaccination   Tdap:  Due for updated vaccination this year   MMR:  Up-to-date   VZV:  Immune   HZV:  Recommend vaccination   HPV:  Not applicable   Meningococcus:  Not applicable   COVID:  Recommend vaccination    Follow up: Follow up in about 6 months (around 1/14/2026).    Visit today is associated with current or anticipated ongoing medical care related to this patient's single serious condition/complex condition (Crohn's disease).      Thank you again for sending Escobar Toro to see Dr. Gutierrez Stephen today at the [facility]. Please don't hesitate to contact Dr. Stephen if there are any questions regarding this evaluation, or if you have any other patients with inflammatory bowel disease for whom you would like a consultation. You can reach Dr. Stephen at [phone] or by email at [email or web address]    Terry Stephen MD  Department of Gastroenterology  Inflammatory Bowel  Disease

## 2025-07-14 NOTE — TELEPHONE ENCOUNTER
Copied from CRM #[id number]. Topic: Medications - Medication Refill  >> Jul 14, 2025 10:57 AM Valente wrote:  Requesting an RX refill or new RX.    Is this a refill or new RX: refill    RX name and strength (copy/paste from chart):  nirmatrelvir-ritonavir 300 mg (150 mg x 2)-100 mg copackaged tablets (EUA)      Is this a 30 day or 90 day RX: 30    Pharmacy name and phone # (copy/paste from chart):    [facility][id number] [address] AT [facility]  [address] [place] [id number]  Phone: [phone] Fax: [phone]    Who called and call back number:Tatiana Penn [phone]    The doctors have asked that we provide their patients with the following 2 reminders -- prescription refills can take up to 72 hours, and a friendly reminder that in the future you can use your [facility] account to request refills:

## 2025-07-15 LAB
MITOGEN MINUS NIL (OHS): 3.47
NIL TB SYNCED (OHS): 0.03
QUANTIFERON GOLD INTERP (OHS): NEGATIVE
TB1 AG MINUS NIL (OHS): 0.01
TB2 AG MINUS NIL (OHS): 0.01

## 2025-07-17 LAB
W HEPATITIS B SURFACE ANTIBODY, QUALITATIVE: NEGATIVE
W HEPATITIS B SURFACE ANTIBODY, QUANTITATIVE: <3 MIU/ML

## 2025-07-31 DIAGNOSIS — K50.813 CROHN'S DISEASE OF BOTH SMALL AND LARGE INTESTINE WITH FISTULA: ICD-10-CM

## 2025-07-31 RX ORDER — ADALIMUMAB-ADAZ 40 MG/.4ML
INJECTION, SOLUTION SUBCUTANEOUS
Qty: 4 PEN | Refills: 5 | Status: SHIPPED | OUTPATIENT
Start: 2025-07-31

## 2025-07-31 NOTE — TELEPHONE ENCOUNTER
"Primary provider: Dr. Terry Stephen    IBD medications:  Adalimumab       40 mg weekly     Refill request for:      Pended Medication(s)   Requested Prescriptions     Pending Prescriptions Disp Refills    HYRIMOZ,CF, PEN 40 mg/0.4 mL PnIj [Pharmacy Med Name: HYRIMOZ PEN 40MG/0.4ML]  0     Sig: INJECT 1 PEN UNDER THE SKIN EVERY 7 DAYS           Allergies reviewed: Yes    Drug Monitoring labs/frequency for all IBD meds:    CBC and CMP every 6 months  TB and Hep B every 12 months    Lab Results   Component Value Date    HEPBSAG Non-Reactive 07/14/2025    HEPBCAB Non-Reactive 07/14/2025     Lab Results   Component Value Date    TBGOLDPLUS Negative 07/22/2024     Lab Results   Component Value Date    QUANTNILVALU 0.60203 07/14/2025    QUANTIFERON Negative 12/17/2013    QUANTTBGDPL Negative 07/14/2025      No results found for: "TSPOTSCREN"  Lab Results   Component Value Date    DMPAIFZX02QT 31 07/22/2024    ZREBXJLP75 514 07/22/2024     Lab Results   Component Value Date    WBC 3.46 (L) 07/14/2025    HGB 13.4 (L) 07/14/2025    HCT 41.3 07/14/2025    MCV 91 07/14/2025     07/14/2025     Lab Results   Component Value Date    CREATININE 1.3 07/14/2025    ALBUMIN 4.1 07/14/2025    BILITOT 0.8 07/14/2025    ALKPHOS 77 07/14/2025    AST 27 07/14/2025    ALT 32 07/14/2025     Lab Results   Component Value Date    CHOL 186 06/17/2015    HDL 32 (L) 05/06/2014    LDLCALC 104.4 05/06/2014    TRIG 103 05/06/2014       Lab due date (mo/yr):  1/2025    Labs scheduled: No - but patient has an OV before or when labs are due     Next appt: 1/20/26 w/Dr. Quevedo    RX refill sent to provider for amount until next labs: Yes       "

## 2025-08-06 ENCOUNTER — TELEPHONE (OUTPATIENT)
Dept: GASTROENTEROLOGY | Facility: CLINIC | Age: 45
End: 2025-08-06
Payer: COMMERCIAL

## (undated) DEVICE — SYR ONLY LUER LOCK 20CC

## (undated) DEVICE — SEE MEDLINE ITEM 154981

## (undated) DEVICE — ELECTRODE REM PLYHSV RETURN 9

## (undated) DEVICE — LUBRICANT SURGILUBE 2 OZ

## (undated) DEVICE — BOVIE SUCTION

## (undated) DEVICE — BRIEF MESH LARGE

## (undated) DEVICE — SEE MEDLINE ITEM 146417

## (undated) DEVICE — GAUZE SPONGE 4X4 12PLY

## (undated) DEVICE — SEE MEDLINE ITEM 157148

## (undated) DEVICE — PANTIES FEMININE NAPKIN LG/XLG

## (undated) DEVICE — SEE MEDLINE ITEM 152622

## (undated) DEVICE — NDL 22GA X1 1/2 REG BEVEL

## (undated) DEVICE — TRAY MINOR GEN SURG

## (undated) DEVICE — SEE MEDLINE ITEM 157117

## (undated) DEVICE — TUBING ARGYLE PENROSE DRN 6MM

## (undated) DEVICE — TAPE SILK 3IN

## (undated) DEVICE — TAPE SURG DURAPORE 2 X10YD